# Patient Record
Sex: FEMALE | Race: WHITE | NOT HISPANIC OR LATINO | Employment: UNEMPLOYED | URBAN - METROPOLITAN AREA
[De-identification: names, ages, dates, MRNs, and addresses within clinical notes are randomized per-mention and may not be internally consistent; named-entity substitution may affect disease eponyms.]

---

## 2017-01-27 ENCOUNTER — APPOINTMENT (OUTPATIENT)
Dept: GENERAL RADIOLOGY | Facility: HOSPITAL | Age: 27
End: 2017-01-27

## 2017-01-27 ENCOUNTER — HOSPITAL ENCOUNTER (EMERGENCY)
Facility: HOSPITAL | Age: 27
Discharge: HOME | End: 2017-01-27
Attending: EMERGENCY MEDICINE | Admitting: EMERGENCY MEDICINE

## 2017-01-27 VITALS
HEART RATE: 94 BPM | RESPIRATION RATE: 16 BRPM | DIASTOLIC BLOOD PRESSURE: 58 MMHG | HEIGHT: 67 IN | WEIGHT: 150 LBS | BODY MASS INDEX: 23.54 KG/M2 | TEMPERATURE: 97.6 F | SYSTOLIC BLOOD PRESSURE: 99 MMHG | OXYGEN SATURATION: 96 %

## 2017-01-27 DIAGNOSIS — R19.7 DIARRHEA, UNSPECIFIED TYPE: ICD-10-CM

## 2017-01-27 DIAGNOSIS — R11.2 NON-INTRACTABLE VOMITING WITH NAUSEA, UNSPECIFIED VOMITING TYPE: ICD-10-CM

## 2017-01-27 DIAGNOSIS — J18.9 PNEUMONIA OF LEFT LOWER LOBE DUE TO INFECTIOUS ORGANISM: Primary | ICD-10-CM

## 2017-01-27 LAB
ALBUMIN SERPL-MCNC: 3.7 G/DL (ref 3.5–5.2)
ALBUMIN/GLOB SERPL: 1.1 G/DL
ALP SERPL-CCNC: 72 U/L (ref 39–117)
ALT SERPL W P-5'-P-CCNC: 12 U/L (ref 1–33)
ANION GAP SERPL CALCULATED.3IONS-SCNC: 13.5 MMOL/L
AST SERPL-CCNC: 11 U/L (ref 1–32)
B-HCG UR QL: NEGATIVE
BACTERIA UR QL AUTO: ABNORMAL /HPF
BASOPHILS # BLD AUTO: 0.01 10*3/MM3 (ref 0–0.2)
BASOPHILS NFR BLD AUTO: 0.1 % (ref 0–1.5)
BILIRUB SERPL-MCNC: 0.3 MG/DL (ref 0.1–1.2)
BILIRUB UR QL STRIP: NEGATIVE
BUN BLD-MCNC: 7 MG/DL (ref 6–20)
BUN/CREAT SERPL: 13 (ref 7–25)
CALCIUM SPEC-SCNC: 9.3 MG/DL (ref 8.6–10.5)
CHLORIDE SERPL-SCNC: 98 MMOL/L (ref 98–107)
CLARITY UR: ABNORMAL
CO2 SERPL-SCNC: 25.5 MMOL/L (ref 22–29)
COLOR UR: ABNORMAL
CREAT BLD-MCNC: 0.54 MG/DL (ref 0.57–1)
DEPRECATED RDW RBC AUTO: 41.4 FL (ref 37–54)
EOSINOPHIL # BLD AUTO: 0.04 10*3/MM3 (ref 0–0.7)
EOSINOPHIL NFR BLD AUTO: 0.5 % (ref 0.3–6.2)
ERYTHROCYTE [DISTWIDTH] IN BLOOD BY AUTOMATED COUNT: 12.7 % (ref 11.7–13)
FLUAV AG NPH QL: NEGATIVE
FLUBV AG NPH QL IA: NEGATIVE
GFR SERPL CREATININE-BSD FRML MDRD: 136 ML/MIN/1.73
GLOBULIN UR ELPH-MCNC: 3.4 GM/DL
GLUCOSE BLD-MCNC: 104 MG/DL (ref 65–99)
GLUCOSE UR STRIP-MCNC: NEGATIVE MG/DL
HCT VFR BLD AUTO: 38.6 % (ref 35.6–45.5)
HGB BLD-MCNC: 13 G/DL (ref 11.9–15.5)
HGB UR QL STRIP.AUTO: ABNORMAL
HOLD SPECIMEN: NORMAL
HOLD SPECIMEN: NORMAL
HYALINE CASTS UR QL AUTO: ABNORMAL /LPF
IMM GRANULOCYTES # BLD: 0.02 10*3/MM3 (ref 0–0.03)
IMM GRANULOCYTES NFR BLD: 0.2 % (ref 0–0.5)
KETONES UR QL STRIP: ABNORMAL
LEUKOCYTE ESTERASE UR QL STRIP.AUTO: ABNORMAL
LIPASE SERPL-CCNC: 14 U/L (ref 13–60)
LYMPHOCYTES # BLD AUTO: 2.33 10*3/MM3 (ref 0.9–4.8)
LYMPHOCYTES NFR BLD AUTO: 29.1 % (ref 19.6–45.3)
MCH RBC QN AUTO: 30 PG (ref 26.9–32)
MCHC RBC AUTO-ENTMCNC: 33.7 G/DL (ref 32.4–36.3)
MCV RBC AUTO: 88.9 FL (ref 80.5–98.2)
MONOCYTES # BLD AUTO: 0.76 10*3/MM3 (ref 0.2–1.2)
MONOCYTES NFR BLD AUTO: 9.5 % (ref 5–12)
NEUTROPHILS # BLD AUTO: 4.85 10*3/MM3 (ref 1.9–8.1)
NEUTROPHILS NFR BLD AUTO: 60.6 % (ref 42.7–76)
NITRITE UR QL STRIP: NEGATIVE
PH UR STRIP.AUTO: 6 [PH] (ref 5–8)
PLATELET # BLD AUTO: 241 10*3/MM3 (ref 140–500)
PMV BLD AUTO: 10.2 FL (ref 6–12)
POTASSIUM BLD-SCNC: 4.2 MMOL/L (ref 3.5–5.2)
PROT SERPL-MCNC: 7.1 G/DL (ref 6–8.5)
PROT UR QL STRIP: ABNORMAL
RBC # BLD AUTO: 4.34 10*6/MM3 (ref 3.9–5.2)
RBC # UR: ABNORMAL /HPF
REF LAB TEST METHOD: ABNORMAL
SODIUM BLD-SCNC: 137 MMOL/L (ref 136–145)
SP GR UR STRIP: 1.02 (ref 1–1.03)
SQUAMOUS #/AREA URNS HPF: ABNORMAL /HPF
UROBILINOGEN UR QL STRIP: ABNORMAL
WBC NRBC COR # BLD: 8.01 10*3/MM3 (ref 4.5–10.7)
WBC UR QL AUTO: ABNORMAL /HPF
WHOLE BLOOD HOLD SPECIMEN: NORMAL
WHOLE BLOOD HOLD SPECIMEN: NORMAL

## 2017-01-27 PROCEDURE — 87086 URINE CULTURE/COLONY COUNT: CPT | Performed by: EMERGENCY MEDICINE

## 2017-01-27 PROCEDURE — 99283 EMERGENCY DEPT VISIT LOW MDM: CPT

## 2017-01-27 PROCEDURE — 85025 COMPLETE CBC W/AUTO DIFF WBC: CPT | Performed by: EMERGENCY MEDICINE

## 2017-01-27 PROCEDURE — 25010000003 CEFTRIAXONE PER 250 MG: Performed by: EMERGENCY MEDICINE

## 2017-01-27 PROCEDURE — 96365 THER/PROPH/DIAG IV INF INIT: CPT

## 2017-01-27 PROCEDURE — 83690 ASSAY OF LIPASE: CPT | Performed by: EMERGENCY MEDICINE

## 2017-01-27 PROCEDURE — 80053 COMPREHEN METABOLIC PANEL: CPT | Performed by: EMERGENCY MEDICINE

## 2017-01-27 PROCEDURE — 96361 HYDRATE IV INFUSION ADD-ON: CPT

## 2017-01-27 PROCEDURE — 25010000002 ONDANSETRON PER 1 MG: Performed by: EMERGENCY MEDICINE

## 2017-01-27 PROCEDURE — 81025 URINE PREGNANCY TEST: CPT | Performed by: EMERGENCY MEDICINE

## 2017-01-27 PROCEDURE — 87804 INFLUENZA ASSAY W/OPTIC: CPT | Performed by: EMERGENCY MEDICINE

## 2017-01-27 PROCEDURE — 96375 TX/PRO/DX INJ NEW DRUG ADDON: CPT

## 2017-01-27 PROCEDURE — 71020 HC CHEST PA AND LATERAL: CPT

## 2017-01-27 PROCEDURE — 81001 URINALYSIS AUTO W/SCOPE: CPT | Performed by: EMERGENCY MEDICINE

## 2017-01-27 RX ORDER — SODIUM CHLORIDE 0.9 % (FLUSH) 0.9 %
10 SYRINGE (ML) INJECTION AS NEEDED
Status: DISCONTINUED | OUTPATIENT
Start: 2017-01-27 | End: 2017-01-27 | Stop reason: HOSPADM

## 2017-01-27 RX ORDER — CEFTRIAXONE SODIUM 1 G/50ML
1 INJECTION, SOLUTION INTRAVENOUS ONCE
Status: COMPLETED | OUTPATIENT
Start: 2017-01-27 | End: 2017-01-27

## 2017-01-27 RX ORDER — ONDANSETRON 2 MG/ML
4 INJECTION INTRAMUSCULAR; INTRAVENOUS ONCE
Status: COMPLETED | OUTPATIENT
Start: 2017-01-27 | End: 2017-01-27

## 2017-01-27 RX ORDER — DOXYCYCLINE 100 MG/1
100 CAPSULE ORAL 2 TIMES DAILY
Qty: 14 CAPSULE | Refills: 0 | OUTPATIENT
Start: 2017-01-27 | End: 2020-11-01

## 2017-01-27 RX ORDER — ONDANSETRON 4 MG/1
4 TABLET, ORALLY DISINTEGRATING ORAL EVERY 8 HOURS PRN
Qty: 15 TABLET | Refills: 0 | OUTPATIENT
Start: 2017-01-27 | End: 2020-11-01

## 2017-01-27 RX ADMIN — ONDANSETRON 4 MG: 2 INJECTION INTRAMUSCULAR; INTRAVENOUS at 05:45

## 2017-01-27 RX ADMIN — CEFTRIAXONE SODIUM 1 G: 1 INJECTION, SOLUTION INTRAVENOUS at 07:43

## 2017-01-27 RX ADMIN — SODIUM CHLORIDE 1000 ML: 9 INJECTION, SOLUTION INTRAVENOUS at 05:45

## 2017-01-28 LAB — BACTERIA SPEC AEROBE CULT: NORMAL

## 2020-11-01 ENCOUNTER — APPOINTMENT (OUTPATIENT)
Dept: CT IMAGING | Facility: HOSPITAL | Age: 30
End: 2020-11-01

## 2020-11-01 ENCOUNTER — HOSPITAL ENCOUNTER (EMERGENCY)
Facility: HOSPITAL | Age: 30
Discharge: HOME | End: 2020-11-01
Admitting: EMERGENCY MEDICINE

## 2020-11-01 VITALS
SYSTOLIC BLOOD PRESSURE: 121 MMHG | WEIGHT: 241.62 LBS | DIASTOLIC BLOOD PRESSURE: 72 MMHG | RESPIRATION RATE: 18 BRPM | HEIGHT: 68 IN | TEMPERATURE: 98.2 F | OXYGEN SATURATION: 99 % | HEART RATE: 70 BPM | BODY MASS INDEX: 36.62 KG/M2

## 2020-11-01 DIAGNOSIS — R10.9 FLANK PAIN: Primary | ICD-10-CM

## 2020-11-01 DIAGNOSIS — R11.0 NAUSEA: ICD-10-CM

## 2020-11-01 LAB
ANION GAP SERPL CALCULATED.3IONS-SCNC: 6 MMOL/L (ref 5–15)
B-HCG UR QL: NEGATIVE
BASOPHILS # BLD AUTO: 0 10*3/MM3 (ref 0–0.2)
BASOPHILS NFR BLD AUTO: 0.3 % (ref 0–1.5)
BILIRUB UR QL STRIP: NEGATIVE
BUN SERPL-MCNC: 10 MG/DL (ref 6–20)
BUN/CREAT SERPL: 18.2 (ref 7–25)
CALCIUM SPEC-SCNC: 9.2 MG/DL (ref 8.6–10.5)
CHLORIDE SERPL-SCNC: 102 MMOL/L (ref 98–107)
CLARITY UR: ABNORMAL
CO2 SERPL-SCNC: 29 MMOL/L (ref 22–29)
COLOR UR: YELLOW
CREAT SERPL-MCNC: 0.55 MG/DL (ref 0.57–1)
DEPRECATED RDW RBC AUTO: 37.6 FL (ref 37–54)
EOSINOPHIL # BLD AUTO: 0.1 10*3/MM3 (ref 0–0.4)
EOSINOPHIL NFR BLD AUTO: 1 % (ref 0.3–6.2)
ERYTHROCYTE [DISTWIDTH] IN BLOOD BY AUTOMATED COUNT: 12.5 % (ref 12.3–15.4)
GFR SERPL CREATININE-BSD FRML MDRD: 130 ML/MIN/1.73
GLUCOSE SERPL-MCNC: 90 MG/DL (ref 65–99)
GLUCOSE UR STRIP-MCNC: NEGATIVE MG/DL
HCT VFR BLD AUTO: 39.4 % (ref 34–46.6)
HGB BLD-MCNC: 13.6 G/DL (ref 12–15.9)
HGB UR QL STRIP.AUTO: NEGATIVE
KETONES UR QL STRIP: NEGATIVE
LEUKOCYTE ESTERASE UR QL STRIP.AUTO: NEGATIVE
LYMPHOCYTES # BLD AUTO: 2.1 10*3/MM3 (ref 0.7–3.1)
LYMPHOCYTES NFR BLD AUTO: 37.7 % (ref 19.6–45.3)
MCH RBC QN AUTO: 29.6 PG (ref 26.6–33)
MCHC RBC AUTO-ENTMCNC: 34.4 G/DL (ref 31.5–35.7)
MCV RBC AUTO: 86.1 FL (ref 79–97)
MONOCYTES # BLD AUTO: 0.5 10*3/MM3 (ref 0.1–0.9)
MONOCYTES NFR BLD AUTO: 9.7 % (ref 5–12)
NEUTROPHILS NFR BLD AUTO: 2.8 10*3/MM3 (ref 1.7–7)
NEUTROPHILS NFR BLD AUTO: 51.3 % (ref 42.7–76)
NITRITE UR QL STRIP: NEGATIVE
NRBC BLD AUTO-RTO: 0 /100 WBC (ref 0–0.2)
PH UR STRIP.AUTO: 6.5 [PH] (ref 5–8)
PLATELET # BLD AUTO: 206 10*3/MM3 (ref 140–450)
PMV BLD AUTO: 8.4 FL (ref 6–12)
POTASSIUM SERPL-SCNC: 4.2 MMOL/L (ref 3.5–5.2)
PROT UR QL STRIP: NEGATIVE
RBC # BLD AUTO: 4.58 10*6/MM3 (ref 3.77–5.28)
SODIUM SERPL-SCNC: 137 MMOL/L (ref 136–145)
SP GR UR STRIP: 1.03 (ref 1–1.03)
UROBILINOGEN UR QL STRIP: ABNORMAL
WBC # BLD AUTO: 5.5 10*3/MM3 (ref 3.4–10.8)

## 2020-11-01 PROCEDURE — 96375 TX/PRO/DX INJ NEW DRUG ADDON: CPT

## 2020-11-01 PROCEDURE — 96374 THER/PROPH/DIAG INJ IV PUSH: CPT

## 2020-11-01 PROCEDURE — 25010000002 ONDANSETRON PER 1 MG: Performed by: NURSE PRACTITIONER

## 2020-11-01 PROCEDURE — 25010000002 KETOROLAC TROMETHAMINE PER 15 MG: Performed by: NURSE PRACTITIONER

## 2020-11-01 PROCEDURE — 81003 URINALYSIS AUTO W/O SCOPE: CPT | Performed by: NURSE PRACTITIONER

## 2020-11-01 PROCEDURE — 85025 COMPLETE CBC W/AUTO DIFF WBC: CPT | Performed by: NURSE PRACTITIONER

## 2020-11-01 PROCEDURE — 81025 URINE PREGNANCY TEST: CPT | Performed by: NURSE PRACTITIONER

## 2020-11-01 PROCEDURE — 99283 EMERGENCY DEPT VISIT LOW MDM: CPT

## 2020-11-01 PROCEDURE — 74176 CT ABD & PELVIS W/O CONTRAST: CPT

## 2020-11-01 PROCEDURE — 80048 BASIC METABOLIC PNL TOTAL CA: CPT | Performed by: NURSE PRACTITIONER

## 2020-11-01 RX ORDER — ONDANSETRON 2 MG/ML
4 INJECTION INTRAMUSCULAR; INTRAVENOUS ONCE
Status: COMPLETED | OUTPATIENT
Start: 2020-11-01 | End: 2020-11-01

## 2020-11-01 RX ORDER — KETOROLAC TROMETHAMINE 15 MG/ML
15 INJECTION, SOLUTION INTRAMUSCULAR; INTRAVENOUS ONCE
Status: COMPLETED | OUTPATIENT
Start: 2020-11-01 | End: 2020-11-01

## 2020-11-01 RX ORDER — IBUPROFEN 800 MG/1
800 TABLET ORAL EVERY 8 HOURS PRN
Qty: 9 TABLET | Refills: 0 | Status: SHIPPED | OUTPATIENT
Start: 2020-11-01 | End: 2022-03-19

## 2020-11-01 RX ORDER — ONDANSETRON 4 MG/1
4 TABLET, ORALLY DISINTEGRATING ORAL EVERY 8 HOURS PRN
Qty: 20 TABLET | Refills: 0 | Status: SHIPPED | OUTPATIENT
Start: 2020-11-01 | End: 2022-03-19

## 2020-11-01 RX ADMIN — ONDANSETRON 4 MG: 2 INJECTION INTRAMUSCULAR; INTRAVENOUS at 16:34

## 2020-11-01 RX ADMIN — KETOROLAC TROMETHAMINE 15 MG: 15 INJECTION, SOLUTION INTRAMUSCULAR; INTRAVENOUS at 16:33

## 2020-11-01 NOTE — ED NOTES
Pt c/o right flank pain worsening x1 wk; states has known kidney stone.     Savita Gunn, RN  11/01/20 4567

## 2020-11-01 NOTE — ED PROVIDER NOTES
Subjective   Chief complaint: Right sided flank pain      Context: Patient is a 30-year-old female who comes in complaining of right-sided flank pain for the last week.  She states this feels similar to last time she had a kidney stone.  Has had a prior history of ureteral stent several years ago.  She denies any urinary complaints fever vomiting diarrhea melena hematochezia.  Has had some nausea.  She denies any unusual vaginal bleeding or discharge.  She is not previously been seen for these complaints this week.    Duration: 1 week    Timing: Waxes and wanes    Severity: Moderate    Associated symptoms: Worse with range of motion          PCP: None      LNMP: 2 days ago            Review of Systems    Past Medical History:   Diagnosis Date   • Asthma    • Kidney stone        Allergies   Allergen Reactions   • Penicillins        Past Surgical History:   Procedure Laterality Date   •  SECTION     • CHOLECYSTECTOMY     • DILATION AND CURETTAGE, DIAGNOSTIC / THERAPEUTIC     • URETERAL STENT INSERTION         No family history on file.    Social History     Socioeconomic History   • Marital status: Single     Spouse name: Not on file   • Number of children: Not on file   • Years of education: Not on file   • Highest education level: Not on file   Tobacco Use   • Smoking status: Current Every Day Smoker     Packs/day: 0.50   Substance and Sexual Activity   • Alcohol use: No           Objective   Physical Exam     Vital signs and triage nurse note reviewed.   Constitutional: Awake, alert; well-developed and well-nourished.  Nontoxic in appearance.  Uncomfortable  HEENT: Normocephalic, atraumatic; pupils are PERRL with intact EOM; oropharynx is pink and moist without exudate or erythema.   Neck: Supple, full range of motion without pain;    Cardiovascular: Regular rate and rhythm, normal S1-S2.   Pulmonary: Respiratory effort regular nonlabored, breath sounds clear to auscultation all fields.   Abdomen: Soft,  right CVAT nondistended with normoactive bowel sounds; no rebound or guarding. Negative Brothers McBurney.  No peritoneal rigidity or guarding.  There is no suprapubic tenderness  Musculoskeletal: Independent range of motion of all extremities with no palpable tenderness or edema.   Neuro: Alert oriented x3, speech is clear and appropriate, GCS 15   Skin:  Fleshtone warm, dry, intact; no erythematous or petechial rash or lesion       Procedures           ED Course  ED Course as of Nov 01 1712   Sun Nov 01, 2020   1336 Nursing staff reports difficulty in obtaining IV access/labs    [JW]      ED Course User Index  [JW] Shakila Spears APRN           Labs Reviewed   BASIC METABOLIC PANEL - Abnormal; Notable for the following components:       Result Value    Creatinine 0.55 (*)     All other components within normal limits    Narrative:     GFR Normal >60  Chronic Kidney Disease <60  Kidney Failure <15     URINALYSIS W/ CULTURE IF INDICATED - Abnormal; Notable for the following components:    Appearance, UA Cloudy (*)     All other components within normal limits    Narrative:     Urine microscopic not indicated.   CBC WITH AUTO DIFFERENTIAL - Normal   PREGNANCY, URINE - Normal   CBC AND DIFFERENTIAL    Narrative:     The following orders were created for panel order CBC & Differential.  Procedure                               Abnormality         Status                     ---------                               -----------         ------                     CBC Auto Differential[[id number]]        Normal              Final result                 Please view results for these tests on the individual orders.     Medications   ondansetron (ZOFRAN) injection 4 mg (4 mg Intravenous Given 11/1/20 1634)   ketorolac (TORADOL) injection 15 mg (15 mg Intravenous Given 11/1/20 1633)     Ct Abdomen Pelvis Stone Protocol    Result Date: 11/1/2020  Evidence of cholecystectomy, otherwise normal noncontrast CT of the abdomen and  pelvis. No renal or ureteral stones or obstructing uropathy.  Electronically Signed By-Addy Larry DO. On:11/1/2020 5:02 PM This report was finalized on [id number] by  Addy Larry DO..                                    MDM  Number of Diagnoses or Management Options  Flank pain:   Nausea:   Diagnosis management comments:       Comorbidities:  has a past medical history of Asthma and Kidney stone.  Differentials: Kidney stone musculoskeletal pyelonephritis UTI; torsion felt unlikely based on HPI and physical exam not all inclusive of differentials considered  Radiology interpretation:  X-rays reviewed by me and interpreted by radiologist,   Ct Abdomen Pelvis Stone Protocol    Result Date: 11/1/2020  Evidence of cholecystectomy, otherwise normal noncontrast CT of the abdomen and pelvis. No renal or ureteral stones or obstructing uropathy.  Electronically Signed By-Addy Larry DO. On:11/1/2020 5:02 PM This report was finalized on [id number] by  Addy Larry DO..    Lab interpretation:  Labs viewed by me significant for, negative    Appropriate PPE worn during exam.  Given dose of Toradol and Zofran while in the ER.    i discussed findings with patient who voices understanding of discharge instructions, signs and symptoms requiring return to ED; discharged improved and in stable condition with follow up for re-evaluation.  Patient be discharged home with ibuprofen and Zofran      Final diagnoses:   Flank pain   Nausea            Shakila Spears, APRN  11/01/20 1713

## 2020-11-01 NOTE — DISCHARGE INSTRUCTIONS
Clear liquids for the next 24 hours.  Gradually increase your diet as tolerated.  No milk products for 48 hours.  If more than 8-10 episodes of diarrhea per day use over-the-counter Imodium.  Follow up with your family doctor next week.  Return for any new or worsening symptoms

## 2020-11-01 NOTE — ED NOTES
Patient marked ready for CT per Shakila ANDREA's request     Emilie Herrera, RegSched Rep  11/01/20 1524

## 2022-03-19 ENCOUNTER — HOSPITAL ENCOUNTER (EMERGENCY)
Facility: HOSPITAL | Age: 32
Discharge: HOME | End: 2022-03-19
Attending: EMERGENCY MEDICINE | Admitting: EMERGENCY MEDICINE

## 2022-03-19 VITALS
RESPIRATION RATE: 16 BRPM | HEIGHT: 67 IN | DIASTOLIC BLOOD PRESSURE: 74 MMHG | BODY MASS INDEX: 39.17 KG/M2 | HEART RATE: 69 BPM | WEIGHT: 249.56 LBS | TEMPERATURE: 97.8 F | OXYGEN SATURATION: 98 % | SYSTOLIC BLOOD PRESSURE: 135 MMHG

## 2022-03-19 DIAGNOSIS — K05.10 GINGIVITIS, CHRONIC: ICD-10-CM

## 2022-03-19 DIAGNOSIS — K02.9 DENTAL CARIES: Primary | ICD-10-CM

## 2022-03-19 DIAGNOSIS — R68.84 PAIN IN LOWER JAW: ICD-10-CM

## 2022-03-19 PROCEDURE — 99283 EMERGENCY DEPT VISIT LOW MDM: CPT

## 2022-03-19 PROCEDURE — 63710000001 ONDANSETRON ODT 4 MG TABLET DISPERSIBLE: Performed by: NURSE PRACTITIONER

## 2022-03-19 RX ORDER — ONDANSETRON 4 MG/1
4 TABLET, ORALLY DISINTEGRATING ORAL ONCE
Status: COMPLETED | OUTPATIENT
Start: 2022-03-19 | End: 2022-03-19

## 2022-03-19 RX ORDER — CLINDAMYCIN HYDROCHLORIDE 300 MG/1
300 CAPSULE ORAL ONCE
Status: COMPLETED | OUTPATIENT
Start: 2022-03-19 | End: 2022-03-19

## 2022-03-19 RX ORDER — DICLOFENAC SODIUM 75 MG/1
75 TABLET, DELAYED RELEASE ORAL 2 TIMES DAILY PRN
Qty: 20 TABLET | Refills: 0 | OUTPATIENT
Start: 2022-03-19 | End: 2022-09-01

## 2022-03-19 RX ORDER — CLINDAMYCIN HYDROCHLORIDE 300 MG/1
300 CAPSULE ORAL 3 TIMES DAILY
Qty: 30 CAPSULE | Refills: 0 | Status: SHIPPED | OUTPATIENT
Start: 2022-03-19 | End: 2022-03-29

## 2022-03-19 RX ORDER — HYDROCODONE BITARTRATE AND ACETAMINOPHEN 5; 325 MG/1; MG/1
1 TABLET ORAL ONCE
Status: COMPLETED | OUTPATIENT
Start: 2022-03-19 | End: 2022-03-19

## 2022-03-19 RX ORDER — ONDANSETRON 4 MG/1
4 TABLET, ORALLY DISINTEGRATING ORAL 4 TIMES DAILY PRN
Qty: 10 TABLET | Refills: 0 | OUTPATIENT
Start: 2022-03-19 | End: 2022-09-01

## 2022-03-19 RX ADMIN — HYDROCODONE BITARTRATE AND ACETAMINOPHEN 1 TABLET: 5; 325 TABLET ORAL at 19:28

## 2022-03-19 RX ADMIN — CLINDAMYCIN HYDROCHLORIDE 300 MG: 300 CAPSULE ORAL at 19:37

## 2022-03-19 RX ADMIN — LIDOCAINE HYDROCHLORIDE: 20 SOLUTION ORAL; TOPICAL at 19:27

## 2022-03-19 RX ADMIN — ONDANSETRON 4 MG: 4 TABLET, ORALLY DISINTEGRATING ORAL at 19:56

## 2022-03-19 NOTE — DISCHARGE INSTRUCTIONS
Take antibiotics till gone.    Use Voltaren for pain do not mix with Motrin ibuprofen Advil or Aleve.    See a dentist as soon as possible    Return if worse

## 2022-03-19 NOTE — ED PROVIDER NOTES
Subjective   Patient is a morbidly obese 31-year-old female with poor dental hygiene who comes in with lower jaw pain swelling for the last 2 days.  She states that she has not seen a dentist in many years she knows that she has bad cavities and gingivitis that is out of control she thinks that she needs her last 4 bottom teeth removed.  She rates her pain an 8/10 she states it is severe it radiates into her chin she denies any difficulty breathing or swallowing.          Review of Systems   Constitutional: Negative for chills, fatigue and fever.   HENT: Positive for dental problem and facial swelling. Negative for congestion, drooling, rhinorrhea, tinnitus and trouble swallowing.    Eyes: Negative for photophobia, discharge and redness.   Respiratory: Negative for cough and shortness of breath.    Cardiovascular: Negative for chest pain and palpitations.   Gastrointestinal: Negative for abdominal pain, diarrhea, nausea and vomiting.   Genitourinary: Negative for dysuria, frequency and urgency.   Musculoskeletal: Negative for back pain, joint swelling and myalgias.   Skin: Negative for rash.   Neurological: Negative for dizziness and headaches.   Psychiatric/Behavioral: Negative for confusion.   All other systems reviewed and are negative.      Past Medical History:   Diagnosis Date   • Asthma    • Kidney stone        Allergies   Allergen Reactions   • Penicillins        Past Surgical History:   Procedure Laterality Date   •  SECTION     • CHOLECYSTECTOMY     • DILATION AND CURETTAGE, DIAGNOSTIC / THERAPEUTIC     • URETERAL STENT INSERTION         No family history on file.    Social History     Socioeconomic History   • Marital status: Single   Tobacco Use   • Smoking status: Current Every Day Smoker     Packs/day: 0.50   Substance and Sexual Activity   • Alcohol use: No           Objective   Physical Exam  Vitals reviewed.   Constitutional:       Appearance: Normal appearance. She is well-developed.  "  HENT:      Head: Normocephalic and atraumatic.      Right Ear: External ear normal.      Left Ear: External ear normal.      Mouth/Throat:      Lips: Pink.      Mouth: Mucous membranes are moist. No angioedema.      Dentition: Abnormal dentition. Dental tenderness, gingival swelling, dental caries, dental abscesses and gum lesions present.      Palate: No mass and lesions.      Pharynx: Oropharynx is clear. Uvula midline.      Tonsils: No tonsillar exudate or tonsillar abscesses.   Eyes:      Conjunctiva/sclera: Conjunctivae normal.      Pupils: Pupils are equal, round, and reactive to light.   Cardiovascular:      Rate and Rhythm: Normal rate and regular rhythm.      Heart sounds: Normal heart sounds.   Musculoskeletal:         General: No deformity. Normal range of motion.      Cervical back: Normal range of motion and neck supple.   Skin:     General: Skin is warm and dry.      Capillary Refill: Capillary refill takes less than 2 seconds.   Neurological:      Mental Status: She is alert and oriented to person, place, and time.      GCS: GCS eye subscore is 4. GCS verbal subscore is 5. GCS motor subscore is 6.      Cranial Nerves: No cranial nerve deficit.      Sensory: No sensory deficit.      Deep Tendon Reflexes: Reflexes normal.   Psychiatric:         Mood and Affect: Mood normal.         Behavior: Behavior normal.         Procedures           ED Course      /74   Pulse 69   Temp 97.8 °F (36.6 °C) (Oral)   Resp 16   Ht 170.2 cm (67\")   Wt 113 kg (249 lb 9 oz)   LMP 03/02/2022   SpO2 98%   BMI 39.09 kg/m²   Labs Reviewed - No data to display  Medications   clindamycin (CLEOCIN) capsule 300 mg (300 mg Oral Given 3/19/22 1937)   dental ball oral suspension with diphenhydrAMINE ( Swish & Spit Given 3/19/22 1927)   HYDROcodone-acetaminophen (NORCO) 5-325 MG per tablet 1 tablet (1 tablet Oral Given 3/19/22 1928)   ondansetron ODT (ZOFRAN-ODT) disintegrating tablet 4 mg (4 mg Oral Given 3/19/22 1956) "     No radiology results for the last day                                             MDM  Number of Diagnoses or Management Options  Dental caries  Gingivitis, chronic  Pain in lower jaw  Diagnosis management comments: Above exam was performed and patient was found to have severe gingivitis and dental caries dental abscess in the bottom 4 front teeth.  The patient will be started on clindamycin she will be given a pain pill here she be sent home with some Voltaren some Zofran and some clindamycin she was given the dental information list as well as a good Rx coupon to help with her prescriptions she was advised to see a dentist as soon as possible and to return if worse she verbalized understood discharge instruction.    Risk of Complications, Morbidity, and/or Mortality  Presenting problems: minimal  Diagnostic procedures: minimal  Management options: minimal    Patient Progress  Patient progress: improved      Final diagnoses:   Dental caries   Pain in lower jaw   Gingivitis, chronic       ED Disposition  ED Disposition     ED Disposition   Discharge    Condition   Stable    Comment   --               See a dentist as soon as possible             Medication List      New Prescriptions    clindamycin 300 MG capsule  Commonly known as: CLEOCIN  Take 1 capsule by mouth 3 (Three) Times a Day for 10 days.     diclofenac 75 MG EC tablet  Commonly known as: VOLTAREN  Take 1 tablet by mouth 2 (Two) Times a Day As Needed (pain).     ondansetron ODT 4 MG disintegrating tablet  Commonly known as: ZOFRAN-ODT  Place 1 tablet under the tongue 4 (Four) Times a Day As Needed for Nausea or Vomiting.           Where to Get Your Medications      These medications were sent to [facility], [place] - [address] AT [facility] - [phone]  - [phone] FX  [address] [place] [address]    Phone: [phone]   · clindamycin 300 MG capsule  · diclofenac 75 MG EC tablet  · ondansetron ODT 4  MG disintegrating tablet          Janee Spears, APRN  03/19/22 1959

## 2022-03-19 NOTE — ED NOTES
Patient states she has severe heartburn and is exteremly nauseous and spitting up. Informed provider and new orders were put in.  Patient states she didn't throw up the pills.

## 2022-08-12 ENCOUNTER — HOSPITAL ENCOUNTER (EMERGENCY)
Facility: HOSPITAL | Age: 32
End: 2022-08-12

## 2022-08-13 ENCOUNTER — HOSPITAL ENCOUNTER (EMERGENCY)
Facility: HOSPITAL | Age: 32
Discharge: LEFT BEFORE DISCHARGE | End: 2022-08-13
Attending: EMERGENCY MEDICINE

## 2022-08-13 VITALS
BODY MASS INDEX: 47.63 KG/M2 | HEIGHT: 62 IN | SYSTOLIC BLOOD PRESSURE: 117 MMHG | HEART RATE: 75 BPM | TEMPERATURE: 97.3 F | OXYGEN SATURATION: 98 % | DIASTOLIC BLOOD PRESSURE: 58 MMHG | WEIGHT: 258.82 LBS | RESPIRATION RATE: 16 BRPM

## 2022-08-13 LAB
BACTERIA UR QL AUTO: ABNORMAL /HPF
BILIRUB UR QL STRIP: NEGATIVE
CLARITY UR: CLEAR
COLOR UR: ABNORMAL
GLUCOSE UR STRIP-MCNC: NEGATIVE MG/DL
HGB UR QL STRIP.AUTO: ABNORMAL
HYALINE CASTS UR QL AUTO: ABNORMAL /LPF
KETONES UR QL STRIP: ABNORMAL
LEUKOCYTE ESTERASE UR QL STRIP.AUTO: ABNORMAL
NITRITE UR QL STRIP: NEGATIVE
PH UR STRIP.AUTO: 5.5 [PH] (ref 5–8)
PROT UR QL STRIP: NEGATIVE
RBC # UR STRIP: ABNORMAL /HPF
REF LAB TEST METHOD: ABNORMAL
SP GR UR STRIP: 1.04 (ref 1–1.03)
SQUAMOUS #/AREA URNS HPF: ABNORMAL /HPF
UROBILINOGEN UR QL STRIP: ABNORMAL
WBC # UR STRIP: ABNORMAL /HPF

## 2022-08-13 PROCEDURE — 81001 URINALYSIS AUTO W/SCOPE: CPT

## 2022-08-13 PROCEDURE — 99211 OFF/OP EST MAY X REQ PHY/QHP: CPT | Performed by: EMERGENCY MEDICINE

## 2022-08-14 ENCOUNTER — APPOINTMENT (OUTPATIENT)
Dept: ULTRASOUND IMAGING | Facility: HOSPITAL | Age: 32
End: 2022-08-14

## 2022-08-14 ENCOUNTER — HOSPITAL ENCOUNTER (EMERGENCY)
Facility: HOSPITAL | Age: 32
Discharge: HOME | End: 2022-08-14
Attending: EMERGENCY MEDICINE | Admitting: EMERGENCY MEDICINE

## 2022-08-14 VITALS
WEIGHT: 259.26 LBS | SYSTOLIC BLOOD PRESSURE: 121 MMHG | TEMPERATURE: 98 F | DIASTOLIC BLOOD PRESSURE: 69 MMHG | HEIGHT: 67 IN | RESPIRATION RATE: 16 BRPM | HEART RATE: 75 BPM | OXYGEN SATURATION: 97 % | BODY MASS INDEX: 40.69 KG/M2

## 2022-08-14 DIAGNOSIS — O20.9 VAGINAL BLEEDING IN PREGNANCY, FIRST TRIMESTER: Primary | ICD-10-CM

## 2022-08-14 DIAGNOSIS — K04.7 DENTAL INFECTION: ICD-10-CM

## 2022-08-14 LAB
ABO GROUP BLD: NORMAL
ALBUMIN SERPL-MCNC: 3.5 G/DL (ref 3.5–5.2)
ALBUMIN/GLOB SERPL: 1.1 G/DL
ALP SERPL-CCNC: 86 U/L (ref 39–117)
ALT SERPL W P-5'-P-CCNC: 23 U/L (ref 1–33)
ANION GAP SERPL CALCULATED.3IONS-SCNC: 9 MMOL/L (ref 5–15)
AST SERPL-CCNC: 22 U/L (ref 1–32)
BACTERIA UR QL AUTO: ABNORMAL /HPF
BASOPHILS # BLD AUTO: 0.1 10*3/MM3 (ref 0–0.2)
BASOPHILS NFR BLD AUTO: 0.7 % (ref 0–1.5)
BILIRUB SERPL-MCNC: 0.2 MG/DL (ref 0–1.2)
BILIRUB UR QL STRIP: NEGATIVE
BLD GP AB SCN SERPL QL: NEGATIVE
BUN SERPL-MCNC: 8 MG/DL (ref 6–20)
BUN/CREAT SERPL: 14.3 (ref 7–25)
CALCIUM SPEC-SCNC: 9.2 MG/DL (ref 8.6–10.5)
CHLORIDE SERPL-SCNC: 102 MMOL/L (ref 98–107)
CLARITY UR: CLEAR
CO2 SERPL-SCNC: 23 MMOL/L (ref 22–29)
COLOR UR: YELLOW
CREAT SERPL-MCNC: 0.56 MG/DL (ref 0.57–1)
DEPRECATED RDW RBC AUTO: 42.9 FL (ref 37–54)
EGFRCR SERPLBLD CKD-EPI 2021: 125.3 ML/MIN/1.73
EOSINOPHIL # BLD AUTO: 0.1 10*3/MM3 (ref 0–0.4)
EOSINOPHIL NFR BLD AUTO: 0.8 % (ref 0.3–6.2)
ERYTHROCYTE [DISTWIDTH] IN BLOOD BY AUTOMATED COUNT: 14.1 % (ref 12.3–15.4)
GLOBULIN UR ELPH-MCNC: 3.3 GM/DL
GLUCOSE SERPL-MCNC: 89 MG/DL (ref 65–99)
GLUCOSE UR STRIP-MCNC: NEGATIVE MG/DL
HCG INTACT+B SERPL-ACNC: NORMAL MIU/ML
HCT VFR BLD AUTO: 36.4 % (ref 34–46.6)
HGB BLD-MCNC: 12.1 G/DL (ref 12–15.9)
HGB UR QL STRIP.AUTO: NEGATIVE
HYALINE CASTS UR QL AUTO: ABNORMAL /LPF
KETONES UR QL STRIP: NEGATIVE
LEUKOCYTE ESTERASE UR QL STRIP.AUTO: ABNORMAL
LYMPHOCYTES # BLD AUTO: 2.3 10*3/MM3 (ref 0.7–3.1)
LYMPHOCYTES NFR BLD AUTO: 32.3 % (ref 19.6–45.3)
MCH RBC QN AUTO: 28.9 PG (ref 26.6–33)
MCHC RBC AUTO-ENTMCNC: 33.3 G/DL (ref 31.5–35.7)
MCV RBC AUTO: 86.8 FL (ref 79–97)
MONOCYTES # BLD AUTO: 0.7 10*3/MM3 (ref 0.1–0.9)
MONOCYTES NFR BLD AUTO: 9.2 % (ref 5–12)
NEUTROPHILS NFR BLD AUTO: 4.1 10*3/MM3 (ref 1.7–7)
NEUTROPHILS NFR BLD AUTO: 57 % (ref 42.7–76)
NITRITE UR QL STRIP: NEGATIVE
NRBC BLD AUTO-RTO: 0.1 /100 WBC (ref 0–0.2)
PH UR STRIP.AUTO: 6.5 [PH] (ref 5–8)
PLATELET # BLD AUTO: 217 10*3/MM3 (ref 140–450)
PMV BLD AUTO: 8.1 FL (ref 6–12)
POTASSIUM SERPL-SCNC: 4.8 MMOL/L (ref 3.5–5.2)
PROT SERPL-MCNC: 6.8 G/DL (ref 6–8.5)
PROT UR QL STRIP: NEGATIVE
RBC # BLD AUTO: 4.2 10*6/MM3 (ref 3.77–5.28)
RBC # UR STRIP: ABNORMAL /HPF
REF LAB TEST METHOD: ABNORMAL
RH BLD: POSITIVE
SODIUM SERPL-SCNC: 134 MMOL/L (ref 136–145)
SP GR UR STRIP: 1.02 (ref 1–1.03)
SQUAMOUS #/AREA URNS HPF: ABNORMAL /HPF
T&S EXPIRATION DATE: NORMAL
UROBILINOGEN UR QL STRIP: ABNORMAL
WBC # UR STRIP: ABNORMAL /HPF
WBC NRBC COR # BLD: 7.2 10*3/MM3 (ref 3.4–10.8)

## 2022-08-14 PROCEDURE — 93976 VASCULAR STUDY: CPT

## 2022-08-14 PROCEDURE — 85025 COMPLETE CBC W/AUTO DIFF WBC: CPT | Performed by: EMERGENCY MEDICINE

## 2022-08-14 PROCEDURE — 86901 BLOOD TYPING SEROLOGIC RH(D): CPT | Performed by: EMERGENCY MEDICINE

## 2022-08-14 PROCEDURE — 86901 BLOOD TYPING SEROLOGIC RH(D): CPT

## 2022-08-14 PROCEDURE — 86900 BLOOD TYPING SEROLOGIC ABO: CPT

## 2022-08-14 PROCEDURE — 84702 CHORIONIC GONADOTROPIN TEST: CPT | Performed by: EMERGENCY MEDICINE

## 2022-08-14 PROCEDURE — 76801 OB US < 14 WKS SINGLE FETUS: CPT

## 2022-08-14 PROCEDURE — 76817 TRANSVAGINAL US OBSTETRIC: CPT

## 2022-08-14 PROCEDURE — 36415 COLL VENOUS BLD VENIPUNCTURE: CPT | Performed by: EMERGENCY MEDICINE

## 2022-08-14 PROCEDURE — 99283 EMERGENCY DEPT VISIT LOW MDM: CPT

## 2022-08-14 PROCEDURE — 86850 RBC ANTIBODY SCREEN: CPT | Performed by: EMERGENCY MEDICINE

## 2022-08-14 PROCEDURE — 86900 BLOOD TYPING SEROLOGIC ABO: CPT | Performed by: EMERGENCY MEDICINE

## 2022-08-14 PROCEDURE — 81001 URINALYSIS AUTO W/SCOPE: CPT | Performed by: EMERGENCY MEDICINE

## 2022-08-14 PROCEDURE — 80053 COMPREHEN METABOLIC PANEL: CPT | Performed by: EMERGENCY MEDICINE

## 2022-08-14 RX ORDER — SODIUM CHLORIDE 0.9 % (FLUSH) 0.9 %
10 SYRINGE (ML) INJECTION AS NEEDED
Status: DISCONTINUED | OUTPATIENT
Start: 2022-08-14 | End: 2022-08-14 | Stop reason: HOSPADM

## 2022-08-14 RX ORDER — CLINDAMYCIN HYDROCHLORIDE 150 MG/1
450 CAPSULE ORAL 3 TIMES DAILY
Qty: 63 CAPSULE | Refills: 0 | Status: SHIPPED | OUTPATIENT
Start: 2022-08-14 | End: 2022-08-14 | Stop reason: SDUPTHER

## 2022-08-14 RX ORDER — LIDOCAINE HYDROCHLORIDE 20 MG/ML
5 INJECTION, SOLUTION INFILTRATION; PERINEURAL ONCE
Status: COMPLETED | OUTPATIENT
Start: 2022-08-14 | End: 2022-08-14

## 2022-08-14 RX ORDER — CLINDAMYCIN HYDROCHLORIDE 150 MG/1
450 CAPSULE ORAL 3 TIMES DAILY
Qty: 63 CAPSULE | Refills: 0 | Status: SHIPPED | OUTPATIENT
Start: 2022-08-14 | End: 2022-08-21

## 2022-08-14 RX ADMIN — LIDOCAINE HYDROCHLORIDE 5 ML: 20 INJECTION, SOLUTION INFILTRATION; PERINEURAL at 20:30

## 2022-08-15 NOTE — ED PROVIDER NOTES
Subjective   History of Present Illness  31-year-old G4, P1 who believes her last menstrual period was around  presents for 2 complaints.  Has been having dental pain.  Trying to get in with dentist but has been unable to.  Is mandibular incisors on left.  No swelling, chest pain.  Also states that she has had scant amounts intermittently of dark blood from her vagina.  Much less then normal menstrual period.  No tissue.  No clots.  No discharge.  Has had very slight abdominal cramping with it as well.  Has not seen an OB/GYN yet for this pregnancy.      Review of Systems   Constitutional: Negative for chills and fever.   HENT: Positive for dental problem.    Gastrointestinal: Negative for constipation, diarrhea and vomiting.   Genitourinary: Positive for vaginal bleeding. Negative for dysuria, vaginal discharge and vaginal pain.   All other systems reviewed and are negative.      Past Medical History:   Diagnosis Date   • Asthma    • Kidney stone        Allergies   Allergen Reactions   • Penicillins        Past Surgical History:   Procedure Laterality Date   •  SECTION     • CHOLECYSTECTOMY     • DILATION AND CURETTAGE, DIAGNOSTIC / THERAPEUTIC     • URETERAL STENT INSERTION         No family history on file.    Social History     Socioeconomic History   • Marital status: Single   Tobacco Use   • Smoking status: Current Every Day Smoker     Packs/day: 0.50   Substance and Sexual Activity   • Alcohol use: No           Objective   Physical Exam  Constitutional:  No acute distress.  Head:  Atraumatic.  Normocephalic.   Eyes:  No scleral icterus. Normal conjunctiva  ENT:  Moist mucosa.  No nasal discharge present.  Poor dentition.  Multiple missing teeth.  No trismus.  Cardiovascular:  Well perfused.  Equal pulses.  Regular rate.  Normal capillary refill.    Pulmonary/Chest:  No respiratory distress.  Airway patent.  No tachypnea.  No accessory muscle usage.    Abdominal:  Non-distended. Non-tender.  "  Extremities:  No peripheral edema.  No Deformity  Skin:  Warm, dry  Neurological:  Alert, awake, and appropriate.  Normal speech.      Procedures  PROCEDURE: DENTAL NERVE BLOCK  Performed by the emergency provider  Consent:  Informed consent, after discussion of the risks, benefits, and alternatives to the procedure, was obtained  Indication: Pain control  Location: Dental nerve  Procedure:  The appropriate site was identified.  Inferior alveolar blocks and's mental block performed on left.  Both areas were aspirated before 2% lidocaine without epinephrine was injected.  1.5 mL placed in each location.  Post-Procedure:  The patient reported adequate analgesia, tolerated the procedure well, and there were no complications.           ED Course      /69   Pulse 75   Temp 98 °F (36.7 °C) (Temporal)   Resp 16   Ht 170.2 cm (67\")   Wt 118 kg (259 lb 4.2 oz)   LMP 06/02/2022 (Approximate)   SpO2 97%   BMI 40.61 kg/m²   Labs Reviewed   COMPREHENSIVE METABOLIC PANEL - Abnormal; Notable for the following components:       Result Value    Creatinine 0.56 (*)     Sodium 134 (*)     All other components within normal limits    Narrative:     GFR Normal >60  Chronic Kidney Disease <60  Kidney Failure <15     URINALYSIS W/ MICROSCOPIC IF INDICATED (NO CULTURE) - Abnormal; Notable for the following components:    Leuk Esterase, UA Trace (*)     All other components within normal limits   URINALYSIS, MICROSCOPIC ONLY - Abnormal; Notable for the following components:    RBC, UA 0-2 (*)     WBC, UA 0-2 (*)     All other components within normal limits   CBC WITH AUTO DIFFERENTIAL - Normal   HCG, QUANTITATIVE, PREGNANCY    Narrative:     HCG Ranges by Gestational Age    Females - non-pregnant premenopausal   </= 1mIU/mL HCG  Females - postmenopausal               </= 7mIU/mL HCG    3 Weeks       5.4   -      72 mIU/mL  4 Weeks      10.2   -     708 mIU/mL  5 Weeks       217   -   8,245 mIU/mL  6 Weeks       152   -  " 32,177 mIU/mL  7 Weeks     4,059   - 153,767 mIU/mL  8 Weeks    31,366   - 149,094 mIU/mL  9 Weeks    59,109   - 135,901 mIU/mL  10 Weeks   44,186   - 170,409 mIU/mL  12 Weeks   27,107   - 201,615 mIU/mL  14 Weeks   24,302   -  93,646 mIU/mL  15 Weeks   12,540   -  69,747 mIU/mL  16 Weeks    8,904   -  55,332 mIU/mL  17 Weeks    8,240   -  51,793 mIU/mL  18 Weeks    9,649   -  55,271 mIU/mL     TYPE AND SCREEN   BB ARMBAND CHECK   CBC AND DIFFERENTIAL    Narrative:     The following orders were created for panel order CBC & Differential.  Procedure                               Abnormality         Status                     ---------                               -----------         ------                     CBC Auto Differential[[id number]]        Normal              Final result                 Please view results for these tests on the individual orders.     Medications   sodium chloride 0.9 % flush 10 mL (has no administration in time range)   lidocaine (XYLOCAINE) 2% injection 5 mL (5 mL Infiltration Given 8/14/22 2030)     US Ob < 14 Weeks Single or First Gestation    Result Date: 8/14/2022  1. Single, live intrauterine pregnancy with estimated gestational age by ultrasound of 6 weeks 6 days. This is a significant discrepancy from the estimated gestational age by last menstrual period which is 10 weeks 4 days. Slot 63 Electronically signed by:  Haseeb Colbert M.D.  8/14/2022 6:35 PM    US Ob Transvaginal    Result Date: 8/14/2022  1. Single, live intrauterine pregnancy with estimated gestational age by ultrasound of 6 weeks 6 days. This is a significant discrepancy from the estimated gestational age by last menstrual period which is 10 weeks 4 days. Slot 63 Electronically signed by:  Haseeb Colbert M.D.  8/14/2022 6:35 PM                                         MDM  Patient Rh+.  Labs reassuring.  Intrauterine gestation at 6 weeks 6 days noted on ultrasound.  Differ significantly from dates.  Dental pain  completely relieved with dental blocks.  Patient nontoxic-appearing.  Reassuring vital signs.  Will DC home with OB/GYN and dental follow-up.  Patient agreeable with plan.  Patient states she has follow-up with OB/GYN coming up to [facility].  Final diagnoses:   Vaginal bleeding in pregnancy, first trimester   Dental infection       ED Disposition  ED Disposition     ED Disposition   Discharge    Condition   Stable    Comment   --             PATIENT CONNECTION - [facility] [id number] [phone]  In 3 days           Medication List      New Prescriptions    clindamycin 150 MG capsule  Commonly known as: CLEOCIN  Take 3 capsules by mouth 3 (Three) Times a Day for 7 days.           Where to Get Your Medications      These medications were sent to [facility] - [facility] [place] - [address] AT [address] - [phone]  - [phone] FX  [address] [place] [address]    Phone: [phone]   · clindamycin 150 MG capsule          Ruben Amanda MD  08/14/22 3663

## 2022-08-16 ENCOUNTER — TELEPHONE (OUTPATIENT)
Dept: EMERGENCY DEPT | Facility: HOSPITAL | Age: 32
End: 2022-08-16

## 2022-09-01 ENCOUNTER — APPOINTMENT (OUTPATIENT)
Dept: ULTRASOUND IMAGING | Facility: HOSPITAL | Age: 32
End: 2022-09-01

## 2022-09-01 ENCOUNTER — HOSPITAL ENCOUNTER (EMERGENCY)
Facility: HOSPITAL | Age: 32
Discharge: HOME | End: 2022-09-01
Admitting: EMERGENCY MEDICINE

## 2022-09-01 VITALS
RESPIRATION RATE: 16 BRPM | TEMPERATURE: 97.9 F | DIASTOLIC BLOOD PRESSURE: 72 MMHG | OXYGEN SATURATION: 98 % | BODY MASS INDEX: 40.48 KG/M2 | HEIGHT: 67 IN | SYSTOLIC BLOOD PRESSURE: 109 MMHG | HEART RATE: 78 BPM | WEIGHT: 257.94 LBS

## 2022-09-01 DIAGNOSIS — O46.90 VAGINAL BLEEDING DURING PREGNANCY: Primary | ICD-10-CM

## 2022-09-01 LAB
ALBUMIN SERPL-MCNC: 3.6 G/DL (ref 3.5–5.2)
ALBUMIN/GLOB SERPL: 1.1 G/DL
ALP SERPL-CCNC: 91 U/L (ref 39–117)
ALT SERPL W P-5'-P-CCNC: 19 U/L (ref 1–33)
ANION GAP SERPL CALCULATED.3IONS-SCNC: 10 MMOL/L (ref 5–15)
AST SERPL-CCNC: 21 U/L (ref 1–32)
BASOPHILS # BLD AUTO: 0 10*3/MM3 (ref 0–0.2)
BASOPHILS NFR BLD AUTO: 0.8 % (ref 0–1.5)
BILIRUB SERPL-MCNC: 0.2 MG/DL (ref 0–1.2)
BILIRUB UR QL STRIP: NEGATIVE
BUN SERPL-MCNC: 6 MG/DL (ref 6–20)
BUN/CREAT SERPL: 10.5 (ref 7–25)
CALCIUM SPEC-SCNC: 9.4 MG/DL (ref 8.6–10.5)
CHLORIDE SERPL-SCNC: 102 MMOL/L (ref 98–107)
CLARITY UR: CLEAR
CO2 SERPL-SCNC: 25 MMOL/L (ref 22–29)
COLOR UR: YELLOW
CREAT SERPL-MCNC: 0.57 MG/DL (ref 0.57–1)
DEPRECATED RDW RBC AUTO: 45.1 FL (ref 37–54)
EGFRCR SERPLBLD CKD-EPI 2021: 124 ML/MIN/1.73
EOSINOPHIL # BLD AUTO: 0 10*3/MM3 (ref 0–0.4)
EOSINOPHIL NFR BLD AUTO: 0.6 % (ref 0.3–6.2)
ERYTHROCYTE [DISTWIDTH] IN BLOOD BY AUTOMATED COUNT: 14.9 % (ref 12.3–15.4)
GLOBULIN UR ELPH-MCNC: 3.4 GM/DL
GLUCOSE SERPL-MCNC: 86 MG/DL (ref 65–99)
GLUCOSE UR STRIP-MCNC: NEGATIVE MG/DL
HCG INTACT+B SERPL-ACNC: NORMAL MIU/ML
HCT VFR BLD AUTO: 37.9 % (ref 34–46.6)
HGB BLD-MCNC: 12.5 G/DL (ref 12–15.9)
HGB UR QL STRIP.AUTO: NEGATIVE
KETONES UR QL STRIP: NEGATIVE
LEUKOCYTE ESTERASE UR QL STRIP.AUTO: NEGATIVE
LYMPHOCYTES # BLD AUTO: 2.4 10*3/MM3 (ref 0.7–3.1)
LYMPHOCYTES NFR BLD AUTO: 42 % (ref 19.6–45.3)
MCH RBC QN AUTO: 28.6 PG (ref 26.6–33)
MCHC RBC AUTO-ENTMCNC: 33.1 G/DL (ref 31.5–35.7)
MCV RBC AUTO: 86.5 FL (ref 79–97)
MONOCYTES # BLD AUTO: 0.5 10*3/MM3 (ref 0.1–0.9)
MONOCYTES NFR BLD AUTO: 9.2 % (ref 5–12)
NEUTROPHILS NFR BLD AUTO: 2.7 10*3/MM3 (ref 1.7–7)
NEUTROPHILS NFR BLD AUTO: 47.4 % (ref 42.7–76)
NITRITE UR QL STRIP: NEGATIVE
NRBC BLD AUTO-RTO: 0.1 /100 WBC (ref 0–0.2)
PH UR STRIP.AUTO: 7 [PH] (ref 5–8)
PLATELET # BLD AUTO: 217 10*3/MM3 (ref 140–450)
PMV BLD AUTO: 9.3 FL (ref 6–12)
POTASSIUM SERPL-SCNC: 4.2 MMOL/L (ref 3.5–5.2)
PROT SERPL-MCNC: 7 G/DL (ref 6–8.5)
PROT UR QL STRIP: NEGATIVE
RBC # BLD AUTO: 4.38 10*6/MM3 (ref 3.77–5.28)
SODIUM SERPL-SCNC: 137 MMOL/L (ref 136–145)
SP GR UR STRIP: 1.01 (ref 1–1.03)
UROBILINOGEN UR QL STRIP: NORMAL
WBC NRBC COR # BLD: 5.6 10*3/MM3 (ref 3.4–10.8)

## 2022-09-01 PROCEDURE — 81003 URINALYSIS AUTO W/O SCOPE: CPT | Performed by: NURSE PRACTITIONER

## 2022-09-01 PROCEDURE — 80053 COMPREHEN METABOLIC PANEL: CPT | Performed by: NURSE PRACTITIONER

## 2022-09-01 PROCEDURE — P9612 CATHETERIZE FOR URINE SPEC: HCPCS

## 2022-09-01 PROCEDURE — 93976 VASCULAR STUDY: CPT

## 2022-09-01 PROCEDURE — 85025 COMPLETE CBC W/AUTO DIFF WBC: CPT | Performed by: NURSE PRACTITIONER

## 2022-09-01 PROCEDURE — 99283 EMERGENCY DEPT VISIT LOW MDM: CPT

## 2022-09-01 PROCEDURE — 76801 OB US < 14 WKS SINGLE FETUS: CPT

## 2022-09-01 PROCEDURE — 84702 CHORIONIC GONADOTROPIN TEST: CPT | Performed by: NURSE PRACTITIONER

## 2022-09-01 RX ORDER — SODIUM CHLORIDE 0.9 % (FLUSH) 0.9 %
10 SYRINGE (ML) INJECTION AS NEEDED
Status: DISCONTINUED | OUTPATIENT
Start: 2022-09-01 | End: 2022-09-01 | Stop reason: HOSPADM

## 2022-09-01 NOTE — DISCHARGE INSTRUCTIONS
Pelvic rest.  Drink plenty of fluids.  Follow-up with your OB GYN.  Return for new or worsening symptoms.

## 2022-09-01 NOTE — ED PROVIDER NOTES
Subjective   Patient is a 32-year-old obese white female who presents today with complaints of vaginal spotting and lower abdominal cramping.  She states she woke this morning with symptoms.  She states she is approximately 10 weeks pregnant  A2.  She states she had a similar episode a couple of weeks ago and was seen here in the ED.  She states at that time she had an ultrasound that showed intrauterine pregnancy with estimated gestational age of 6 weeks and 6 days.  Patient states she was doing well until this morning when she got up to use the restroom.  She states when she wiped she noticed some pinkish colored bloody fluid on her tissue paper.  She reports of abdominal cramping.  She states since arrival to the ED this has subsided.  She denies any further cramping or bleeding.  She denies blood thinner use.  She denies any known injury or trauma.  She denies any urinary complaint.  She denies fever chills abdominal pain flank pain vaginal discharge or other complaint.          Review of Systems   Constitutional: Negative.    HENT: Negative.    Eyes: Negative.    Respiratory: Negative.    Cardiovascular: Negative.    Gastrointestinal: Negative.    Genitourinary: Positive for pelvic pain and vaginal bleeding. Negative for decreased urine volume, dysuria, flank pain, frequency, urgency and vaginal discharge.   Musculoskeletal: Negative.    Skin: Negative.    Neurological: Negative.        Past Medical History:   Diagnosis Date   • Asthma    • Kidney stone        Allergies   Allergen Reactions   • Penicillins        Past Surgical History:   Procedure Laterality Date   •  SECTION     • CHOLECYSTECTOMY     • DILATION AND CURETTAGE, DIAGNOSTIC / THERAPEUTIC     • URETERAL STENT INSERTION         No family history on file.    Social History     Socioeconomic History   • Marital status: Single   Tobacco Use   • Smoking status: Current Every Day Smoker     Packs/day: 0.50   Substance and Sexual Activity   •  Alcohol use: No           Objective   Physical Exam  Vital signs and triage nurse note reviewed.  Constitutional: Awake, alert; well-developed and well-nourished. No acute distress is noted.  Obese.  HEENT: Normocephalic, atraumatic; pupils are PERRL with intact EOM; oropharynx is pink and moist without exudate or erythema.  No drooling or pooling of oral secretions.  Neck: Supple, full range of motion without pain; no cervical lymphadenopathy. Normal phonation.  Cardiovascular: Regular rate and rhythm, normal S1-S2.  No murmur noted.  Pulmonary: Respiratory effort regular nonlabored, breath sounds clear to auscultation all fields.  Abdomen: Soft, nontender, nondistended with normoactive bowel sounds; no rebound or guarding.  Musculoskeletal: Independent range of motion of all extremities with no palpable tenderness or edema.  Neuro: Alert oriented x3, speech is clear and appropriate, GCS 15.    Skin: Flesh tone, warm, dry, intact; no erythematous or petechial rash or lesion.      Procedures           ED Course      Labs Reviewed   URINALYSIS W/ CULTURE IF INDICATED - Normal    Narrative:     In absence of clinical symptoms, the presence of pyuria, bacteria, and/or nitrites on the urinalysis result does not correlate with infection.  Urine microscopic not indicated.   CBC WITH AUTO DIFFERENTIAL - Normal   COMPREHENSIVE METABOLIC PANEL    Narrative:     GFR Normal >60  Chronic Kidney Disease <60  Kidney Failure <15     HCG, QUANTITATIVE, PREGNANCY    Narrative:     HCG Ranges by Gestational Age    Females - non-pregnant premenopausal   </= 1mIU/mL HCG  Females - postmenopausal               </= 7mIU/mL HCG    3 Weeks       5.4   -      72 mIU/mL  4 Weeks      10.2   -     708 mIU/mL  5 Weeks       217   -   8,245 mIU/mL  6 Weeks       152   -  32,177 mIU/mL  7 Weeks     4,059   - 153,767 mIU/mL  8 Weeks    31,366   - 149,094 mIU/mL  9 Weeks    59,109   - 135,901 mIU/mL  10 Weeks   44,186   - 170,409 mIU/mL  12 Weeks    27,107   - 201,615 mIU/mL  14 Weeks   24,302   -  93,646 mIU/mL  15 Weeks   12,540   -  69,747 mIU/mL  16 Weeks    8,904   -  55,332 mIU/mL  17 Weeks    8,240   -  51,793 mIU/mL  18 Weeks    9,649   -  55,271 mIU/mL     CBC AND DIFFERENTIAL    Narrative:     The following orders were created for panel order CBC & Differential.  Procedure                               Abnormality         Status                     ---------                               -----------         ------                     CBC Auto Differential[[id number]]        Normal              Final result                 Please view results for these tests on the individual orders.     US Ob < 14 Weeks Single or First Gestation    Result Date: 9/1/2022   1. Single viable intrauterine pregnancy with heart rate 149 bpm. Estimated sonographic gestational age is 9 weeks 1 day. Estimated sonographic date of delivery is 4/5/2023. 2. No acute pelvic findings. She is seen. 3. 1.6 cm left ovarian cyst.  Electronically Signed By-Esthela Begum MD On:9/1/2022 8:15 AM This report was finalized on [id number] by  Esthela Begum MD.    Medications   sodium chloride 0.9 % flush 10 mL (has no administration in time range)     Labs Reviewed   URINALYSIS W/ CULTURE IF INDICATED - Normal    Narrative:     In absence of clinical symptoms, the presence of pyuria, bacteria, and/or nitrites on the urinalysis result does not correlate with infection.  Urine microscopic not indicated.   CBC WITH AUTO DIFFERENTIAL - Normal   COMPREHENSIVE METABOLIC PANEL    Narrative:     GFR Normal >60  Chronic Kidney Disease <60  Kidney Failure <15     HCG, QUANTITATIVE, PREGNANCY    Narrative:     HCG Ranges by Gestational Age    Females - non-pregnant premenopausal   </= 1mIU/mL HCG  Females - postmenopausal               </= 7mIU/mL HCG    3 Weeks       5.4   -      72 mIU/mL  4 Weeks      10.2   -     708 mIU/mL  5 Weeks       217   -   8,245 mIU/mL  6 Weeks       152   -  32,177  mIU/mL  7 Weeks     4,059   - 153,767 mIU/mL  8 Weeks    31,366   - 149,094 mIU/mL  9 Weeks    59,109   - 135,901 mIU/mL  10 Weeks   44,186   - 170,409 mIU/mL  12 Weeks   27,107   - 201,615 mIU/mL  14 Weeks   24,302   -  93,646 mIU/mL  15 Weeks   12,540   -  69,747 mIU/mL  16 Weeks    8,904   -  55,332 mIU/mL  17 Weeks    8,240   -  51,793 mIU/mL  18 Weeks    9,649   -  55,271 mIU/mL     CBC AND DIFFERENTIAL    Narrative:     The following orders were created for panel order CBC & Differential.  Procedure                               Abnormality         Status                     ---------                               -----------         ------                     CBC Auto Differential[[id number]]        Normal              Final result                 Please view results for these tests on the individual orders.     US Ob < 14 Weeks Single or First Gestation    Result Date: 9/1/2022   1. Single viable intrauterine pregnancy with heart rate 149 bpm. Estimated sonographic gestational age is 9 weeks 1 day. Estimated sonographic date of delivery is 4/5/2023. 2. No acute pelvic findings. She is seen. 3. 1.6 cm left ovarian cyst.  Electronically Signed By-Esthela Begum MD On:9/1/2022 8:15 AM This report was finalized on [id number] by  Esthela Begum MD.    Medications   sodium chloride 0.9 % flush 10 mL (has no administration in time range)                                            MDM  Number of Diagnoses or Management Options  Vaginal bleeding during pregnancy  Diagnosis management comments: Comorbidities: None  Differentials: Threatened AB, spontaneous AB, subchorionic hemorrhage, UTI;this list is not all inclusive and does not constitute the entirety of considered causes  Discussion with provider:  Radiology interpretation: X-rays reviewed by me and interpreted by radiologist: As above  Lab interpretation: Labs viewed by me significant for: As above    Patient had IV established.  She had labs and ultrasound  obtained.    Work-up: CBC and metabolic panel are unremarkable.  Serum hCG is 69,580 which is increased from 2 weeks ago of 22,000.  Urinalysis shows no blood or sign of infection.  Chart review reveals patient had blood typing on her visit 2 weeks ago and was found to be O+.  Ultrasound shows intrauterine pregnancy, fetal heart rate 149 bpm, estimated gestational age 9 weeks 1 day, no acute findings.  Left ovarian cyst.    On reexamination patient resting comfortably and in no distress.  She has no new complaints.  She is had no bleeding or pain since arrival to the ED.  She is hemodynamically stable, afebrile and well-appearing.  Her abdomen is soft and nontender.  Blood type O+ on last work-up here 2 weeks ago.  No need for RhoGAM.    Diagnosis and treatment plan discussed with patient.  Patient agreeable to plan.   I discussed findings with patient who voices understanding of discharge instructions, signs and symptoms requiring return to ED; discharged improved and in stable condition with follow up for re-evaluation.             Amount and/or Complexity of Data Reviewed  Clinical lab tests: ordered and reviewed  Tests in the radiology section of CPT®: ordered and reviewed    Patient Progress  Patient progress: stable      Final diagnoses:   Vaginal bleeding during pregnancy       ED Disposition  ED Disposition     ED Disposition   Discharge    Condition   Stable    Comment   --             Your OB/GYN    Schedule an appointment as soon as possible for a visit       [facility] [address] [address] [phone]             Medication List      Stop    diclofenac 75 MG EC tablet  Commonly known as: VOLTAREN     ondansetron ODT 4 MG disintegrating tablet  Commonly known as: ZOFRAN-ODT             Hannah Timmons APRN  09/01/22 9964

## 2022-09-01 NOTE — ED NOTES
Pt is 10 weeks pregnant, has been seen at [facility], is scheduled to see [facility] [address] on 9/26. Has been having cramping on right side for last 3-4 days but woke up at 6 am and noticed pink on her toilet paper, when arrived at hospital had severe cramping, and noticed increased amount of pink blood on toilet paper. Cramping has subsided and patient states has not had to change a pad or had any clots.

## 2022-09-05 ENCOUNTER — HOSPITAL ENCOUNTER (EMERGENCY)
Facility: HOSPITAL | Age: 32
Discharge: HOME | End: 2022-09-05
Attending: EMERGENCY MEDICINE | Admitting: EMERGENCY MEDICINE

## 2022-09-05 ENCOUNTER — APPOINTMENT (OUTPATIENT)
Dept: ULTRASOUND IMAGING | Facility: HOSPITAL | Age: 32
End: 2022-09-05

## 2022-09-05 VITALS
TEMPERATURE: 98.1 F | WEIGHT: 257.94 LBS | HEIGHT: 61 IN | RESPIRATION RATE: 17 BRPM | DIASTOLIC BLOOD PRESSURE: 73 MMHG | HEART RATE: 87 BPM | OXYGEN SATURATION: 98 % | SYSTOLIC BLOOD PRESSURE: 111 MMHG | BODY MASS INDEX: 48.7 KG/M2

## 2022-09-05 DIAGNOSIS — O20.0 THREATENED ABORTION: Primary | ICD-10-CM

## 2022-09-05 LAB
ALBUMIN SERPL-MCNC: 3.6 G/DL (ref 3.5–5.2)
ALBUMIN/GLOB SERPL: 1.2 G/DL
ALP SERPL-CCNC: 84 U/L (ref 39–117)
ALT SERPL W P-5'-P-CCNC: 18 U/L (ref 1–33)
ANION GAP SERPL CALCULATED.3IONS-SCNC: 9 MMOL/L (ref 5–15)
AST SERPL-CCNC: 16 U/L (ref 1–32)
BASOPHILS # BLD AUTO: 0 10*3/MM3 (ref 0–0.2)
BASOPHILS NFR BLD AUTO: 0.3 % (ref 0–1.5)
BILIRUB SERPL-MCNC: 0.3 MG/DL (ref 0–1.2)
BUN SERPL-MCNC: 6 MG/DL (ref 6–20)
BUN/CREAT SERPL: 10.2 (ref 7–25)
CALCIUM SPEC-SCNC: 9 MG/DL (ref 8.6–10.5)
CHLORIDE SERPL-SCNC: 102 MMOL/L (ref 98–107)
CO2 SERPL-SCNC: 26 MMOL/L (ref 22–29)
CREAT SERPL-MCNC: 0.59 MG/DL (ref 0.57–1)
DEPRECATED RDW RBC AUTO: 44.2 FL (ref 37–54)
EGFRCR SERPLBLD CKD-EPI 2021: 123 ML/MIN/1.73
EOSINOPHIL # BLD AUTO: 0 10*3/MM3 (ref 0–0.4)
EOSINOPHIL NFR BLD AUTO: 0.6 % (ref 0.3–6.2)
ERYTHROCYTE [DISTWIDTH] IN BLOOD BY AUTOMATED COUNT: 14.6 % (ref 12.3–15.4)
GLOBULIN UR ELPH-MCNC: 2.9 GM/DL
GLUCOSE SERPL-MCNC: 90 MG/DL (ref 65–99)
HCG INTACT+B SERPL-ACNC: NORMAL MIU/ML
HCT VFR BLD AUTO: 37.2 % (ref 34–46.6)
HGB BLD-MCNC: 12.5 G/DL (ref 12–15.9)
LYMPHOCYTES # BLD AUTO: 2.1 10*3/MM3 (ref 0.7–3.1)
LYMPHOCYTES NFR BLD AUTO: 36.8 % (ref 19.6–45.3)
MCH RBC QN AUTO: 28.9 PG (ref 26.6–33)
MCHC RBC AUTO-ENTMCNC: 33.6 G/DL (ref 31.5–35.7)
MCV RBC AUTO: 86 FL (ref 79–97)
MONOCYTES # BLD AUTO: 0.5 10*3/MM3 (ref 0.1–0.9)
MONOCYTES NFR BLD AUTO: 8.3 % (ref 5–12)
NEUTROPHILS NFR BLD AUTO: 3.1 10*3/MM3 (ref 1.7–7)
NEUTROPHILS NFR BLD AUTO: 54 % (ref 42.7–76)
NRBC BLD AUTO-RTO: 0.1 /100 WBC (ref 0–0.2)
PLATELET # BLD AUTO: 191 10*3/MM3 (ref 140–450)
PMV BLD AUTO: 8.6 FL (ref 6–12)
POTASSIUM SERPL-SCNC: 4.3 MMOL/L (ref 3.5–5.2)
PROT SERPL-MCNC: 6.5 G/DL (ref 6–8.5)
RBC # BLD AUTO: 4.32 10*6/MM3 (ref 3.77–5.28)
SODIUM SERPL-SCNC: 137 MMOL/L (ref 136–145)
WBC NRBC COR # BLD: 5.7 10*3/MM3 (ref 3.4–10.8)

## 2022-09-05 PROCEDURE — 76815 OB US LIMITED FETUS(S): CPT

## 2022-09-05 PROCEDURE — 85025 COMPLETE CBC W/AUTO DIFF WBC: CPT | Performed by: EMERGENCY MEDICINE

## 2022-09-05 PROCEDURE — 84702 CHORIONIC GONADOTROPIN TEST: CPT | Performed by: EMERGENCY MEDICINE

## 2022-09-05 PROCEDURE — 80053 COMPREHEN METABOLIC PANEL: CPT | Performed by: EMERGENCY MEDICINE

## 2022-09-05 PROCEDURE — 93976 VASCULAR STUDY: CPT

## 2022-09-05 PROCEDURE — 99283 EMERGENCY DEPT VISIT LOW MDM: CPT

## 2022-09-05 RX ORDER — SODIUM CHLORIDE 0.9 % (FLUSH) 0.9 %
10 SYRINGE (ML) INJECTION AS NEEDED
Status: DISCONTINUED | OUTPATIENT
Start: 2022-09-05 | End: 2022-09-05 | Stop reason: HOSPADM

## 2022-09-05 NOTE — ED PROVIDER NOTES
"Subjective   History of Present Illness  History Provided By: Patient    Chief Complaint: Vaginal bleeding with clots  Onset: Started approximately an hour ago  Timing: Quickly improved and now having very slight spotting  Location: Pelvic  Quality: Blood and clots  Severity: Moderate  Modifying Factors: Self resolving    Other: Patient without pain or cramping.  No fevers or chills.  Has been feeling well otherwise.    Review of Systems   Genitourinary: Positive for vaginal bleeding. Negative for vaginal discharge and vaginal pain.   All other systems reviewed and are negative.      Past Medical History:   Diagnosis Date   • Asthma    • Kidney stone        Allergies   Allergen Reactions   • Penicillins        Past Surgical History:   Procedure Laterality Date   •  SECTION     • CHOLECYSTECTOMY     • DILATION AND CURETTAGE, DIAGNOSTIC / THERAPEUTIC     • URETERAL STENT INSERTION         No family history on file.    Social History     Socioeconomic History   • Marital status: Single   Tobacco Use   • Smoking status: Current Every Day Smoker     Packs/day: 0.50   Substance and Sexual Activity   • Alcohol use: No           Objective   Physical Exam  Constitutional:  No acute distress.  Head:  Atraumatic.  Normocephalic.   Eyes:  No scleral icterus. Normal conjunctiva  ENT:  Moist mucosa.  No nasal discharge present.  Cardiovascular:  Well perfused.  Equal pulses.  Regular rate.  Normal capillary refill.    Pulmonary/Chest:  No respiratory distress.  Airway patent.  No tachypnea.  No accessory muscle usage.    Abdominal:  Non-distended. Non-tender.   Extremities:  No peripheral edema.  No Deformity  Skin:  Warm, dry  Neurological:  Alert, awake, and appropriate.  Normal speech.      Procedures           ED Course      /73 (BP Location: Left arm, Patient Position: Sitting)   Pulse 87   Temp 98.1 °F (36.7 °C) (Temporal)   Resp 17   Ht 154.9 cm (61\")   Wt 117 kg (257 lb 15 oz)   LMP 2022 " (Approximate)   SpO2 98%   BMI 48.74 kg/m²   Labs Reviewed   CBC WITH AUTO DIFFERENTIAL - Normal   COMPREHENSIVE METABOLIC PANEL    Narrative:     GFR Normal >60  Chronic Kidney Disease <60  Kidney Failure <15     HCG, QUANTITATIVE, PREGNANCY    Narrative:     HCG Ranges by Gestational Age    Females - non-pregnant premenopausal   </= 1mIU/mL HCG  Females - postmenopausal               </= 7mIU/mL HCG    3 Weeks       5.4   -      72 mIU/mL  4 Weeks      10.2   -     708 mIU/mL  5 Weeks       217   -   8,245 mIU/mL  6 Weeks       152   -  32,177 mIU/mL  7 Weeks     4,059   - 153,767 mIU/mL  8 Weeks    31,366   - 149,094 mIU/mL  9 Weeks    59,109   - 135,901 mIU/mL  10 Weeks   44,186   - 170,409 mIU/mL  12 Weeks   27,107   - 201,615 mIU/mL  14 Weeks   24,302   -  93,646 mIU/mL  15 Weeks   12,540   -  69,747 mIU/mL  16 Weeks    8,904   -  55,332 mIU/mL  17 Weeks    8,240   -  51,793 mIU/mL  18 Weeks    9,649   -  55,271 mIU/mL     CBC AND DIFFERENTIAL    Narrative:     The following orders were created for panel order CBC & Differential.  Procedure                               Abnormality         Status                     ---------                               -----------         ------                     CBC Auto Differential[[id number]]        Normal              Final result                 Please view results for these tests on the individual orders.     Medications   sodium chloride 0.9 % flush 10 mL (has no administration in time range)     US Ob Limited 1 + Fetuses    Result Date: 9/5/2022  Living early intrauterine pregnancy  Ovaries appear grossly unremarkable in appearance  Electronically Signed By-Ananth Hodges On:9/5/2022 12:19 PM This report was finalized on [id number] by  Ananth Hodges, .                                         [facility]  Patient states bleeding has resolved.  Slight subchorionic hemorrhage on ultrasound.  Abdomen benign.  Has follow-up with OB/GYN.  Will DC home.  Final  diagnoses:   Threatened        ED Disposition  ED Disposition     ED Disposition   Discharge    Condition   Stable    Comment   --             [facility]  [facility] [address]  [phone]  In 3 days           Medication List      No changes were made to your prescriptions during this visit.          Ruben Amanda MD  22 1546

## 2022-11-15 ENCOUNTER — HOSPITAL ENCOUNTER (EMERGENCY)
Facility: HOSPITAL | Age: 32
Discharge: HOME | End: 2022-11-15
Admitting: EMERGENCY MEDICINE

## 2022-11-15 VITALS
TEMPERATURE: 98 F | OXYGEN SATURATION: 98 % | WEIGHT: 260 LBS | HEIGHT: 67 IN | DIASTOLIC BLOOD PRESSURE: 62 MMHG | SYSTOLIC BLOOD PRESSURE: 116 MMHG | HEART RATE: 81 BPM | RESPIRATION RATE: 18 BRPM | BODY MASS INDEX: 40.81 KG/M2

## 2022-11-15 DIAGNOSIS — A69.1: ICD-10-CM

## 2022-11-15 DIAGNOSIS — K04.7 DENTAL ABSCESS: ICD-10-CM

## 2022-11-15 DIAGNOSIS — R68.84 PAIN IN LOWER JAW: Primary | ICD-10-CM

## 2022-11-15 PROCEDURE — 99283 EMERGENCY DEPT VISIT LOW MDM: CPT

## 2022-11-15 RX ORDER — CLINDAMYCIN HYDROCHLORIDE 300 MG/1
300 CAPSULE ORAL ONCE
Status: COMPLETED | OUTPATIENT
Start: 2022-11-15 | End: 2022-11-15

## 2022-11-15 RX ORDER — ACETAMINOPHEN AND CODEINE PHOSPHATE 300; 30 MG/1; MG/1
1 TABLET ORAL ONCE
Status: COMPLETED | OUTPATIENT
Start: 2022-11-15 | End: 2022-11-15

## 2022-11-15 RX ORDER — CLINDAMYCIN HYDROCHLORIDE 300 MG/1
300 CAPSULE ORAL 3 TIMES DAILY
Qty: 30 CAPSULE | Refills: 0 | Status: SHIPPED | OUTPATIENT
Start: 2022-11-15 | End: 2022-11-25

## 2022-11-15 RX ADMIN — CLINDAMYCIN HYDROCHLORIDE 300 MG: 300 CAPSULE ORAL at 14:35

## 2022-11-15 RX ADMIN — ACETAMINOPHEN AND CODEINE PHOSPHATE 1 TABLET: 300; 30 TABLET ORAL at 14:22

## 2022-11-15 NOTE — ED PROVIDER NOTES
Subjective   History of Present Illness  Patient is a 32-year-old obese female who is 20 weeks pregnant who comes in today complaining of right lower dental pain she has extensive necrotizing gingivitis as well as an abscess in her right lower jaw that she states she knows that she has because she saw dentist and they would not treat her because she is 20 weeks pregnant.  She states that the pain became uncontrollable today which brought her to the emergency room.  She is alert oriented nontoxic she denies any fever chills nausea or vomiting or difficulty breathing or swallowing.        Review of Systems   Constitutional: Negative for chills, fatigue and fever.   HENT: Positive for dental problem and sinus pressure. Negative for congestion, tinnitus and trouble swallowing.    Eyes: Negative for photophobia, discharge and redness.   Respiratory: Negative for cough and shortness of breath.    Cardiovascular: Negative for chest pain and palpitations.   Gastrointestinal: Negative for abdominal pain, diarrhea, nausea and vomiting.   Genitourinary: Negative for dysuria, frequency and urgency.   Musculoskeletal: Negative for back pain, joint swelling and myalgias.        Right lower jaw pain and swelling   Skin: Negative for rash.   Neurological: Negative for dizziness and headaches.   Psychiatric/Behavioral: Negative for confusion.   All other systems reviewed and are negative.      Past Medical History:   Diagnosis Date   • Asthma    • Kidney stone        Allergies   Allergen Reactions   • Penicillins        Past Surgical History:   Procedure Laterality Date   •  SECTION     • CHOLECYSTECTOMY     • DILATION AND CURETTAGE, DIAGNOSTIC / THERAPEUTIC     • URETERAL STENT INSERTION         No family history on file.    Social History     Socioeconomic History   • Marital status: Single   Tobacco Use   • Smoking status: Every Day     Packs/day: 0.50     Types: Cigarettes   Substance and Sexual Activity   • Alcohol use:  No           Objective   Physical Exam  Vitals reviewed.   Constitutional:       General: She is not in acute distress.     Appearance: She is well-developed. She is obese. She is not ill-appearing, toxic-appearing or diaphoretic.   HENT:      Head: Normocephalic and atraumatic.      Right Ear: Tympanic membrane and external ear normal.      Left Ear: Tympanic membrane and external ear normal.      Nose: Nose normal.      Mouth/Throat:      Lips: Pink.      Mouth: Mucous membranes are moist.      Dentition: Abnormal dentition. Dental tenderness, gingival swelling, dental caries, dental abscesses and gum lesions present.      Pharynx: Oropharynx is clear. Uvula midline.      Tonsils: 0 on the right. 0 on the left.      Comments: There is extensive necrotizing gingivitis to the lower teeth and gums the gums are edematous and erythemic and dental carry is present-there is no Ludewig's angina noted there is no difficulty breathing or swallowing  Eyes:      Conjunctiva/sclera: Conjunctivae normal.      Pupils: Pupils are equal, round, and reactive to light.   Cardiovascular:      Rate and Rhythm: Normal rate and regular rhythm.      Heart sounds: Normal heart sounds.   Pulmonary:      Effort: Pulmonary effort is normal. No respiratory distress.      Breath sounds: Normal breath sounds. No wheezing.   Abdominal:      General: Bowel sounds are normal. There is no distension.      Palpations: Abdomen is soft. There is no mass.      Tenderness: There is no abdominal tenderness. There is no guarding or rebound.   Musculoskeletal:         General: No deformity. Normal range of motion.      Cervical back: Normal range of motion and neck supple.   Skin:     General: Skin is warm and dry.      Capillary Refill: Capillary refill takes less than 2 seconds.   Neurological:      General: No focal deficit present.      Mental Status: She is alert and oriented to person, place, and time.      GCS: GCS eye subscore is 4. GCS verbal  "subscore is 5. GCS motor subscore is 6.      Cranial Nerves: No cranial nerve deficit.      Sensory: No sensory deficit.      Deep Tendon Reflexes: Reflexes normal.   Psychiatric:         Mood and Affect: Mood normal.         Behavior: Behavior normal.         Procedures           ED Course      /62   Pulse 81   Temp 98 °F (36.7 °C) (Temporal)   Resp 18   Ht 170.2 cm (67\")   Wt 118 kg (260 lb)   LMP 06/02/2022 (Approximate)   SpO2 98%   BMI 40.72 kg/m²   Labs Reviewed - No data to display  Medications   dental ball oral suspension with diphenhydrAMINE (has no administration in time range)   acetaminophen-codeine (TYLENOL #3) 300-30 MG per tablet 1 tablet (has no administration in time range)   clindamycin (CLEOCIN) capsule 300 mg (has no administration in time range)     No radiology results for the last day                                       MDM  Number of Diagnoses or Management Options  Dental abscess  Necrotizing gingivitis  Pain in lower jaw  Diagnosis management comments: Patient was treated with Tylenol dental balls and given first dose of clindamycin here in the emergency room she was discharged home with clindamycin and told to use Tylenol and the dental balls at home she was advised to see dentist as soon as possible she was given the information for [facility] and advised that she could follow-up there if needed she was advised to return if worse    Risk of Complications, Morbidity, and/or Mortality  Presenting problems: high  Diagnostic procedures: high  Management options: high    Patient Progress  Patient progress: stable      Final diagnoses:   Pain in lower jaw   Necrotizing gingivitis   Dental abscess       ED Disposition  ED Disposition     ED Disposition   Discharge    Condition   Stable    Comment   --               [facility]   [phone]    Call today for follow-up appoint         Medication List      New Prescriptions    clindamycin 300 MG " capsule  Commonly known as: CLEOCIN  Take 1 capsule by mouth 3 (Three) Times a Day for 10 days.           Where to Get Your Medications      These medications were sent to [id number] - [place], [place] - [address] AT [address] - [phone]  - [phone] FX  [address] [place] [address]    Phone: [phone]   · clindamycin 300 MG capsule          Janee Spears, APRN  11/15/22 9425

## 2022-11-15 NOTE — DISCHARGE INSTRUCTIONS
See a dentist as soon as possible  Take antibiotics till gone    Use Tylenol as needed for pain as well as the dental balls.    Return if worse   stated

## 2022-11-18 ENCOUNTER — HOSPITAL ENCOUNTER (EMERGENCY)
Facility: HOSPITAL | Age: 32
Discharge: LEFT BEFORE DISCHARGE | End: 2022-11-18
Attending: EMERGENCY MEDICINE

## 2022-11-18 PROCEDURE — 99211 OFF/OP EST MAY X REQ PHY/QHP: CPT | Performed by: EMERGENCY MEDICINE

## 2022-12-14 ENCOUNTER — HOSPITAL ENCOUNTER (OUTPATIENT)
Facility: HOSPITAL | Age: 32
End: 2022-12-14
Attending: OBSTETRICS & GYNECOLOGY | Admitting: OBSTETRICS & GYNECOLOGY

## 2022-12-14 ENCOUNTER — HOSPITAL ENCOUNTER (OUTPATIENT)
Facility: HOSPITAL | Age: 32
Discharge: HOME | End: 2022-12-14
Attending: OBSTETRICS & GYNECOLOGY | Admitting: OBSTETRICS & GYNECOLOGY

## 2022-12-14 VITALS — OXYGEN SATURATION: 94 % | SYSTOLIC BLOOD PRESSURE: 112 MMHG | HEART RATE: 89 BPM | DIASTOLIC BLOOD PRESSURE: 53 MMHG

## 2022-12-14 PROCEDURE — G0463 HOSPITAL OUTPT CLINIC VISIT: HCPCS

## 2022-12-14 NOTE — NURSING NOTE
Pt presented to OB triage for complaints of SOA, dizziness, and productive cough. Pt denied LOF, vaginal bleeding, and reports positive fetal movement. Pt FHT dopplered at 143. Pt lung auscultated and pt has expiratory wheezing and ronchi in bilateral paniagua of lungs. Fred MCDERMOTT updated on pt and pt to be sent to ED for further treatment of respiratory symptoms. Pt cervical exam not performed r/t pt stating she had possible placenta previa.

## 2023-08-27 ENCOUNTER — HOSPITAL ENCOUNTER (OUTPATIENT)
Facility: HOSPITAL | Age: 33
Discharge: HOME | End: 2023-08-27
Attending: EMERGENCY MEDICINE | Admitting: EMERGENCY MEDICINE
Payer: MEDICAID

## 2023-08-27 VITALS
BODY MASS INDEX: 37.67 KG/M2 | HEART RATE: 82 BPM | SYSTOLIC BLOOD PRESSURE: 152 MMHG | DIASTOLIC BLOOD PRESSURE: 75 MMHG | OXYGEN SATURATION: 96 % | TEMPERATURE: 98.4 F | RESPIRATION RATE: 18 BRPM | HEIGHT: 67 IN | WEIGHT: 240 LBS

## 2023-08-27 DIAGNOSIS — K02.9 PAIN DUE TO DENTAL CARIES: Primary | ICD-10-CM

## 2023-08-27 PROCEDURE — G0463 HOSPITAL OUTPT CLINIC VISIT: HCPCS | Performed by: NURSE PRACTITIONER

## 2023-08-27 RX ORDER — IBUPROFEN 800 MG/1
800 TABLET ORAL EVERY 8 HOURS PRN
Qty: 30 TABLET | Refills: 0 | Status: SHIPPED | OUTPATIENT
Start: 2023-08-27

## 2023-08-27 RX ORDER — CLINDAMYCIN HYDROCHLORIDE 300 MG/1
300 CAPSULE ORAL 3 TIMES DAILY
Qty: 30 CAPSULE | Refills: 0 | Status: SHIPPED | OUTPATIENT
Start: 2023-08-27

## 2023-08-28 NOTE — DISCHARGE INSTRUCTIONS
Can add in Tylenol 500 mg every 8 hours in addition to Motrin    Take antibiotics as prescribed    Return Precautions    Although you are being discharged from the ED today, I encourage you to return for worsening symptoms.  Things can, and do, change such that treatment at home with medication may not be adequate.      Specifically, return for any of the following:    Chest pain, shortness of breath, pain or nausea and vomiting not controlled by medications provided.    Please make a follow up with your Primary Care Provider for a blood pressure recheck.

## 2023-08-28 NOTE — FSED PROVIDER NOTE
EMERGENCY DEPARTMENT ENCOUNTER    Room Number:    Date seen:  2023  Time seen: 20:44 EDT  PCP: Provider, No Known  Historian: patient    Discussed/obtained information from independent historians: n/a    HPI:  Chief complaint:dental pain, right jaw pain  A complete HPI/ROS/PMH/PSH/SH/FH are unobtainable due to: n/a  Context:Jacqueline Turcios is a 33 y.o. female with history of methadone use, chronic hepatitis C who presents to the ED with c/o several days of right lower dental and jaw pain.  She has 3 remaining teeth and is getting these pulled  next week at [facility] in preparation for dentures.  She just wants to make sure she doesn't  have any infection that would cause her extractions to be delayed.  She DENIES any fever, problems opening/closing her mouth or drooling.  She has had this problem before. She has used some ibuprofen for pain but feels like she is taking a lot of tablets.     External (non-ED) record review:  n/a    Chronic or social conditions impacting care:n/a    ALLERGIES  Penicillins    PAST MEDICAL HISTORY  Active Ambulatory Problems     Diagnosis Date Noted    No Active Ambulatory Problems     Resolved Ambulatory Problems     Diagnosis Date Noted    No Resolved Ambulatory Problems     Past Medical History:   Diagnosis Date    Asthma     Kidney stone        PAST SURGICAL HISTORY  Past Surgical History:   Procedure Laterality Date     SECTION      CHOLECYSTECTOMY      DILATION AND CURETTAGE, DIAGNOSTIC / THERAPEUTIC      URETERAL STENT INSERTION         FAMILY HISTORY  History reviewed. No pertinent family history.    SOCIAL HISTORY  Social History     Socioeconomic History    Marital status: Single   Tobacco Use    Smoking status: Every Day     Packs/day: 1.00     Types: Cigarettes    Smokeless tobacco: Never   Vaping Use    Vaping Use: Never used   Substance and Sexual Activity    Alcohol use: No    Drug use: Never    Sexual activity: Defer       REVIEW OF  SYSTEMS  Review of Systems    All systems reviewed and negative except for those discussed in HPI.     PHYSICAL EXAM    I have reviewed the triage vital signs and nursing notes.  Vitals:    08/27/23 2041   BP: 152/75   Pulse: 82   Resp: 18   Temp: 98.4 øF (36.9 øC)   SpO2: 96%     Physical Exam  HENT:      Mouth/Throat:      Lips: Pink.      Dentition: Abnormal dentition. No dental tenderness.      Pharynx: Oropharynx is clear. Uvula midline. No pharyngeal swelling, oropharyngeal exudate, posterior oropharyngeal erythema or uvula swelling.      Comments: Pt has 3 remaining teeth.  They are lower right.  There is no abscess or dental fluctuance.       GENERAL: not distressed  HENT: nares patent  EYES: no scleral icterus  NECK: no ROM limitations  CV: regular rhythm, regular rate  RESPIRATORY: normal effort  ABDOMEN: soft  : deferred  MUSCULOSKELETAL: no deformity  NEURO: alert, moves all extremities, follows commands  SKIN: warm, dry      PROGRESS, DATA ANALYSIS, CONSULTS AND MEDICAL DECISION MAKING    Please note that this section constitutes my independent interpretation of clinical data including lab results, radiology, EKG's.  This constitutes my independent professional opinion regarding differential diagnosis and management of this patient.  It may include any factors such as history from outside sources, review of external records, social determinants of health, management of medications, response to those treatments, and discussions with other providers.       Orders placed during this visit:  No orders of the defined types were placed in this encounter.           Medical Decision Making    Pt with dental pain.  She has 3 teeth remaining and is due to get these extracted next week to prepare for dentures.  There is NO dental abscess, fluctuance, neck swelling.  Patient not immunosuppressed, afebrile and well appearing with patent airway, have low suspicfion for deep space infection or any concern for airway  compromise. Based on history, physical, and work up. No evidence of tooth fracture, avulsion, or bleeding socket. No evidence of RPA, PTA, Rohith's angina, periapical abscess.  Instructed patient to continue to treat pain with ibuprofen/acetaminophen until they see a dentist. Plan to give her course of Clindamycin and motrin 800 mg.  She has dental follow-up already scheduled.     DIAGNOSIS  Final diagnoses:   Pain due to dental caries          Medication List        New Prescriptions      clindamycin 300 MG capsule  Commonly known as: CLEOCIN  Take 1 capsule by mouth 3 (Three) Times a Day.     ibuprofen 800 MG tablet  Commonly known as: ADVIL,MOTRIN  Take 1 tablet by mouth Every 8 (Eight) Hours As Needed for Mild Pain.               Where to Get Your Medications        These medications were sent to [facility]/pharmacy #[id number] - [place], [place] - [address] [place] [address] - [phone]  - [phone] [address] [place] [address]      Hours: 24-hours Phone: [phone]   clindamycin 300 MG capsule  ibuprofen 800 MG tablet         FOLLOW-UP  Keep your appointment next week with [facility]              Latest Documented Vital Signs:  As of 20:57 EDT  BP- 152/75 HR- 82 Temp- 98.4 øF (36.9 øC) (Oral) O2 sat- 96%    Appropriate PPE utilized throughout this patient encounter to include mask, if indicated, per current protocol. Hand hygiene was performed before donning PPE and after removal when leaving the room.    Please note that portions of this were completed with a voice recognition program.     Note Disclaimer: At [facility], we believe that sharing information builds trust and better relationships. You are receiving this note because you are receiving care at [facility] or recently visited. It is possible you will see health information before a provider has talked with you about it. This kind of information can be easy to misunderstand. To help you fully understand what it means for your  health, we urge you to discuss this note with your provider.

## 2024-04-03 ENCOUNTER — HOSPITAL ENCOUNTER (OUTPATIENT)
Facility: HOSPITAL | Age: 34
Discharge: HOME | End: 2024-04-03
Attending: EMERGENCY MEDICINE | Admitting: EMERGENCY MEDICINE
Payer: MEDICAID

## 2024-04-03 VITALS
WEIGHT: 293 LBS | RESPIRATION RATE: 16 BRPM | TEMPERATURE: 97.8 F | HEIGHT: 68 IN | OXYGEN SATURATION: 95 % | DIASTOLIC BLOOD PRESSURE: 80 MMHG | SYSTOLIC BLOOD PRESSURE: 152 MMHG | BODY MASS INDEX: 44.41 KG/M2 | HEART RATE: 83 BPM

## 2024-04-03 DIAGNOSIS — L02.91 ABSCESS: Primary | ICD-10-CM

## 2024-04-03 PROCEDURE — G0463 HOSPITAL OUTPT CLINIC VISIT: HCPCS | Performed by: EMERGENCY MEDICINE

## 2024-04-03 PROCEDURE — 99213 OFFICE O/P EST LOW 20 MIN: CPT | Performed by: EMERGENCY MEDICINE

## 2024-04-03 RX ORDER — SULFAMETHOXAZOLE AND TRIMETHOPRIM 800; 160 MG/1; MG/1
2 TABLET ORAL 2 TIMES DAILY
Qty: 28 TABLET | Refills: 0 | Status: SHIPPED | OUTPATIENT
Start: 2024-04-03

## 2024-04-03 RX ORDER — DICLOFENAC SODIUM 75 MG/1
75 TABLET, DELAYED RELEASE ORAL 2 TIMES DAILY
Qty: 30 TABLET | Refills: 0 | Status: SHIPPED | OUTPATIENT
Start: 2024-04-03

## 2024-04-03 NOTE — FSED PROVIDER NOTE
Subjective   History of Present Illness  Pt presents with 4 days of mild/moderate neck pain and noted some bumps she felt back there, no fall or trauma, no cough/uri/f, no systemic symptoms, denies hx of mrsa, no n/v/d/abd pain and billy po well, no prior hx of this    History provided by:  Patient   used: No        Review of Systems   Cardiovascular:  Negative for chest pain.   Gastrointestinal:  Negative for abdominal distention.   Musculoskeletal:  Positive for neck pain.   Skin:  Negative for rash.   All other systems reviewed and are negative.      Past Medical History:   Diagnosis Date    Asthma     Kidney stone        Allergies   Allergen Reactions    Penicillins Hives       Past Surgical History:   Procedure Laterality Date     SECTION      CHOLECYSTECTOMY      DILATION AND CURETTAGE, DIAGNOSTIC / THERAPEUTIC      URETERAL STENT INSERTION         No family history on file.    Social History     Socioeconomic History    Marital status: Single   Tobacco Use    Smoking status: Every Day     Current packs/day: 1.00     Types: Cigarettes    Smokeless tobacco: Never   Vaping Use    Vaping status: Never Used   Substance and Sexual Activity    Alcohol use: No    Drug use: Never    Sexual activity: Defer           Objective   Physical Exam  Vitals and nursing note reviewed.   HENT:      Head: Normocephalic.      Nose: Nose normal.      Mouth/Throat:      Mouth: Mucous membranes are moist.   Eyes:      Conjunctiva/sclera: Conjunctivae normal.   Neck:      Comments: Posterior cervical superficial abscess without drainage or cellulitis  Cardiovascular:      Rate and Rhythm: Normal rate and regular rhythm.   Pulmonary:      Effort: Pulmonary effort is normal.   Musculoskeletal:         General: Normal range of motion.      Cervical back: Normal range of motion.   Skin:     General: Skin is warm.      Capillary Refill: Capillary refill takes less than 2 seconds.   Neurological:      General: No  focal deficit present.      Mental Status: She is alert.   Psychiatric:         Mood and Affect: Mood normal.         Procedures           ED Course                                           Medical Decision Making      Final diagnoses:   Abscess       ED Disposition  ED Disposition       ED Disposition   Discharge    Condition   Stable    Comment   --               Provider, No Known  [place] [address]  [phone]    In 3 days           Medication List        New Prescriptions      diclofenac 75 MG EC tablet  Commonly known as: VOLTAREN  Take 1 tablet by mouth 2 (Two) Times a Day.     sulfamethoxazole-trimethoprim 800-160 MG per tablet  Commonly known as: BACTRIM DS,SEPTRA DS  Take 2 tablets by mouth 2 (Two) Times a Day.               Where to Get Your Medications        These medications were sent to [id number] - [place], [place] - [address] - [phone]  - [phone] FX  [address] [place] [address]      Phone: [phone]   diclofenac 75 MG EC tablet  sulfamethoxazole-trimethoprim 800-160 MG per tablet

## 2024-04-08 ENCOUNTER — HOSPITAL ENCOUNTER (OUTPATIENT)
Facility: HOSPITAL | Age: 34
Discharge: HOME | End: 2024-04-08
Attending: EMERGENCY MEDICINE
Payer: MEDICAID

## 2024-04-08 VITALS
WEIGHT: 280 LBS | OXYGEN SATURATION: 96 % | DIASTOLIC BLOOD PRESSURE: 66 MMHG | TEMPERATURE: 98 F | HEIGHT: 67 IN | HEART RATE: 90 BPM | SYSTOLIC BLOOD PRESSURE: 114 MMHG | RESPIRATION RATE: 16 BRPM | BODY MASS INDEX: 43.95 KG/M2

## 2024-04-08 DIAGNOSIS — L02.11 CELLULITIS AND ABSCESS OF NECK: Primary | ICD-10-CM

## 2024-04-08 DIAGNOSIS — L03.221 CELLULITIS AND ABSCESS OF NECK: Primary | ICD-10-CM

## 2024-04-08 PROCEDURE — G0463 HOSPITAL OUTPT CLINIC VISIT: HCPCS

## 2024-04-08 PROCEDURE — 99212 OFFICE O/P EST SF 10 MIN: CPT

## 2024-04-08 PROCEDURE — 25010000002 KETOROLAC TROMETHAMINE PER 15 MG

## 2024-04-08 RX ORDER — KETOROLAC TROMETHAMINE 30 MG/ML
30 INJECTION, SOLUTION INTRAMUSCULAR; INTRAVENOUS ONCE
Status: COMPLETED | OUTPATIENT
Start: 2024-04-08 | End: 2024-04-08

## 2024-04-08 RX ADMIN — KETOROLAC TROMETHAMINE 30 MG: 30 INJECTION, SOLUTION INTRAMUSCULAR at 18:02

## 2024-04-08 NOTE — Clinical Note
[facility] FSED [place] [id number] [place] [address] [place] [address]  Phone: [phone]    Jacqueline Turcios was seen and treated in our emergency department on 4/8/2024.  She may return to work on 04/09/2024.         Thank you for choosing [facility].    Keny Borjas MD

## 2024-04-08 NOTE — FSED PROVIDER NOTE
Subjective   History of Present Illness  33-year-old female reports that she was seen this past Friday night here at the urgent care and started on clindamycin for an abscess to her upper back lower neck region.  She reports that the area is  to touch and was concerned that she is not getting better.  She reports using a heating pad several times daily.  She denies that the area is draining.  She is not taking any pain medication including Tylenol or ibuprofen.        Review of Systems   All other systems reviewed and are negative.      Past Medical History:   Diagnosis Date    Asthma     Kidney stone        Allergies   Allergen Reactions    Penicillins Hives       Past Surgical History:   Procedure Laterality Date     SECTION      CHOLECYSTECTOMY      DILATION AND CURETTAGE, DIAGNOSTIC / THERAPEUTIC      URETERAL STENT INSERTION         History reviewed. No pertinent family history.    Social History     Socioeconomic History    Marital status: Single   Tobacco Use    Smoking status: Every Day     Current packs/day: 1.00     Types: Cigarettes    Smokeless tobacco: Never   Vaping Use    Vaping status: Never Used   Substance and Sexual Activity    Alcohol use: No    Drug use: Never    Sexual activity: Defer           Objective   Physical Exam  Constitutional:       Appearance: Normal appearance.   HENT:      Head: Normocephalic and atraumatic.      Nose: Nose normal.      Mouth/Throat:      Mouth: Mucous membranes are moist.      Pharynx: Oropharynx is clear.   Eyes:      Extraocular Movements: Extraocular movements intact.      Pupils: Pupils are equal, round, and reactive to light.   Neck:      Comments: To the base of the patient's neck midline there is a pinpoint area of redness the surrounding tissue underneath the skin feels mildly indurated there is no redness or warmth appreciated presentation of feels consistent with possibly abscess or possibly cyst.  Cardiovascular:      Rate and  Rhythm: Normal rate.      Pulses: Normal pulses.   Pulmonary:      Effort: Pulmonary effort is normal.      Breath sounds: Normal breath sounds.   Abdominal:      General: Abdomen is flat. Bowel sounds are normal.      Palpations: Abdomen is soft.   Musculoskeletal:         General: Normal range of motion.      Cervical back: Normal range of motion.   Neurological:      General: No focal deficit present.      Mental Status: She is alert and oriented to person, place, and time.         Procedures           ED Course                                           Medical Decision Making  Patient's main concern is that she has not improving as fast as she would like.  We discussed the need to continue clindamycin and finish it until completion I have also discussed with the need for warm compresses and taking Tylenol or ibuprofen every 4-6 hours.  Today I do not see central fluctuance to the area of inflammation so I do not see the need for incision and drainage at this time.  I discussed this option with the patient and she is very hesitant to undergo this here today.    Review of clinical record shows the patient is not on clindamycin brought Bactrim that she is only had 3 days.    Risk  Prescription drug management.        Final diagnoses:   Cellulitis and abscess of neck       ED Disposition  ED Disposition       ED Disposition   Discharge    Condition   Stable    Comment   --               Provider, No Known  [facility] [address] [phone]               Medication List      No changes were made to your prescriptions during this visit.

## 2024-04-08 NOTE — DISCHARGE INSTRUCTIONS
Continue Bactrim for skin infection of your neck    If the area of inflammation becomes raised and turns into an abscess that can be drained return to ER    Recommend you continue with warm compresses to your neck    Recommend that you begin taking combination of either Tylenol or ibuprofen for help with pain management    Return to ER for worsening symptoms

## 2024-04-11 ENCOUNTER — OFFICE VISIT (OUTPATIENT)
Dept: SURGERY | Facility: CLINIC | Age: 34
End: 2024-04-11
Payer: MEDICAID

## 2024-04-11 VITALS
TEMPERATURE: 98 F | SYSTOLIC BLOOD PRESSURE: 117 MMHG | WEIGHT: 293 LBS | OXYGEN SATURATION: 95 % | DIASTOLIC BLOOD PRESSURE: 68 MMHG | HEART RATE: 80 BPM | HEIGHT: 67 IN | BODY MASS INDEX: 45.99 KG/M2

## 2024-04-11 DIAGNOSIS — L72.3 SEBACEOUS CYST: Primary | ICD-10-CM

## 2024-04-11 PROCEDURE — 1160F RVW MEDS BY RX/DR IN RCRD: CPT | Performed by: SURGERY

## 2024-04-11 PROCEDURE — 99203 OFFICE O/P NEW LOW 30 MIN: CPT | Performed by: SURGERY

## 2024-04-11 PROCEDURE — 1159F MED LIST DOCD IN RCRD: CPT | Performed by: SURGERY

## 2024-04-11 NOTE — PROGRESS NOTES
CHIEF COMPLAINT:    Posterior neck pain with swelling    HISTORY OF PRESENT ILLNESS:    Jacqueline Turcios is a 33 y.o. female who was recently seen at urgent care twice for swelling and pain related to inflamed or infected cyst on the posterior neck.  She was placed on antibiotics for this with partial improvement.  She is here today to discuss more definitive treatment.  She continues to have pain in the area.  She just completed a course of oral Bactrim.  She notes no fevers or chills.  No drainage at the site.    Past Medical History:   Diagnosis Date    Asthma     Kidney stone        Past Surgical History:   Procedure Laterality Date     SECTION      CHOLECYSTECTOMY      DILATION AND CURETTAGE, DIAGNOSTIC / THERAPEUTIC      URETERAL STENT INSERTION         Prior to Admission medications    Medication Sig Start Date End Date Taking? Authorizing Provider   ibuprofen (ADVIL,MOTRIN) 800 MG tablet Take 1 tablet by mouth Every 8 (Eight) Hours As Needed for Mild Pain. 23  Yes Oralia Cotton APRN   methadone (DOLOPHINE) 10 MG tablet Take 13 tablets by mouth,   Yes ProviderGiovani MD   sulfamethoxazole-trimethoprim (BACTRIM DS,SEPTRA DS) 800-160 MG per tablet Take 2 tablets by mouth 2 (Two) Times a Day. 4/3/24  Yes Keny Borjas MD   clindamycin (CLEOCIN) 300 MG capsule Take 1 capsule by mouth 3 (Three) Times a Day. 23  Oralia Cotton APRN   diclofenac (VOLTAREN) 75 MG EC tablet Take 1 tablet by mouth 2 (Two) Times a Day. 4/3/24 4/11/24  Keny Borjas MD   prenatal vitamins (PRENATAL 27-1) 27-1 MG tablet tablet Take  by mouth Daily.  24  ProviderGiovani MD       Allergies   Allergen Reactions    Penicillins Hives     Beta lactam allergy details  Antibiotic reaction: other (throat closed)  Age at reaction: infant  Dose to reaction time: (!) hours  Reason for antibiotic: unknown  Epinephrine required for reaction?: unknown  Tolerated antibiotics: amoxicillin, cephalexin      "      History reviewed. No pertinent family history.    Social History     Socioeconomic History    Marital status: Single   Tobacco Use    Smoking status: Every Day     Current packs/day: 1.00     Types: Cigarettes     Passive exposure: Current    Smokeless tobacco: Never   Vaping Use    Vaping status: Never Used   Substance and Sexual Activity    Alcohol use: No    Drug use: Never    Sexual activity: Defer       Review of Systems   Skin:         Inflamed cyst on neck       Objective     /68 (BP Location: Left arm, Patient Position: Sitting, Cuff Size: Large Adult)   Pulse 80   Temp 98 °F (36.7 °C) (Infrared)   Ht 170.2 cm (67\")   Wt (!) 141 kg (311 lb)   LMP 03/25/2024 (Approximate)   SpO2 95%   BMI 48.71 kg/m²     Physical Exam  Constitutional:       General: She is not in acute distress.     Appearance: Normal appearance. She is obese. She is not ill-appearing, toxic-appearing or diaphoretic.   HENT:      Head: Normocephalic and atraumatic.   Eyes:      General:         Right eye: No discharge.         Left eye: No discharge.      Extraocular Movements: Extraocular movements intact.      Conjunctiva/sclera: Conjunctivae normal.   Neck:     Pulmonary:      Effort: Pulmonary effort is normal. No respiratory distress.   Skin:     General: Skin is warm.      Coloration: Skin is not jaundiced.   Neurological:      General: No focal deficit present.      Mental Status: She is alert and oriented to person, place, and time.   Psychiatric:         Mood and Affect: Mood normal.         Behavior: Behavior normal.         Thought Content: Thought content normal.         Judgment: Judgment normal.         DIAGNOSTIC DATA:    Urgent care notes reviewed describing inflamed area on posterior neck    ASSESSMENT:    Inflamed cyst on posterior neck    PLAN:    On exam the patient appears to have an inflamed cyst on the posterior neck without active erythema or fluctuance currently.  She continues to have significant " pain in the area.  Given the size of the area as well as her significant pain I do not believe that this can adequately be addressed under local anesthetic in the office.  Therefore I have scheduled her for excision of the site in the operating room.  The risks and benefits of excision of cyst from posterior neck were discussed with her.  She has been scheduled.          This document has been electronically signed by Raul Lawson MD on April 11, 2024 10:57 EDT

## 2024-04-12 ENCOUNTER — LAB (OUTPATIENT)
Dept: LAB | Facility: HOSPITAL | Age: 34
End: 2024-04-12
Payer: MEDICAID

## 2024-04-12 ENCOUNTER — HOSPITAL ENCOUNTER (OUTPATIENT)
Dept: CARDIOLOGY | Facility: HOSPITAL | Age: 34
Discharge: HOME | End: 2024-04-12
Payer: MEDICAID

## 2024-04-12 LAB
ANION GAP SERPL CALCULATED.3IONS-SCNC: 6.9 MMOL/L (ref 5–15)
BASOPHILS # BLD AUTO: 0.01 10*3/MM3 (ref 0–0.2)
BASOPHILS NFR BLD AUTO: 0.2 % (ref 0–1.5)
BUN SERPL-MCNC: 9 MG/DL (ref 6–20)
BUN/CREAT SERPL: 14.5 (ref 7–25)
CALCIUM SPEC-SCNC: 9.2 MG/DL (ref 8.6–10.5)
CHLORIDE SERPL-SCNC: 100 MMOL/L (ref 98–107)
CO2 SERPL-SCNC: 29.1 MMOL/L (ref 22–29)
CREAT SERPL-MCNC: 0.62 MG/DL (ref 0.57–1)
DEPRECATED RDW RBC AUTO: 40.9 FL (ref 37–54)
EGFRCR SERPLBLD CKD-EPI 2021: 120.8 ML/MIN/1.73
EOSINOPHIL # BLD AUTO: 0.07 10*3/MM3 (ref 0–0.4)
EOSINOPHIL NFR BLD AUTO: 1.2 % (ref 0.3–6.2)
ERYTHROCYTE [DISTWIDTH] IN BLOOD BY AUTOMATED COUNT: 13.8 % (ref 12.3–15.4)
GLUCOSE SERPL-MCNC: 117 MG/DL (ref 65–99)
HCT VFR BLD AUTO: 37.2 % (ref 34–46.6)
HGB BLD-MCNC: 11.6 G/DL (ref 12–15.9)
IMM GRANULOCYTES # BLD AUTO: 0.01 10*3/MM3 (ref 0–0.05)
IMM GRANULOCYTES NFR BLD AUTO: 0.2 % (ref 0–0.5)
LYMPHOCYTES # BLD AUTO: 1.85 10*3/MM3 (ref 0.7–3.1)
LYMPHOCYTES NFR BLD AUTO: 32.5 % (ref 19.6–45.3)
MCH RBC QN AUTO: 25.8 PG (ref 26.6–33)
MCHC RBC AUTO-ENTMCNC: 31.2 G/DL (ref 31.5–35.7)
MCV RBC AUTO: 82.7 FL (ref 79–97)
MONOCYTES # BLD AUTO: 0.46 10*3/MM3 (ref 0.1–0.9)
MONOCYTES NFR BLD AUTO: 8.1 % (ref 5–12)
NEUTROPHILS NFR BLD AUTO: 3.29 10*3/MM3 (ref 1.7–7)
NEUTROPHILS NFR BLD AUTO: 57.8 % (ref 42.7–76)
NRBC BLD AUTO-RTO: 0 /100 WBC (ref 0–0.2)
PLATELET # BLD AUTO: 254 10*3/MM3 (ref 140–450)
PMV BLD AUTO: 10.9 FL (ref 6–12)
POTASSIUM SERPL-SCNC: 4.4 MMOL/L (ref 3.5–5.2)
RBC # BLD AUTO: 4.5 10*6/MM3 (ref 3.77–5.28)
SODIUM SERPL-SCNC: 136 MMOL/L (ref 136–145)
WBC NRBC COR # BLD AUTO: 5.69 10*3/MM3 (ref 3.4–10.8)

## 2024-04-12 PROCEDURE — 85025 COMPLETE CBC W/AUTO DIFF WBC: CPT | Performed by: SURGERY

## 2024-04-12 PROCEDURE — 93005 ELECTROCARDIOGRAM TRACING: CPT | Performed by: SURGERY

## 2024-04-12 PROCEDURE — 80048 BASIC METABOLIC PNL TOTAL CA: CPT | Performed by: SURGERY

## 2024-04-12 PROCEDURE — 36415 COLL VENOUS BLD VENIPUNCTURE: CPT | Performed by: SURGERY

## 2024-04-13 LAB
QT INTERVAL: 373 MS
QTC INTERVAL: 427 MS

## 2024-04-16 ENCOUNTER — ANESTHESIA EVENT (OUTPATIENT)
Dept: PERIOP | Facility: HOSPITAL | Age: 34
End: 2024-04-16
Payer: MEDICAID

## 2024-04-16 ENCOUNTER — HOSPITAL ENCOUNTER (OUTPATIENT)
Facility: HOSPITAL | Age: 34
Setting detail: HOSPITAL OUTPATIENT SURGERY
Discharge: HOME | End: 2024-04-16
Attending: SURGERY | Admitting: SURGERY
Payer: MEDICAID

## 2024-04-16 ENCOUNTER — ANESTHESIA (OUTPATIENT)
Dept: PERIOP | Facility: HOSPITAL | Age: 34
End: 2024-04-16
Payer: MEDICAID

## 2024-04-16 VITALS
SYSTOLIC BLOOD PRESSURE: 96 MMHG | RESPIRATION RATE: 15 BRPM | TEMPERATURE: 97.5 F | HEART RATE: 77 BPM | BODY MASS INDEX: 47.93 KG/M2 | OXYGEN SATURATION: 93 % | DIASTOLIC BLOOD PRESSURE: 60 MMHG | WEIGHT: 293 LBS

## 2024-04-16 DIAGNOSIS — L72.3 SEBACEOUS CYST: ICD-10-CM

## 2024-04-16 LAB — B-HCG UR QL: NEGATIVE

## 2024-04-16 PROCEDURE — 81025 URINE PREGNANCY TEST: CPT | Performed by: SURGERY

## 2024-04-16 PROCEDURE — 25010000002 SUGAMMADEX 200 MG/2ML SOLUTION: Performed by: NURSE ANESTHETIST, CERTIFIED REGISTERED

## 2024-04-16 PROCEDURE — 25010000002 BUPIVACAINE 0.25 % SOLUTION: Performed by: SURGERY

## 2024-04-16 PROCEDURE — 25010000002 FENTANYL CITRATE (PF) 50 MCG/ML SOLUTION: Performed by: NURSE ANESTHETIST, CERTIFIED REGISTERED

## 2024-04-16 PROCEDURE — 25010000002 SUCCINYLCHOLINE PER 20 MG: Performed by: NURSE ANESTHETIST, CERTIFIED REGISTERED

## 2024-04-16 PROCEDURE — 25010000002 ONDANSETRON PER 1 MG: Performed by: NURSE ANESTHETIST, CERTIFIED REGISTERED

## 2024-04-16 PROCEDURE — 87075 CULTR BACTERIA EXCEPT BLOOD: CPT | Performed by: SURGERY

## 2024-04-16 PROCEDURE — 87070 CULTURE OTHR SPECIMN AEROBIC: CPT | Performed by: SURGERY

## 2024-04-16 PROCEDURE — 25010000002 HYDROMORPHONE 1 MG/ML SOLUTION: Performed by: NURSE ANESTHETIST, CERTIFIED REGISTERED

## 2024-04-16 PROCEDURE — 25010000002 FENTANYL CITRATE (PF) 100 MCG/2ML SOLUTION: Performed by: NURSE ANESTHETIST, CERTIFIED REGISTERED

## 2024-04-16 PROCEDURE — 87205 SMEAR GRAM STAIN: CPT | Performed by: SURGERY

## 2024-04-16 PROCEDURE — 87176 TISSUE HOMOGENIZATION CULTR: CPT | Performed by: SURGERY

## 2024-04-16 PROCEDURE — 88304 TISSUE EXAM BY PATHOLOGIST: CPT | Performed by: SURGERY

## 2024-04-16 PROCEDURE — 25010000002 CEFAZOLIN 3 G RECONSTITUTED SOLUTION 1 EACH VIAL: Performed by: SURGERY

## 2024-04-16 PROCEDURE — 87015 SPECIMEN INFECT AGNT CONCNTJ: CPT | Performed by: SURGERY

## 2024-04-16 PROCEDURE — 25010000002 DEXAMETHASONE SODIUM PHOSPHATE 10 MG/ML SOLUTION: Performed by: NURSE ANESTHETIST, CERTIFIED REGISTERED

## 2024-04-16 PROCEDURE — 25810000003 LACTATED RINGERS PER 1000 ML: Performed by: SURGERY

## 2024-04-16 PROCEDURE — 25010000002 MIDAZOLAM PER 1 MG: Performed by: NURSE ANESTHETIST, CERTIFIED REGISTERED

## 2024-04-16 PROCEDURE — 25010000002 PROPOFOL 1000 MG/100ML EMULSION: Performed by: NURSE ANESTHETIST, CERTIFIED REGISTERED

## 2024-04-16 PROCEDURE — 11426 EXC H-F-NK-SP B9+MARG >4 CM: CPT | Performed by: SURGERY

## 2024-04-16 RX ORDER — FLUMAZENIL 0.1 MG/ML
0.2 INJECTION INTRAVENOUS AS NEEDED
Status: DISCONTINUED | OUTPATIENT
Start: 2024-04-16 | End: 2024-04-16 | Stop reason: HOSPADM

## 2024-04-16 RX ORDER — SODIUM CHLORIDE, SODIUM LACTATE, POTASSIUM CHLORIDE, CALCIUM CHLORIDE 600; 310; 30; 20 MG/100ML; MG/100ML; MG/100ML; MG/100ML
1000 INJECTION, SOLUTION INTRAVENOUS CONTINUOUS
Status: DISCONTINUED | OUTPATIENT
Start: 2024-04-16 | End: 2024-04-16 | Stop reason: HOSPADM

## 2024-04-16 RX ORDER — SUCCINYLCHOLINE CHLORIDE 20 MG/ML
INJECTION INTRAMUSCULAR; INTRAVENOUS AS NEEDED
Status: DISCONTINUED | OUTPATIENT
Start: 2024-04-16 | End: 2024-04-16 | Stop reason: SURG

## 2024-04-16 RX ORDER — NALOXONE HCL 0.4 MG/ML
0.2 VIAL (ML) INJECTION AS NEEDED
Status: DISCONTINUED | OUTPATIENT
Start: 2024-04-16 | End: 2024-04-16 | Stop reason: HOSPADM

## 2024-04-16 RX ORDER — PROPOFOL 10 MG/ML
INJECTION, EMULSION INTRAVENOUS AS NEEDED
Status: DISCONTINUED | OUTPATIENT
Start: 2024-04-16 | End: 2024-04-16 | Stop reason: SURG

## 2024-04-16 RX ORDER — BUPIVACAINE HYDROCHLORIDE 2.5 MG/ML
INJECTION, SOLUTION INFILTRATION; PERINEURAL AS NEEDED
Status: DISCONTINUED | OUTPATIENT
Start: 2024-04-16 | End: 2024-04-16 | Stop reason: HOSPADM

## 2024-04-16 RX ORDER — IPRATROPIUM BROMIDE AND ALBUTEROL SULFATE 2.5; .5 MG/3ML; MG/3ML
3 SOLUTION RESPIRATORY (INHALATION) ONCE AS NEEDED
Status: DISCONTINUED | OUTPATIENT
Start: 2024-04-16 | End: 2024-04-16 | Stop reason: HOSPADM

## 2024-04-16 RX ORDER — PROMETHAZINE HYDROCHLORIDE 25 MG/1
25 TABLET ORAL ONCE AS NEEDED
Status: DISCONTINUED | OUTPATIENT
Start: 2024-04-16 | End: 2024-04-16 | Stop reason: HOSPADM

## 2024-04-16 RX ORDER — EPHEDRINE SULFATE 5 MG/ML
INJECTION INTRAVENOUS AS NEEDED
Status: DISCONTINUED | OUTPATIENT
Start: 2024-04-16 | End: 2024-04-16 | Stop reason: SURG

## 2024-04-16 RX ORDER — ROCURONIUM BROMIDE 10 MG/ML
INJECTION, SOLUTION INTRAVENOUS AS NEEDED
Status: DISCONTINUED | OUTPATIENT
Start: 2024-04-16 | End: 2024-04-16 | Stop reason: SURG

## 2024-04-16 RX ORDER — PROMETHAZINE HYDROCHLORIDE 25 MG/1
25 SUPPOSITORY RECTAL ONCE AS NEEDED
Status: DISCONTINUED | OUTPATIENT
Start: 2024-04-16 | End: 2024-04-16 | Stop reason: HOSPADM

## 2024-04-16 RX ORDER — LABETALOL HYDROCHLORIDE 5 MG/ML
5 INJECTION, SOLUTION INTRAVENOUS
Status: DISCONTINUED | OUTPATIENT
Start: 2024-04-16 | End: 2024-04-16 | Stop reason: HOSPADM

## 2024-04-16 RX ORDER — DROPERIDOL 2.5 MG/ML
0.62 INJECTION, SOLUTION INTRAMUSCULAR; INTRAVENOUS
Status: DISCONTINUED | OUTPATIENT
Start: 2024-04-16 | End: 2024-04-16 | Stop reason: HOSPADM

## 2024-04-16 RX ORDER — HYDROCODONE BITARTRATE AND ACETAMINOPHEN 5; 325 MG/1; MG/1
1 TABLET ORAL EVERY 6 HOURS PRN
Qty: 10 TABLET | Refills: 0 | Status: SHIPPED | OUTPATIENT
Start: 2024-04-16

## 2024-04-16 RX ORDER — FENTANYL CITRATE 50 UG/ML
INJECTION, SOLUTION INTRAMUSCULAR; INTRAVENOUS AS NEEDED
Status: DISCONTINUED | OUTPATIENT
Start: 2024-04-16 | End: 2024-04-16 | Stop reason: SURG

## 2024-04-16 RX ORDER — BACITRACIN ZINC 500 [USP'U]/G
OINTMENT TOPICAL AS NEEDED
Status: DISCONTINUED | OUTPATIENT
Start: 2024-04-16 | End: 2024-04-16 | Stop reason: HOSPADM

## 2024-04-16 RX ORDER — HYDRALAZINE HYDROCHLORIDE 20 MG/ML
5 INJECTION INTRAMUSCULAR; INTRAVENOUS
Status: DISCONTINUED | OUTPATIENT
Start: 2024-04-16 | End: 2024-04-16 | Stop reason: HOSPADM

## 2024-04-16 RX ORDER — CLINDAMYCIN PHOSPHATE 900 MG/50ML
900 INJECTION, SOLUTION INTRAVENOUS ONCE
Status: DISCONTINUED | OUTPATIENT
Start: 2024-04-16 | End: 2024-04-16

## 2024-04-16 RX ORDER — HYDROCODONE BITARTRATE AND ACETAMINOPHEN 5; 325 MG/1; MG/1
1 TABLET ORAL ONCE AS NEEDED
Status: COMPLETED | OUTPATIENT
Start: 2024-04-16 | End: 2024-04-16

## 2024-04-16 RX ORDER — DEXAMETHASONE SODIUM PHOSPHATE 10 MG/ML
INJECTION, SOLUTION INTRAMUSCULAR; INTRAVENOUS AS NEEDED
Status: DISCONTINUED | OUTPATIENT
Start: 2024-04-16 | End: 2024-04-16 | Stop reason: SURG

## 2024-04-16 RX ORDER — MIDAZOLAM HYDROCHLORIDE 1 MG/ML
INJECTION INTRAMUSCULAR; INTRAVENOUS AS NEEDED
Status: DISCONTINUED | OUTPATIENT
Start: 2024-04-16 | End: 2024-04-16 | Stop reason: SURG

## 2024-04-16 RX ORDER — LIDOCAINE HYDROCHLORIDE 10 MG/ML
0.5 INJECTION, SOLUTION INFILTRATION; PERINEURAL ONCE AS NEEDED
Status: DISCONTINUED | OUTPATIENT
Start: 2024-04-16 | End: 2024-04-16 | Stop reason: HOSPADM

## 2024-04-16 RX ORDER — ONDANSETRON 2 MG/ML
INJECTION INTRAMUSCULAR; INTRAVENOUS AS NEEDED
Status: DISCONTINUED | OUTPATIENT
Start: 2024-04-16 | End: 2024-04-16 | Stop reason: SURG

## 2024-04-16 RX ORDER — ONDANSETRON 2 MG/ML
4 INJECTION INTRAMUSCULAR; INTRAVENOUS ONCE AS NEEDED
Status: DISCONTINUED | OUTPATIENT
Start: 2024-04-16 | End: 2024-04-16 | Stop reason: HOSPADM

## 2024-04-16 RX ORDER — NALOXONE HYDROCHLORIDE 4 MG/.1ML
SPRAY NASAL
Qty: 2 EACH | Refills: 0 | Status: SHIPPED | OUTPATIENT
Start: 2024-04-16

## 2024-04-16 RX ORDER — SODIUM CHLORIDE 0.9 % (FLUSH) 0.9 %
10 SYRINGE (ML) INJECTION AS NEEDED
Status: DISCONTINUED | OUTPATIENT
Start: 2024-04-16 | End: 2024-04-16 | Stop reason: HOSPADM

## 2024-04-16 RX ORDER — LIDOCAINE HYDROCHLORIDE 20 MG/ML
INJECTION, SOLUTION EPIDURAL; INFILTRATION; INTRACAUDAL; PERINEURAL AS NEEDED
Status: DISCONTINUED | OUTPATIENT
Start: 2024-04-16 | End: 2024-04-16 | Stop reason: SURG

## 2024-04-16 RX ORDER — FENTANYL CITRATE 50 UG/ML
50 INJECTION, SOLUTION INTRAMUSCULAR; INTRAVENOUS
Status: DISCONTINUED | OUTPATIENT
Start: 2024-04-16 | End: 2024-04-16 | Stop reason: HOSPADM

## 2024-04-16 RX ORDER — DIPHENHYDRAMINE HYDROCHLORIDE 50 MG/ML
12.5 INJECTION INTRAMUSCULAR; INTRAVENOUS
Status: DISCONTINUED | OUTPATIENT
Start: 2024-04-16 | End: 2024-04-16 | Stop reason: HOSPADM

## 2024-04-16 RX ADMIN — ONDANSETRON 4 MG: 2 INJECTION INTRAMUSCULAR; INTRAVENOUS at 14:13

## 2024-04-16 RX ADMIN — LIDOCAINE HYDROCHLORIDE 100 MG: 20 INJECTION, SOLUTION EPIDURAL; INFILTRATION; INTRACAUDAL; PERINEURAL at 14:06

## 2024-04-16 RX ADMIN — MIDAZOLAM HYDROCHLORIDE 2 MG: 2 INJECTION, SOLUTION INTRAMUSCULAR; INTRAVENOUS at 14:01

## 2024-04-16 RX ADMIN — SUCCINYLCHOLINE CHLORIDE 120 MG: 20 INJECTION, SOLUTION INTRAMUSCULAR; INTRAVENOUS at 14:06

## 2024-04-16 RX ADMIN — ROCURONIUM BROMIDE 50 MG: 10 INJECTION, SOLUTION INTRAVENOUS at 14:13

## 2024-04-16 RX ADMIN — SODIUM CHLORIDE 3 G: 900 INJECTION INTRAVENOUS at 14:11

## 2024-04-16 RX ADMIN — SODIUM CHLORIDE, POTASSIUM CHLORIDE, SODIUM LACTATE AND CALCIUM CHLORIDE 1000 ML: 600; 310; 30; 20 INJECTION, SOLUTION INTRAVENOUS at 12:01

## 2024-04-16 RX ADMIN — FENTANYL CITRATE 50 MCG: 50 INJECTION, SOLUTION INTRAMUSCULAR; INTRAVENOUS at 15:25

## 2024-04-16 RX ADMIN — HYDROCODONE BITARTRATE AND ACETAMINOPHEN 1 TABLET: 5; 325 TABLET ORAL at 15:55

## 2024-04-16 RX ADMIN — DEXAMETHASONE SODIUM PHOSPHATE 8 MG: 10 INJECTION, SOLUTION INTRAMUSCULAR; INTRAVENOUS at 14:13

## 2024-04-16 RX ADMIN — FENTANYL CITRATE 100 MCG: 50 INJECTION, SOLUTION INTRAMUSCULAR; INTRAVENOUS at 14:06

## 2024-04-16 RX ADMIN — SUGAMMADEX 200 MG: 100 INJECTION, SOLUTION INTRAVENOUS at 14:41

## 2024-04-16 RX ADMIN — EPHEDRINE SULFATE 10 MG: 5 INJECTION INTRAVENOUS at 14:31

## 2024-04-16 RX ADMIN — PROPOFOL INJECTABLE EMULSION 200 MG: 10 INJECTION, EMULSION INTRAVENOUS at 14:06

## 2024-04-16 RX ADMIN — HYDROMORPHONE HYDROCHLORIDE 0.5 MG: 1 INJECTION, SOLUTION INTRAMUSCULAR; INTRAVENOUS; SUBCUTANEOUS at 15:46

## 2024-04-16 NOTE — ANESTHESIA PREPROCEDURE EVALUATION
Anesthesia Evaluation     NPO Solid Status: > 8 hours  NPO Liquid Status: > 8 hours           Airway   Mallampati: II  TM distance: >3 FB  Neck ROM: full  No difficulty expected  Dental - normal exam     Pulmonary - normal exam   (+) asthma,  Cardiovascular - normal exam        Neuro/Psych  GI/Hepatic/Renal/Endo    (+) morbid obesity, renal disease- stones    Musculoskeletal     Abdominal  - normal exam    Bowel sounds: normal.   Substance History      OB/GYN          Other                    Anesthesia Plan    ASA 3     general     intravenous induction     Anesthetic plan, risks, benefits, and alternatives have been provided, discussed and informed consent has been obtained with: patient.  Pre-procedure education provided  Plan discussed with CRNA.    CODE STATUS:

## 2024-04-16 NOTE — ANESTHESIA PROCEDURE NOTES
Airway  Urgency: elective    Date/Time: 4/16/2024 2:07 PM    General Information and Staff    Patient location during procedure: OR  CRNA/CAA: Hope Bills CRNA    Indications and Patient Condition  Indications for airway management: airway protection    Preoxygenated: yes  Mask difficulty assessment: 0 - not attempted    Final Airway Details  Final airway type: endotracheal airway      Successful airway: ETT  Cuffed: yes   Successful intubation technique: video laryngoscopy  Facilitating devices/methods: intubating stylet  Endotracheal tube insertion site: oral  Blade: Paredes  Blade size: 3  ETT size (mm): 7.0  Cormack-Lehane Classification: grade I - full view of glottis  Placement verified by: chest auscultation and capnometry   Cuff volume (mL): 8  Measured from: lips  ETT/EBT  to lips (cm): 19  Number of attempts at approach: 1    Additional Comments  Atraumatic placement of ETT, dentition unchanged from preop.

## 2024-04-16 NOTE — ANESTHESIA POSTPROCEDURE EVALUATION
Patient: Jacqueline Turcios    Procedure Summary       Date: 04/16/24 Room / Location: [facility] OR  / [facility] MAIN OR    Anesthesia Start: 1401 Anesthesia Stop: 1447    Procedure: EXCISION of cyst from psterior neck, prone position Diagnosis:       Sebaceous cyst      (Sebaceous cyst [L72.3])    Surgeons: Raul Lawson MD Provider: Keron Decker MD    Anesthesia Type: general ASA Status: 3            Anesthesia Type: general    Vitals  Vitals Value Taken Time   /63 04/16/24 1553   Temp 96.9 °F (36.1 °C) 04/16/24 1450   Pulse 101 04/16/24 1555   Resp 14 04/16/24 1550   SpO2 93 % 04/16/24 1555   Vitals shown include unfiled device data.        Post Anesthesia Care and Evaluation    Patient location during evaluation: PACU  Patient participation: complete - patient participated  Level of consciousness: awake  Pain scale: See nurse's notes for pain score.  Pain management: adequate    Airway patency: patent  Anesthetic complications: No anesthetic complications  PONV Status: none  Cardiovascular status: acceptable  Respiratory status: acceptable and spontaneous ventilation  Hydration status: acceptable    Comments: Patient seen and examined postoperatively; vital signs stable; SpO2 greater than or equal to 90%; cardiopulmonary status stable; nausea/vomiting adequately controlled; pain adequately controlled; no apparent anesthesia complications; patient discharged from anesthesia care when discharge criteria were met

## 2024-04-16 NOTE — OP NOTE
Operative Note    Jacqueline Turcios  4/16/2024    Pre-op Diagnosis:   Sebaceous cyst [L72.3]    Post-op Diagnosis:     Post-Op Diagnosis Codes:     * Sebaceous cyst [L72.3], posterior neck, infected    Procedure/CPT® Codes:      Procedure(s):  EXCISION of inflamed and infected cyst from psterior neck, area of excision 5cm x 3cm    Surgeon(s):  Raul Lawson MD    Anesthesia: General    Staff:   Circulator: Nasrin Persaud RN  Scrub Person: Aditi Gaming  Assistant: Amarilys Alejandro RN CSA    Estimated Blood Loss: minimal    Specimens:                ID Type Source Tests Collected by Time   1 : posterior neck cyst fluid Tissue Neck ANAEROBIC CULTURE, TISSUE / BONE CULTURE Raul Lawson MD 4/16/2024 1431   A : posterior neck cyst Tissue Neck TISSUE PATHOLOGY EXAM Raul Lawson MD 4/16/2024 1437         Drains: * No LDAs found *    Findings: Inflamed and infected sebaceous cyst of posterior neck    Complications: None noted    Indication: Inflamed and infected sebaceous cyst of posterior neck    Operative Note:    The patient was seen, marked, and consented in the preoperative area.  Following this she was brought to the operating room and general anesthetic was administered and she was intubated without issue.  She was then placed in the prone position on the OR table and appropriately padded and secured.  A briefing was performed.  The area was exposed, prepped and draped in normal sterile fashion.  A timeout was then performed.    Following timeout the area of the recently inflamed and infected cyst on the midline posterior neck was reidentified.  A fusiform incision was marked surrounding the area.  Local anesthetic was injected.  Incision was made with a scalpel and carried through subcutaneous tissues with electrocautery.  In doing so we did encounter the inflamed cyst wall near the posterior aspect of the cyst.  There was purulent fluid as well as sebaceous cyst contents exuded  from the area.  A portion of the purulent fluid was collected for Gram stain and culture.  Electrocautery was then used to excise the entirety of the cyst wall as well as the overlying skin.  This was passed off the field specimen.  Points of bleeding were controlled with electrocautery.  The wound was then copiously irrigated.  Given the size of the wound I did not feel inclined to leave the wound open particularly on the back of her neck and therefore I loosely reapproximated the skin using 3-0 Vicryl in the subcutaneous layer followed by 3-0 nylon.  Bacitracin and sterile dressing were then applied.  The patient was then returned to the supine position and awakened.    Assistant: Amarilys Alejandro RN CSA was responsible for performing the following activities: Retraction, Suction, Irrigation, and Placing Dressing and their skilled assistance was necessary for the success of this case.            This document has been electronically signed by Raul Lawson MD on April 16, 2024 15:00 KATINAT      Raul Lawson MD     Date: 4/16/2024  Time: 14:57 EDT         0

## 2024-04-16 NOTE — INTERVAL H&P NOTE
H&P reviewed. The patient was examined and there are no changes to the H&P.          This document has been electronically signed by Raul Lawson MD on April 16, 2024 13:40 EDT

## 2024-04-17 ENCOUNTER — TELEPHONE (OUTPATIENT)
Dept: SURGERY | Facility: CLINIC | Age: 34
End: 2024-04-17
Payer: MEDICAID

## 2024-04-17 NOTE — TELEPHONE ENCOUNTER
PO FU Call- spoke with pt. marco antonio 2 wk po appt. Will fu then. Knows to call with any questions or concerns.      States doing well.

## 2024-04-18 LAB
LAB AP CASE REPORT: NORMAL
PATH REPORT.FINAL DX SPEC: NORMAL
PATH REPORT.GROSS SPEC: NORMAL

## 2024-04-19 LAB
BACTERIA SPEC AEROBE CULT: NORMAL
GRAM STN SPEC: NORMAL
GRAM STN SPEC: NORMAL

## 2024-04-24 ENCOUNTER — TELEPHONE (OUTPATIENT)
Dept: SURGERY | Facility: CLINIC | Age: 34
End: 2024-04-24
Payer: MEDICAID

## 2024-04-24 NOTE — TELEPHONE ENCOUNTER
[facility] to  check on patient at the request of Dr. Lawson, he is willing to work her into clinic if she wants to be seen.

## 2024-04-25 ENCOUNTER — TELEPHONE (OUTPATIENT)
Dept: SURGERY | Facility: CLINIC | Age: 34
End: 2024-04-25
Payer: MEDICAID

## 2024-04-26 ENCOUNTER — OFFICE VISIT (OUTPATIENT)
Dept: SURGERY | Facility: CLINIC | Age: 34
End: 2024-04-26
Payer: MEDICAID

## 2024-04-26 VITALS
DIASTOLIC BLOOD PRESSURE: 68 MMHG | TEMPERATURE: 98.2 F | WEIGHT: 293 LBS | OXYGEN SATURATION: 96 % | SYSTOLIC BLOOD PRESSURE: 109 MMHG | HEART RATE: 77 BPM | BODY MASS INDEX: 45.99 KG/M2 | HEIGHT: 67 IN

## 2024-04-26 DIAGNOSIS — Z09 ENCOUNTER FOR FOLLOW-UP: Primary | ICD-10-CM

## 2024-04-26 PROCEDURE — 1159F MED LIST DOCD IN RCRD: CPT | Performed by: SURGERY

## 2024-04-26 PROCEDURE — 99024 POSTOP FOLLOW-UP VISIT: CPT | Performed by: SURGERY

## 2024-04-26 PROCEDURE — 1160F RVW MEDS BY RX/DR IN RCRD: CPT | Performed by: SURGERY

## 2024-04-26 NOTE — PROGRESS NOTES
CHIEF COMPLAINT:    Chief Complaint   Patient presents with    Post-op Follow-up     Post op 2 week follow up excision cyst posterior neck 4-16-24       HISTORY OF PRESENT ILLNESS:    Jacqueline Turcios is a 33 y.o. female who underwent excision of an inflamed and infected cyst on the posterior neck on 4/16/2024.  She returns today for follow-up.  She has previously called the office complaining of drainage at the site.  She states that after that initial phone call she had no further drainage.  She has some itching in the area.  Some skin irritation from bandaging the area.  Her pathology was benign and was discussed with her today.    EXAM:  Vitals:    04/26/24 1040   BP: 109/68   Pulse: 77   Temp: 98.2 °F (36.8 °C)   SpO2: 96%         Healing posterior neck incision, sutures removed    ASSESSMENT:    Status post excision of inflamed and infected cyst on posterior neck    PLAN:    Overall appears to be well-healed.  Can see me as needed.          This document has been electronically signed by Raul Lawson MD on April 26, 2024 10:56 EDT

## 2024-05-28 ENCOUNTER — TELEPHONE (OUTPATIENT)
Dept: SURGERY | Facility: CLINIC | Age: 34
End: 2024-05-28
Payer: MEDICAID

## 2024-05-28 NOTE — TELEPHONE ENCOUNTER
Patient left message on clarus that her cyst excision site is draining. I called to book appt but LMTRC.

## 2024-05-30 ENCOUNTER — OFFICE VISIT (OUTPATIENT)
Dept: SURGERY | Facility: CLINIC | Age: 34
End: 2024-05-30
Payer: MEDICAID

## 2024-05-30 VITALS
OXYGEN SATURATION: 96 % | TEMPERATURE: 98.4 F | DIASTOLIC BLOOD PRESSURE: 63 MMHG | WEIGHT: 293 LBS | HEIGHT: 67 IN | HEART RATE: 79 BPM | SYSTOLIC BLOOD PRESSURE: 112 MMHG | BODY MASS INDEX: 45.99 KG/M2

## 2024-05-30 DIAGNOSIS — Z09 ENCOUNTER FOR FOLLOW-UP: Primary | ICD-10-CM

## 2024-05-30 PROCEDURE — 99024 POSTOP FOLLOW-UP VISIT: CPT | Performed by: SURGERY

## 2024-05-30 NOTE — PROGRESS NOTES
CHIEF COMPLAINT:    Chief Complaint   Patient presents with    Post-op Follow-up     Follow up neck cyst excision draining 4-16-24       HISTORY OF PRESENT ILLNESS:    Jacqueline Turcios is a 33 y.o. female who underwent excision of inflamed cyst on the posterior neck previously.  Her incision has subsequently partially opened.  She notes no substantial pain or drainage at the site.    EXAM:  Vitals:    05/30/24 1005   BP: 112/63   Pulse: 79   Temp: 98.4 °F (36.9 °C)   SpO2: 96%         Open granulating area on posterior neck    ASSESSMENT:    Status post excision of inflamed/infected cyst on posterior neck    PLAN:    She was instructed on local wound care with soap and water and peroxide at least once daily.  I will plan to see her back in 3 weeks to recheck the site.          This document has been electronically signed by Raul Lawson MD on May 30, 2024 10:15 EDT       Unknown if ever smoked

## 2024-05-30 NOTE — LETTER
May 30, 2024     Patient: Jacqueline Turcios   YOB: 1990   Date of Visit: 5/30/2024       To Whom It May Concern:    It is my medical opinion that Jacqueline Turcios may return to full duty immediately with no restrictions.           Sincerely,        Raul Lawson MD    CC: No Recipients

## 2024-06-08 ENCOUNTER — HOSPITAL ENCOUNTER (EMERGENCY)
Facility: HOSPITAL | Age: 34
Discharge: HOME | End: 2024-06-08
Attending: EMERGENCY MEDICINE | Admitting: EMERGENCY MEDICINE
Payer: MEDICAID

## 2024-06-08 VITALS
OXYGEN SATURATION: 98 % | HEART RATE: 88 BPM | RESPIRATION RATE: 20 BRPM | HEIGHT: 67 IN | WEIGHT: 293 LBS | DIASTOLIC BLOOD PRESSURE: 86 MMHG | SYSTOLIC BLOOD PRESSURE: 128 MMHG | TEMPERATURE: 98 F | BODY MASS INDEX: 45.99 KG/M2

## 2024-06-08 DIAGNOSIS — G44.209 ACUTE NON INTRACTABLE TENSION-TYPE HEADACHE: Primary | ICD-10-CM

## 2024-06-08 PROCEDURE — 96361 HYDRATE IV INFUSION ADD-ON: CPT

## 2024-06-08 PROCEDURE — 96374 THER/PROPH/DIAG INJ IV PUSH: CPT

## 2024-06-08 PROCEDURE — 25010000002 KETOROLAC TROMETHAMINE PER 15 MG: Performed by: EMERGENCY MEDICINE

## 2024-06-08 PROCEDURE — 96375 TX/PRO/DX INJ NEW DRUG ADDON: CPT

## 2024-06-08 PROCEDURE — 99283 EMERGENCY DEPT VISIT LOW MDM: CPT

## 2024-06-08 PROCEDURE — 25010000002 METOCLOPRAMIDE PER 10 MG: Performed by: EMERGENCY MEDICINE

## 2024-06-08 PROCEDURE — 99283 EMERGENCY DEPT VISIT LOW MDM: CPT | Performed by: EMERGENCY MEDICINE

## 2024-06-08 PROCEDURE — 25010000002 DIPHENHYDRAMINE PER 50 MG: Performed by: EMERGENCY MEDICINE

## 2024-06-08 PROCEDURE — 25810000003 SODIUM CHLORIDE 0.9 % SOLUTION: Performed by: EMERGENCY MEDICINE

## 2024-06-08 RX ORDER — DIPHENHYDRAMINE HYDROCHLORIDE 50 MG/ML
25 INJECTION INTRAMUSCULAR; INTRAVENOUS ONCE
Status: COMPLETED | OUTPATIENT
Start: 2024-06-08 | End: 2024-06-08

## 2024-06-08 RX ORDER — KETOROLAC TROMETHAMINE 30 MG/ML
30 INJECTION, SOLUTION INTRAMUSCULAR; INTRAVENOUS ONCE
Status: COMPLETED | OUTPATIENT
Start: 2024-06-08 | End: 2024-06-08

## 2024-06-08 RX ORDER — NAPROXEN 500 MG/1
500 TABLET ORAL 2 TIMES DAILY PRN
Qty: 20 TABLET | Refills: 0 | Status: SHIPPED | OUTPATIENT
Start: 2024-06-08

## 2024-06-08 RX ORDER — METOCLOPRAMIDE HYDROCHLORIDE 5 MG/ML
10 INJECTION INTRAMUSCULAR; INTRAVENOUS ONCE
Status: COMPLETED | OUTPATIENT
Start: 2024-06-08 | End: 2024-06-08

## 2024-06-08 RX ADMIN — SODIUM CHLORIDE 1000 ML: 9 INJECTION, SOLUTION INTRAVENOUS at 06:28

## 2024-06-08 RX ADMIN — METOCLOPRAMIDE 10 MG: 5 INJECTION, SOLUTION INTRAMUSCULAR; INTRAVENOUS at 06:28

## 2024-06-08 RX ADMIN — DIPHENHYDRAMINE HYDROCHLORIDE 25 MG: 50 INJECTION, SOLUTION INTRAMUSCULAR; INTRAVENOUS at 06:28

## 2024-06-08 RX ADMIN — KETOROLAC TROMETHAMINE 30 MG: 30 INJECTION, SOLUTION INTRAMUSCULAR at 06:28

## 2024-06-08 NOTE — FSED PROVIDER NOTE
Subjective   History of Present Illness    33-year-old woman presents emergency department for headache for the past 2 days.  It was present when she went to bed and it was there all night she could hardly get to sleep.  It came on very strong.  No thunderclap.  Not the worst headache of her life.  Feels like a band sensation around her head.  She has no neurologic symptoms.    Review of Systems    All systems negative except as otherwise mentioned in the HPI    Past Medical History:   Diagnosis Date    Asthma     Kidney stone        Allergies   Allergen Reactions    Penicillins Hives     Beta lactam allergy details  Antibiotic reaction: other (throat closed)  Age at reaction: infant  Dose to reaction time: (!) hours  Reason for antibiotic: unknown  Epinephrine required for reaction?: unknown  Tolerated antibiotics: amoxicillin, cephalexin           Past Surgical History:   Procedure Laterality Date     SECTION      CHOLECYSTECTOMY      DILATION AND CURETTAGE, DIAGNOSTIC / THERAPEUTIC      EXCISION LESION N/A 2024    Procedure: EXCISION of cyst from psterior neck, prone position;  Surgeon: Raul Lawson MD;  Location: [facility] MAIN OR;  Service: General;  Laterality: N/A;    URETERAL STENT INSERTION         History reviewed. No pertinent family history.    Social History     Socioeconomic History    Marital status: Single   Tobacco Use    Smoking status: Every Day     Current packs/day: 1.00     Average packs/day: 1 pack/day for 11.4 years (11.4 ttl pk-yrs)     Types: Cigarettes     Start date:      Passive exposure: Current    Smokeless tobacco: Never   Vaping Use    Vaping status: Never Used   Substance and Sexual Activity    Alcohol use: No    Drug use: Never    Sexual activity: Defer           Objective   Physical Exam    General: Alert and oriented, conversant  Eye: PERRL, EOMI, nomal conjunctiva  HENT: Normocephalic, normal hearing, moist oral mucosa    Lungs: Nonlabored respiration,  no wheezing  Heart: Normal Rate, no mumurs gallops or rubs  Abdomen: Soft, Non tender, no peritoneal signs    Musculoskeletal: Normal range of motion and strength, no tenderness or swelling  Skin: Warm and dry, no alarming rashes  Neurologic: Awake, responsive, moving all extremities, no focal deficits.  Normal neurologic examination no focal neurodeficits  Psychiatric:  Cooperative, appropriate mood and affect    Procedures           ED Course                                           Medical Decision Making  Problems Addressed:  Acute non intractable tension-type headache: complicated acute illness or injury    Risk  Prescription drug management.    33-year-old woman presents emergency department with a tension headache.  She has had it now for the past Monday.  She got a headache cocktail but significantly better.  Not the first not the worst no thunderclap.  Her vital signs are stable she is afebrile.  I had my usual discussion with her but headache precautions.  She wants to forego the CAT scan and she says she will return if he gets worse.  She feels significantly better with medications.  She has normal gait normal cerebellar function and is otherwise stable    Final diagnoses:   Acute non intractable tension-type headache       ED Disposition  ED Disposition       ED Disposition   Discharge    Condition   Stable    Comment   --               [place] [address] [place] [id number] [phone]  In 2 days  If symptoms worsen         Medication List        New Prescriptions      naproxen 500 MG tablet  Commonly known as: NAPROSYN  Take 1 tablet by mouth 2 (Two) Times a Day As Needed for Moderate Pain.               Where to Get Your Medications        These medications were sent to [facility] [address] [facility] [place] - [address] - [phone]  - [phone]   [address] [facility] [place] [address]      Phone: [phone]   naproxen  500 MG tablet

## 2024-06-24 ENCOUNTER — OFFICE VISIT (OUTPATIENT)
Dept: SURGERY | Facility: CLINIC | Age: 34
End: 2024-06-24
Payer: MEDICAID

## 2024-06-24 VITALS
HEIGHT: 67 IN | TEMPERATURE: 98.6 F | WEIGHT: 293 LBS | DIASTOLIC BLOOD PRESSURE: 69 MMHG | OXYGEN SATURATION: 95 % | HEART RATE: 77 BPM | SYSTOLIC BLOOD PRESSURE: 105 MMHG | BODY MASS INDEX: 45.99 KG/M2

## 2024-06-24 DIAGNOSIS — Z09 ENCOUNTER FOR FOLLOW-UP: Primary | ICD-10-CM

## 2024-06-24 PROCEDURE — 1160F RVW MEDS BY RX/DR IN RCRD: CPT | Performed by: SURGERY

## 2024-06-24 PROCEDURE — 1159F MED LIST DOCD IN RCRD: CPT | Performed by: SURGERY

## 2024-06-24 PROCEDURE — 99024 POSTOP FOLLOW-UP VISIT: CPT | Performed by: SURGERY

## 2024-06-24 NOTE — PROGRESS NOTES
CHIEF COMPLAINT:    Chief Complaint   Patient presents with    Wound Check     3 week follow up neck cyst excision 4-16-24       HISTORY OF PRESENT ILLNESS:    Jacqueline Turcios is a 33 y.o. female who underwent excision of an inflamed cyst from her posterior neck previously.  The wound has subsequently opened.  She has been performing local wound care to the site.  It has nearly closed.    EXAM:  Vitals:    06/24/24 1358   BP: 105/69   Pulse: 77   Temp: 98.6 °F (37 °C)   SpO2: 95%         Irritated skin surrounding her posterior neck wound from bandages.  Posterior neck wound has nearly completely closed at this point    ASSESSMENT:    Status post excision of inflamed cyst from posterior neck    PLAN:    Continue to keep the area clean and dry.  Overall it has nearly completely healed.  I will see her back in 1 month to reassess the site.          This document has been electronically signed by Raul Lawson MD on June 24, 2024 14:07 EDT

## 2024-11-03 ENCOUNTER — HOSPITAL ENCOUNTER (OUTPATIENT)
Facility: HOSPITAL | Age: 34
Discharge: HOME | End: 2024-11-03
Attending: EMERGENCY MEDICINE | Admitting: EMERGENCY MEDICINE
Payer: MEDICAID

## 2024-11-03 VITALS
RESPIRATION RATE: 20 BRPM | SYSTOLIC BLOOD PRESSURE: 121 MMHG | DIASTOLIC BLOOD PRESSURE: 72 MMHG | OXYGEN SATURATION: 91 % | HEART RATE: 91 BPM | TEMPERATURE: 98.4 F

## 2024-11-03 DIAGNOSIS — R51.9 ACUTE NONINTRACTABLE HEADACHE, UNSPECIFIED HEADACHE TYPE: Primary | ICD-10-CM

## 2024-11-03 LAB
FLUAV SUBTYP SPEC NAA+PROBE: NOT DETECTED
FLUBV RNA ISLT QL NAA+PROBE: NOT DETECTED
SARS-COV-2 RNA RESP QL NAA+PROBE: NOT DETECTED

## 2024-11-03 PROCEDURE — 63710000001 DIPHENHYDRAMINE PER 50 MG

## 2024-11-03 PROCEDURE — 87636 SARSCOV2 & INF A&B AMP PRB: CPT | Performed by: EMERGENCY MEDICINE

## 2024-11-03 PROCEDURE — G0463 HOSPITAL OUTPT CLINIC VISIT: HCPCS

## 2024-11-03 PROCEDURE — 25010000002 KETOROLAC TROMETHAMINE PER 15 MG

## 2024-11-03 RX ORDER — DIPHENHYDRAMINE HCL 25 MG
50 CAPSULE ORAL ONCE
Status: COMPLETED | OUTPATIENT
Start: 2024-11-03 | End: 2024-11-03

## 2024-11-03 RX ORDER — KETOROLAC TROMETHAMINE 30 MG/ML
30 INJECTION, SOLUTION INTRAMUSCULAR; INTRAVENOUS ONCE
Status: COMPLETED | OUTPATIENT
Start: 2024-11-03 | End: 2024-11-03

## 2024-11-03 RX ADMIN — DIPHENHYDRAMINE HYDROCHLORIDE 50 MG: 25 CAPSULE ORAL at 12:36

## 2024-11-03 RX ADMIN — KETOROLAC TROMETHAMINE 30 MG: 30 INJECTION, SOLUTION INTRAMUSCULAR at 12:37

## 2024-11-03 NOTE — DISCHARGE INSTRUCTIONS
Begin Flonase nasal steroid spray to help with nasal congestion    Increase your fluid intake with water and Pedialyte    Recommend warm compresses over your ears to facilitate removal of earwax    Follow-up with family doctor as needed return to ER for worsening

## 2024-11-03 NOTE — FSED PROVIDER NOTE
Subjective   History of Present Illness  34-year-old female reports sudden onset of headache approximately 3 to 4 hours ago.  She admits that she has a lot of anxiety and said that she had been resting at home and felt pressure in her sinuses and inner ears.  She reports taking Excedrin and Zofran and says that this has helped her symptoms.  She is denying any vomiting or diarrhea chest pain shortness of breath she is denying any visual deficits.  She denies any known ill contacts.        Review of Systems   All other systems reviewed and are negative.      Past Medical History:   Diagnosis Date    Asthma     Kidney stone        Allergies   Allergen Reactions    Penicillins Hives     Beta lactam allergy details  Antibiotic reaction: other (throat closed)  Age at reaction: infant  Dose to reaction time: (!) hours  Reason for antibiotic: unknown  Epinephrine required for reaction?: unknown  Tolerated antibiotics: amoxicillin, cephalexin           Past Surgical History:   Procedure Laterality Date     SECTION      CHOLECYSTECTOMY      DILATION AND CURETTAGE, DIAGNOSTIC / THERAPEUTIC      EXCISION LESION N/A 2024    Procedure: EXCISION of cyst from psterior neck, prone position;  Surgeon: Raul Lawson MD;  Location: [facility] OR;  Service: General;  Laterality: N/A;    URETERAL STENT INSERTION         History reviewed. No pertinent family history.    Social History     Socioeconomic History    Marital status: Single   Tobacco Use    Smoking status: Every Day     Current packs/day: 1.00     Average packs/day: 1 pack/day for 11.8 years (11.8 ttl pk-yrs)     Types: Cigarettes     Start date:      Passive exposure: Current    Smokeless tobacco: Never   Vaping Use    Vaping status: Never Used   Substance and Sexual Activity    Alcohol use: No    Drug use: Never    Sexual activity: Defer           Objective   Physical Exam  Vitals and nursing note reviewed.   Constitutional:       General: She  is not in acute distress.     Appearance: Normal appearance. She is normal weight. She is not toxic-appearing.      Comments: Anxious affect   HENT:      Head: Normocephalic and atraumatic.      Ears:      Comments: Large amount earwax right ear canal the left tympanic membrane appears normal     Nose: Nose normal.      Mouth/Throat:      Mouth: Mucous membranes are moist.      Pharynx: Oropharynx is clear.   Eyes:      Extraocular Movements: Extraocular movements intact.      Conjunctiva/sclera: Conjunctivae normal.      Pupils: Pupils are equal, round, and reactive to light.   Cardiovascular:      Rate and Rhythm: Normal rate.      Pulses: Normal pulses.   Pulmonary:      Effort: Pulmonary effort is normal.      Breath sounds: Normal breath sounds.   Abdominal:      General: Abdomen is flat.      Palpations: Abdomen is soft.   Musculoskeletal:         General: Normal range of motion.      Cervical back: Normal range of motion and neck supple. No rigidity.   Skin:     General: Skin is warm.   Neurological:      General: No focal deficit present.      Mental Status: She is alert and oriented to person, place, and time. Mental status is at baseline.      Sensory: No sensory deficit.      Motor: No weakness.         Procedures           ED Course  ED Course as of 11/03/24 1312   Sun Nov 03, 2024   1252 COVID and flu are negative [WF]      ED Course User Index  [WF] Kaz Blanc Jr., APRN                                           Medical Decision Making  Patient's headache is improving after Excedrin and Zofran several hours ago.  Patient admits to being very anxious.  She does report a history of migraine headaches.  Will treat headache with Toradol and Benadryl.  Have also ordered a COVID and flu swab to rule out viral process.    COVID and flu are negative I reassessed the patient she is resting comfortably she reports a decrease in headache pain after receiving Toradol and Benadryl.  She is agreeable  discharge home with Flonase.    Problems Addressed:  Acute nonintractable headache, unspecified headache type: complicated acute illness or injury    Risk  Prescription drug management.        Final diagnoses:   Acute nonintractable headache, unspecified headache type       ED Disposition  ED Disposition       ED Disposition   Discharge    Condition   Stable    Comment   --               [facility]  [facility] [address]  [phone]  Schedule an appointment as soon as possible for a visit   Or follow-up with your primary care provider         Medication List      No changes were made to your prescriptions during this visit.

## 2024-11-14 ENCOUNTER — HOSPITAL ENCOUNTER (EMERGENCY)
Facility: HOSPITAL | Age: 34
Discharge: HOME | End: 2024-11-14
Payer: MEDICAID

## 2024-11-14 VITALS
SYSTOLIC BLOOD PRESSURE: 130 MMHG | HEIGHT: 67 IN | OXYGEN SATURATION: 94 % | BODY MASS INDEX: 45.99 KG/M2 | RESPIRATION RATE: 18 BRPM | HEART RATE: 72 BPM | DIASTOLIC BLOOD PRESSURE: 77 MMHG | WEIGHT: 293 LBS | TEMPERATURE: 97.6 F

## 2024-11-14 DIAGNOSIS — F41.0 PANIC ATTACK: Primary | ICD-10-CM

## 2024-11-14 DIAGNOSIS — F41.9 ANXIETY: ICD-10-CM

## 2024-11-14 LAB
ANION GAP SERPL CALCULATED.3IONS-SCNC: 4.5 MMOL/L (ref 5–15)
B-HCG UR QL: NEGATIVE
BASOPHILS # BLD AUTO: 0.03 10*3/MM3 (ref 0–0.2)
BASOPHILS NFR BLD AUTO: 0.3 % (ref 0–1.5)
BUN SERPL-MCNC: 11 MG/DL (ref 6–20)
BUN/CREAT SERPL: 18.3 (ref 7–25)
CALCIUM SPEC-SCNC: 9.3 MG/DL (ref 8.6–10.5)
CHLORIDE SERPL-SCNC: 100 MMOL/L (ref 98–107)
CO2 SERPL-SCNC: 32.5 MMOL/L (ref 22–29)
CREAT SERPL-MCNC: 0.6 MG/DL (ref 0.57–1)
D DIMER PPP FEU-MCNC: <0.27 MCGFEU/ML (ref 0–0.5)
DEPRECATED RDW RBC AUTO: 46.3 FL (ref 37–54)
EGFRCR SERPLBLD CKD-EPI 2021: 121 ML/MIN/1.73
EOSINOPHIL # BLD AUTO: 0.05 10*3/MM3 (ref 0–0.4)
EOSINOPHIL NFR BLD AUTO: 0.6 % (ref 0.3–6.2)
ERYTHROCYTE [DISTWIDTH] IN BLOOD BY AUTOMATED COUNT: 14.4 % (ref 12.3–15.4)
GLUCOSE SERPL-MCNC: 107 MG/DL (ref 65–99)
HCT VFR BLD AUTO: 39 % (ref 34–46.6)
HGB BLD-MCNC: 12.1 G/DL (ref 12–15.9)
IMM GRANULOCYTES # BLD AUTO: 0.03 10*3/MM3 (ref 0–0.05)
IMM GRANULOCYTES NFR BLD AUTO: 0.3 % (ref 0–0.5)
LYMPHOCYTES # BLD AUTO: 1.95 10*3/MM3 (ref 0.7–3.1)
LYMPHOCYTES NFR BLD AUTO: 21.5 % (ref 19.6–45.3)
MAGNESIUM SERPL-MCNC: 2.1 MG/DL (ref 1.6–2.6)
MCH RBC QN AUTO: 27.6 PG (ref 26.6–33)
MCHC RBC AUTO-ENTMCNC: 31 G/DL (ref 31.5–35.7)
MCV RBC AUTO: 88.8 FL (ref 79–97)
MONOCYTES # BLD AUTO: 0.75 10*3/MM3 (ref 0.1–0.9)
MONOCYTES NFR BLD AUTO: 8.3 % (ref 5–12)
NEUTROPHILS NFR BLD AUTO: 6.26 10*3/MM3 (ref 1.7–7)
NEUTROPHILS NFR BLD AUTO: 69 % (ref 42.7–76)
NRBC BLD AUTO-RTO: 0 /100 WBC (ref 0–0.2)
PLATELET # BLD AUTO: 238 10*3/MM3 (ref 140–450)
PMV BLD AUTO: 10.5 FL (ref 6–12)
POTASSIUM SERPL-SCNC: 4.8 MMOL/L (ref 3.5–5.2)
RBC # BLD AUTO: 4.39 10*6/MM3 (ref 3.77–5.28)
SODIUM SERPL-SCNC: 137 MMOL/L (ref 136–145)
T4 FREE SERPL-MCNC: 1.12 NG/DL (ref 0.93–1.7)
TSH SERPL DL<=0.05 MIU/L-ACNC: 0.86 UIU/ML (ref 0.27–4.2)
WBC NRBC COR # BLD AUTO: 9.07 10*3/MM3 (ref 3.4–10.8)

## 2024-11-14 PROCEDURE — 83735 ASSAY OF MAGNESIUM: CPT | Performed by: PHYSICIAN ASSISTANT

## 2024-11-14 PROCEDURE — 96374 THER/PROPH/DIAG INJ IV PUSH: CPT

## 2024-11-14 PROCEDURE — 93005 ELECTROCARDIOGRAM TRACING: CPT | Performed by: PHYSICIAN ASSISTANT

## 2024-11-14 PROCEDURE — 81025 URINE PREGNANCY TEST: CPT | Performed by: PHYSICIAN ASSISTANT

## 2024-11-14 PROCEDURE — 84443 ASSAY THYROID STIM HORMONE: CPT | Performed by: PHYSICIAN ASSISTANT

## 2024-11-14 PROCEDURE — 85025 COMPLETE CBC W/AUTO DIFF WBC: CPT | Performed by: PHYSICIAN ASSISTANT

## 2024-11-14 PROCEDURE — 80048 BASIC METABOLIC PNL TOTAL CA: CPT | Performed by: PHYSICIAN ASSISTANT

## 2024-11-14 PROCEDURE — 85379 FIBRIN DEGRADATION QUANT: CPT | Performed by: EMERGENCY MEDICINE

## 2024-11-14 PROCEDURE — 99283 EMERGENCY DEPT VISIT LOW MDM: CPT

## 2024-11-14 PROCEDURE — 36415 COLL VENOUS BLD VENIPUNCTURE: CPT

## 2024-11-14 PROCEDURE — 96376 TX/PRO/DX INJ SAME DRUG ADON: CPT

## 2024-11-14 PROCEDURE — 84439 ASSAY OF FREE THYROXINE: CPT | Performed by: PHYSICIAN ASSISTANT

## 2024-11-14 PROCEDURE — 25010000002 LORAZEPAM PER 2 MG: Performed by: PHYSICIAN ASSISTANT

## 2024-11-14 RX ORDER — LORAZEPAM 2 MG/ML
0.5 INJECTION INTRAMUSCULAR ONCE
Status: COMPLETED | OUTPATIENT
Start: 2024-11-14 | End: 2024-11-14

## 2024-11-14 RX ORDER — SODIUM CHLORIDE 0.9 % (FLUSH) 0.9 %
10 SYRINGE (ML) INJECTION AS NEEDED
Status: DISCONTINUED | OUTPATIENT
Start: 2024-11-14 | End: 2024-11-14 | Stop reason: HOSPADM

## 2024-11-14 RX ORDER — HYDROXYZINE HYDROCHLORIDE 25 MG/1
50 TABLET, FILM COATED ORAL EVERY 6 HOURS PRN
Qty: 20 TABLET | Refills: 0 | Status: SHIPPED | OUTPATIENT
Start: 2024-11-14

## 2024-11-14 RX ADMIN — LORAZEPAM 0.5 MG: 2 INJECTION INTRAMUSCULAR; INTRAVENOUS at 10:30

## 2024-11-14 RX ADMIN — LORAZEPAM 0.5 MG: 2 INJECTION INTRAMUSCULAR; INTRAVENOUS at 12:19

## 2024-11-14 NOTE — ED PROVIDER NOTES
"Subjective   History of Present Illness  Patient is a 34-year-old female presenting to the ED with reports of feeling anxious.  She states her symptoms started about 8 AM yesterday she feels like she is having a panic attack.  She states she feels \"panicky on my inside\" she denies significant pain.  Reports some paresthesias at times.  No dyspnea.  No recent fever or illness.  Patient does report stress at home but nothing more than her normal and she also has a 19-month. She denies any suicidal or homicidal ideations.  She denies any new triggers.  She does smoke about a pack of cigarettes daily denies any alcohol or illicit drug use. She denies excessive caffeine intake.  Patient states she tried taking a hot shower a cold shower going on a walk with no improvement.  States she is never been diagnosed with anxiety or depression but states she does have an appointment with her PCP on the  in regards to anxiety and depression.        Review of Systems   Constitutional:  Negative for chills and fever.   Respiratory: Negative.     Cardiovascular: Negative.    Gastrointestinal:  Negative for abdominal distention, abdominal pain, nausea and vomiting.   Musculoskeletal:  Negative for back pain.   Skin: Negative.    Neurological:  Negative for headaches.   Psychiatric/Behavioral:  Negative for sleep disturbance and suicidal ideas. The patient is nervous/anxious.        Past Medical History:   Diagnosis Date    Asthma     Kidney stone        Allergies   Allergen Reactions    Penicillins Hives     Beta lactam allergy details  Antibiotic reaction: other (throat closed)  Age at reaction: infant  Dose to reaction time: (!) hours  Reason for antibiotic: unknown  Epinephrine required for reaction?: unknown  Tolerated antibiotics: amoxicillin, cephalexin           Past Surgical History:   Procedure Laterality Date     SECTION      CHOLECYSTECTOMY      DILATION AND CURETTAGE, DIAGNOSTIC / THERAPEUTIC      EXCISION " LESION N/A 4/16/2024    Procedure: EXCISION of cyst from psterior neck, prone position;  Surgeon: Raul Lawson MD;  Location: [facility] MAIN OR;  Service: General;  Laterality: N/A;    URETERAL STENT INSERTION         No family history on file.    Social History     Socioeconomic History    Marital status: Single   Tobacco Use    Smoking status: Every Day     Current packs/day: 1.00     Average packs/day: 1 pack/day for 11.9 years (11.9 ttl pk-yrs)     Types: Cigarettes     Start date: 2013     Passive exposure: Current    Smokeless tobacco: Never   Vaping Use    Vaping status: Never Used   Substance and Sexual Activity    Alcohol use: No    Drug use: Never    Sexual activity: Defer           Objective   Physical Exam  Vitals and nursing note reviewed.   Constitutional:       General: She is not in acute distress.     Appearance: She is well-developed. She is obese. She is not ill-appearing, toxic-appearing or diaphoretic.   HENT:      Head: Normocephalic and atraumatic.      Mouth/Throat:      Mouth: Mucous membranes are moist.      Pharynx: Oropharynx is clear.   Eyes:      General: No scleral icterus.     Extraocular Movements: Extraocular movements intact.      Pupils: Pupils are equal, round, and reactive to light.   Cardiovascular:      Rate and Rhythm: Normal rate and regular rhythm.      Pulses: Normal pulses.      Heart sounds: No murmur heard.     No friction rub. No gallop.   Pulmonary:      Effort: Pulmonary effort is normal. No respiratory distress.      Breath sounds: Normal breath sounds. No stridor. No wheezing, rhonchi or rales.   Chest:      Chest wall: No tenderness.   Abdominal:      General: Bowel sounds are normal. There is no distension. There are no signs of injury.      Palpations: Abdomen is soft.      Tenderness: There is no abdominal tenderness. There is no guarding or rebound.   Skin:     General: Skin is warm.      Capillary Refill: Capillary refill takes less than 2 seconds.  "     Coloration: Skin is not cyanotic, jaundiced or pale.      Findings: No rash.   Neurological:      General: No focal deficit present.      Mental Status: She is alert and oriented to person, place, and time.      GCS: GCS eye subscore is 4. GCS verbal subscore is 5. GCS motor subscore is 6.   Psychiatric:         Mood and Affect: Mood is anxious.         Behavior: Behavior normal.         Thought Content: Thought content does not include homicidal or suicidal plan.         Procedures           ED Course  ED Course as of 11/14/24 1400   Thu Nov 14, 2024   1248 After second round of Ativan patient was feeling really good when she went to the bathroom when she came back she said like she could feel it happening and she felt really short of breath will add on D-dimer and EKG she denies current chest pain [AA]      ED Course User Index  [AA] Scarlet Mott PA      /77 (BP Location: Left arm, Patient Position: Lying)   Pulse 72   Temp 97.6 °F (36.4 °C) (Oral)   Resp 18   Ht 170.2 cm (67\")   Wt (!) 144 kg (317 lb 7.4 oz)   LMP 10/21/2024 (Approximate)   SpO2 94%   BMI 49.72 kg/m²   Medications   sodium chloride 0.9 % flush 10 mL (has no administration in time range)   LORazepam (ATIVAN) injection 0.5 mg (0.5 mg Intravenous Given 11/14/24 1030)   LORazepam (ATIVAN) injection 0.5 mg (0.5 mg Intravenous Given 11/14/24 1219)     Labs Reviewed   BASIC METABOLIC PANEL - Abnormal; Notable for the following components:       Result Value    Glucose 107 (*)     CO2 32.5 (*)     Anion Gap 4.5 (*)     All other components within normal limits    Narrative:     GFR Normal >60  Chronic Kidney Disease <60  Kidney Failure <15     CBC WITH AUTO DIFFERENTIAL - Abnormal; Notable for the following components:    MCHC 31.0 (*)     All other components within normal limits   TSH - Normal   T4, FREE - Normal   MAGNESIUM - Normal   PREGNANCY, URINE - Normal   D-DIMER, QUANTITATIVE - Normal    Narrative:     According to " "the assay 's published package insert, a normal (<0.50 MCGFEU/mL) D-dimer result in conjunction with a non-high clinical probability assessment, excludes deep vein thrombosis (DVT) and pulmonary embolism (PE) with high sensitivity.    D-dimer values increase with age and this can make VTE exclusion of an older population difficult. To address this, the [employer], based on best available evidence and recent guidelines, recommends that clinicians use age-adjusted D-dimer thresholds in patients greater than 50 years of age with: a) a low probability of PE who do not meet all Pulmonary Embolism Rule Out Criteria, or b) in those with intermediate probability of PE.   The formula for an age-adjusted D-dimer cut-off is \"age/100\".  For example, a 60 year old patient would have an age-adjusted cut-off of 0.60 MCGFEU/mL and an 80 year old 0.80 MCGFEU/mL.   CBC AND DIFFERENTIAL    Narrative:     The following orders were created for panel order CBC & Differential.  Procedure                               Abnormality         Status                     ---------                               -----------         ------                     CBC Auto Differential[[id number]]        Abnormal            Final result                 Please view results for these tests on the individual orders.     No orders to display                                                        Medical Decision Making  Chart Review: [facility] note reviewed from 11/30/2024 was seen for reports of sudden onset headache and anxiety had improved headache after Excedrin and Zofran also given Toradol and Benadryl for headache COVID and flu were negative    Differentials: Anxiety, electrolyte abnormality, thyroid disorder, dehydration      ;this list is not all inclusive and does not constitute the entirety of considered causes  EKG: Interpreted by myself and shows sinus rhythm rate 78 low voltage in precordial leads previous EKG reviewed " from 4/12/2024  Labs: As above  Radiology:   No orders to display    Disposition/Treatment:  Appropriate PPE was worn during exam and throughout all encounters with the patient.  Patient presented to the ED with reports of feeling anxious.  She was given Ativan with significant improvement.  EKG showed no acute STEMI labs and imaging were obtained to look for things such as infection, electrolyte abnormality, thyroid disorder, dehydration  CBC showed no leukocytosis or anemia.  Metabolic panel fairly unremarkable glucose 107 bicarb 32.5 anion gap 4.5.  Levels including TSH and free T4 normal.  Magnesium negative.  Urine pregnancy negative.  D-dimer less than 0.27.  System patient is resting quietly findings were discussed the patient and family at bedside voiced understanding and discharge along with signs symptoms to return.  She will be given hydroxyzine for home she does have follow-up with her PCP in regards to anxiety next week.  She was offered to speak to psychiatry while here in regards to her anxiety but ultimately declined.  She is not suicidal or homicidal all questions were answered at bedside.    This document is intended for medical expert use only. Reading of this document by patients and/or patient's family without participating medical staff guidance may result in misinterpretation and unintended morbidity.  Any interpretation of such data is the responsibility of the patient and/or family member responsible for the patient in concert with their primary or specialist providers, not to be left for sources of online searches such as [employer], [employer] or similar queries. Relying on these approaches to knowledge may result in misinterpretation, misguided goals of care and even death should patients or family members try recommendations outside of the realm of professional medical care in a supervised inpatient environment.       Problems Addressed:  Anxiety: acute illness or injury  Panic attack: acute  illness or injury    Amount and/or Complexity of Data Reviewed  Labs: ordered. Decision-making details documented in ED Course.  ECG/medicine tests: ordered.    Risk  Prescription drug management.        Final diagnoses:   Panic attack   Anxiety       ED Disposition  ED Disposition       ED Disposition   Discharge    Condition   Stable    Comment   --               No follow-up provider specified.       Medication List        New Prescriptions      hydrOXYzine 25 MG tablet  Commonly known as: ATARAX  Take 2 tablets by mouth Every 6 (Six) Hours As Needed for Anxiety.               Where to Get Your Medications        These medications were sent to [id number] - [facility] [place] - [address] - [phone]  - [phone]   [address] [place] [address]      Phone: [phone]   hydrOXYzine 25 MG tablet            Scarlet Mott PA  11/14/24 1400

## 2024-11-14 NOTE — DISCHARGE INSTRUCTIONS
Take medication as directed.    Follow-up with your primary care provider in 3-5 days.  If you do not have a primary care provider call [phone] for help in finding one, or you may follow up with [facility] at [phone].    Return to ED for any new or worsening symptoms

## 2024-11-15 LAB
QT INTERVAL: 388 MS
QTC INTERVAL: 442 MS

## 2025-01-19 ENCOUNTER — APPOINTMENT (OUTPATIENT)
Dept: CT IMAGING | Facility: HOSPITAL | Age: 35
End: 2025-01-19
Payer: MEDICAID

## 2025-01-19 ENCOUNTER — HOSPITAL ENCOUNTER (INPATIENT)
Facility: HOSPITAL | Age: 35
LOS: 2 days | Discharge: HOME | End: 2025-01-21
Attending: EMERGENCY MEDICINE | Admitting: STUDENT IN AN ORGANIZED HEALTH CARE EDUCATION/TRAINING PROGRAM
Payer: MEDICAID

## 2025-01-19 ENCOUNTER — APPOINTMENT (OUTPATIENT)
Dept: GENERAL RADIOLOGY | Facility: HOSPITAL | Age: 35
End: 2025-01-19
Payer: MEDICAID

## 2025-01-19 DIAGNOSIS — J20.6 ACUTE BRONCHITIS DUE TO RHINOVIRUS: ICD-10-CM

## 2025-01-19 DIAGNOSIS — T50.905A ADVERSE EFFECT OF DRUG, INITIAL ENCOUNTER: Primary | ICD-10-CM

## 2025-01-19 DIAGNOSIS — J96.01 ACUTE HYPOXIC RESPIRATORY FAILURE: ICD-10-CM

## 2025-01-19 DIAGNOSIS — R51.9 GENERALIZED HEADACHE: ICD-10-CM

## 2025-01-19 DIAGNOSIS — R09.02 HYPOXIA: ICD-10-CM

## 2025-01-19 LAB
ALBUMIN SERPL-MCNC: 3.7 G/DL (ref 3.5–5.2)
ALBUMIN SERPL-MCNC: 3.9 G/DL (ref 3.5–5.2)
ALBUMIN/GLOB SERPL: 0.9 G/DL
ALBUMIN/GLOB SERPL: 1.1 G/DL
ALP SERPL-CCNC: 143 U/L (ref 39–117)
ALP SERPL-CCNC: 157 U/L (ref 39–117)
ALT SERPL W P-5'-P-CCNC: 38 U/L (ref 1–33)
ALT SERPL W P-5'-P-CCNC: 43 U/L (ref 1–33)
ANION GAP SERPL CALCULATED.3IONS-SCNC: 4.9 MMOL/L (ref 5–15)
ANION GAP SERPL CALCULATED.3IONS-SCNC: 5.5 MMOL/L (ref 5–15)
ARTERIAL PATENCY WRIST A: POSITIVE
AST SERPL-CCNC: 25 U/L (ref 1–32)
AST SERPL-CCNC: 38 U/L (ref 1–32)
ATMOSPHERIC PRESS: ABNORMAL MM[HG]
B PARAPERT DNA SPEC QL NAA+PROBE: NOT DETECTED
B PERT DNA SPEC QL NAA+PROBE: NOT DETECTED
BASE EXCESS BLDA CALC-SCNC: 9 MMOL/L (ref 0–3)
BASOPHILS # BLD AUTO: 0.01 10*3/MM3 (ref 0–0.2)
BASOPHILS # BLD AUTO: 0.01 10*3/MM3 (ref 0–0.2)
BASOPHILS NFR BLD AUTO: 0.1 % (ref 0–1.5)
BASOPHILS NFR BLD AUTO: 0.1 % (ref 0–1.5)
BDY SITE: ABNORMAL
BILIRUB SERPL-MCNC: 0.2 MG/DL (ref 0–1.2)
BILIRUB SERPL-MCNC: 0.2 MG/DL (ref 0–1.2)
BUN SERPL-MCNC: 10 MG/DL (ref 6–20)
BUN SERPL-MCNC: 11 MG/DL (ref 6–20)
BUN/CREAT SERPL: 14.1 (ref 7–25)
BUN/CREAT SERPL: 14.9 (ref 7–25)
C PNEUM DNA NPH QL NAA+NON-PROBE: NOT DETECTED
CALCIUM SPEC-SCNC: 9.5 MG/DL (ref 8.6–10.5)
CALCIUM SPEC-SCNC: 9.5 MG/DL (ref 8.6–10.5)
CHLORIDE SERPL-SCNC: 95 MMOL/L (ref 98–107)
CHLORIDE SERPL-SCNC: 97 MMOL/L (ref 98–107)
CO2 BLDA-SCNC: 39.3 MMOL/L (ref 22–29)
CO2 SERPL-SCNC: 33.5 MMOL/L (ref 22–29)
CO2 SERPL-SCNC: 38.1 MMOL/L (ref 22–29)
CREAT SERPL-MCNC: 0.71 MG/DL (ref 0.57–1)
CREAT SERPL-MCNC: 0.74 MG/DL (ref 0.57–1)
D DIMER PPP FEU-MCNC: 0.34 MCGFEU/ML (ref 0–0.5)
D-LACTATE SERPL-SCNC: 1.4 MMOL/L (ref 0.5–2)
DEPRECATED RDW RBC AUTO: 45.2 FL (ref 37–54)
DEPRECATED RDW RBC AUTO: 46.7 FL (ref 37–54)
EGFRCR SERPLBLD CKD-EPI 2021: 109 ML/MIN/1.73
EGFRCR SERPLBLD CKD-EPI 2021: 114.6 ML/MIN/1.73
EOSINOPHIL # BLD AUTO: 0.02 10*3/MM3 (ref 0–0.4)
EOSINOPHIL # BLD AUTO: 0.02 10*3/MM3 (ref 0–0.4)
EOSINOPHIL NFR BLD AUTO: 0.2 % (ref 0.3–6.2)
EOSINOPHIL NFR BLD AUTO: 0.2 % (ref 0.3–6.2)
ERYTHROCYTE [DISTWIDTH] IN BLOOD BY AUTOMATED COUNT: 13.8 % (ref 12.3–15.4)
ERYTHROCYTE [DISTWIDTH] IN BLOOD BY AUTOMATED COUNT: 14.2 % (ref 12.3–15.4)
FLUAV SUBTYP SPEC NAA+PROBE: NOT DETECTED
FLUAV SUBTYP SPEC NAA+PROBE: NOT DETECTED
FLUBV RNA ISLT QL NAA+PROBE: NOT DETECTED
FLUBV RNA ISLT QL NAA+PROBE: NOT DETECTED
GLOBULIN UR ELPH-MCNC: 3.6 GM/DL
GLOBULIN UR ELPH-MCNC: 3.9 GM/DL
GLUCOSE SERPL-MCNC: 107 MG/DL (ref 65–99)
GLUCOSE SERPL-MCNC: 107 MG/DL (ref 65–99)
HADV DNA SPEC NAA+PROBE: NOT DETECTED
HCO3 BLDA-SCNC: 37.2 MMOL/L (ref 21–28)
HCOV 229E RNA SPEC QL NAA+PROBE: NOT DETECTED
HCOV HKU1 RNA SPEC QL NAA+PROBE: NOT DETECTED
HCOV NL63 RNA SPEC QL NAA+PROBE: NOT DETECTED
HCOV OC43 RNA SPEC QL NAA+PROBE: NOT DETECTED
HCT VFR BLD AUTO: 39.6 % (ref 34–46.6)
HCT VFR BLD AUTO: 40.6 % (ref 34–46.6)
HEMODILUTION: NO
HGB BLD-MCNC: 11.9 G/DL (ref 12–15.9)
HGB BLD-MCNC: 12.3 G/DL (ref 12–15.9)
HMPV RNA NPH QL NAA+NON-PROBE: NOT DETECTED
HPIV1 RNA ISLT QL NAA+PROBE: NOT DETECTED
HPIV2 RNA SPEC QL NAA+PROBE: NOT DETECTED
HPIV3 RNA NPH QL NAA+PROBE: NOT DETECTED
HPIV4 P GENE NPH QL NAA+PROBE: NOT DETECTED
IMM GRANULOCYTES # BLD AUTO: 0.03 10*3/MM3 (ref 0–0.05)
IMM GRANULOCYTES # BLD AUTO: 0.06 10*3/MM3 (ref 0–0.05)
IMM GRANULOCYTES NFR BLD AUTO: 0.4 % (ref 0–0.5)
IMM GRANULOCYTES NFR BLD AUTO: 0.6 % (ref 0–0.5)
INHALED O2 CONCENTRATION: 36 %
LYMPHOCYTES # BLD AUTO: 1.06 10*3/MM3 (ref 0.7–3.1)
LYMPHOCYTES # BLD AUTO: 1.57 10*3/MM3 (ref 0.7–3.1)
LYMPHOCYTES NFR BLD AUTO: 13 % (ref 19.6–45.3)
LYMPHOCYTES NFR BLD AUTO: 16.4 % (ref 19.6–45.3)
M PNEUMO IGG SER IA-ACNC: NOT DETECTED
MAGNESIUM SERPL-MCNC: 2.2 MG/DL (ref 1.6–2.6)
MCH RBC QN AUTO: 26.8 PG (ref 26.6–33)
MCH RBC QN AUTO: 26.9 PG (ref 26.6–33)
MCHC RBC AUTO-ENTMCNC: 30.1 G/DL (ref 31.5–35.7)
MCHC RBC AUTO-ENTMCNC: 30.3 G/DL (ref 31.5–35.7)
MCV RBC AUTO: 88.8 FL (ref 79–97)
MCV RBC AUTO: 89.2 FL (ref 79–97)
METHGB BLD QL: <1 % (ref 0–1.5)
MODALITY: ABNORMAL
MONOCYTES # BLD AUTO: 0.53 10*3/MM3 (ref 0.1–0.9)
MONOCYTES # BLD AUTO: 0.77 10*3/MM3 (ref 0.1–0.9)
MONOCYTES NFR BLD AUTO: 6.5 % (ref 5–12)
MONOCYTES NFR BLD AUTO: 8 % (ref 5–12)
NEUTROPHILS NFR BLD AUTO: 6.51 10*3/MM3 (ref 1.7–7)
NEUTROPHILS NFR BLD AUTO: 7.16 10*3/MM3 (ref 1.7–7)
NEUTROPHILS NFR BLD AUTO: 74.7 % (ref 42.7–76)
NEUTROPHILS NFR BLD AUTO: 79.8 % (ref 42.7–76)
NRBC BLD AUTO-RTO: 0 /100 WBC (ref 0–0.2)
NT-PROBNP SERPL-MCNC: 153.9 PG/ML (ref 0–450)
PCO2 BLDA: 67.6 MM HG (ref 35–48)
PH BLDA: 7.35 PH UNITS (ref 7.35–7.45)
PHOSPHATE SERPL-MCNC: 3.3 MG/DL (ref 2.5–4.5)
PLATELET # BLD AUTO: 250 10*3/MM3 (ref 140–450)
PLATELET # BLD AUTO: 254 10*3/MM3 (ref 140–450)
PMV BLD AUTO: 9.2 FL (ref 6–12)
PMV BLD AUTO: 9.4 FL (ref 6–12)
PO2 BLD: 197 MM[HG] (ref 0–500)
PO2 BLDA: 70.9 MM HG (ref 83–108)
POTASSIUM SERPL-SCNC: 4.8 MMOL/L (ref 3.5–5.2)
POTASSIUM SERPL-SCNC: 5 MMOL/L (ref 3.5–5.2)
PROT SERPL-MCNC: 7.5 G/DL (ref 6–8.5)
PROT SERPL-MCNC: 7.6 G/DL (ref 6–8.5)
QT INTERVAL: 348 MS
QTC INTERVAL: 431 MS
RBC # BLD AUTO: 4.44 10*6/MM3 (ref 3.77–5.28)
RBC # BLD AUTO: 4.57 10*6/MM3 (ref 3.77–5.28)
RHINOVIRUS RNA SPEC NAA+PROBE: DETECTED
RSV RNA NPH QL NAA+NON-PROBE: NOT DETECTED
SAO2 % BLDCOA: 92.3 % (ref 94–98)
SARS-COV-2 RNA RESP QL NAA+PROBE: NOT DETECTED
SARS-COV-2 RNA RESP QL NAA+PROBE: NOT DETECTED
SODIUM SERPL-SCNC: 136 MMOL/L (ref 136–145)
SODIUM SERPL-SCNC: 138 MMOL/L (ref 136–145)
WBC NRBC COR # BLD AUTO: 8.16 10*3/MM3 (ref 3.4–10.8)
WBC NRBC COR # BLD AUTO: 9.59 10*3/MM3 (ref 3.4–10.8)

## 2025-01-19 PROCEDURE — 94761 N-INVAS EAR/PLS OXIMETRY MLT: CPT

## 2025-01-19 PROCEDURE — 71275 CT ANGIOGRAPHY CHEST: CPT

## 2025-01-19 PROCEDURE — 36600 WITHDRAWAL OF ARTERIAL BLOOD: CPT | Performed by: STUDENT IN AN ORGANIZED HEALTH CARE EDUCATION/TRAINING PROGRAM

## 2025-01-19 PROCEDURE — 99285 EMERGENCY DEPT VISIT HI MDM: CPT | Performed by: EMERGENCY MEDICINE

## 2025-01-19 PROCEDURE — 25510000001 IOPAMIDOL PER 1 ML: Performed by: EMERGENCY MEDICINE

## 2025-01-19 PROCEDURE — 85025 COMPLETE CBC W/AUTO DIFF WBC: CPT | Performed by: EMERGENCY MEDICINE

## 2025-01-19 PROCEDURE — 71045 X-RAY EXAM CHEST 1 VIEW: CPT

## 2025-01-19 PROCEDURE — 85379 FIBRIN DEGRADATION QUANT: CPT | Performed by: EMERGENCY MEDICINE

## 2025-01-19 PROCEDURE — 83735 ASSAY OF MAGNESIUM: CPT | Performed by: STUDENT IN AN ORGANIZED HEALTH CARE EDUCATION/TRAINING PROGRAM

## 2025-01-19 PROCEDURE — 99285 EMERGENCY DEPT VISIT HI MDM: CPT

## 2025-01-19 PROCEDURE — 0202U NFCT DS 22 TRGT SARS-COV-2: CPT | Performed by: STUDENT IN AN ORGANIZED HEALTH CARE EDUCATION/TRAINING PROGRAM

## 2025-01-19 PROCEDURE — 85025 COMPLETE CBC W/AUTO DIFF WBC: CPT | Performed by: STUDENT IN AN ORGANIZED HEALTH CARE EDUCATION/TRAINING PROGRAM

## 2025-01-19 PROCEDURE — 80053 COMPREHEN METABOLIC PANEL: CPT | Performed by: STUDENT IN AN ORGANIZED HEALTH CARE EDUCATION/TRAINING PROGRAM

## 2025-01-19 PROCEDURE — 93005 ELECTROCARDIOGRAM TRACING: CPT | Performed by: EMERGENCY MEDICINE

## 2025-01-19 PROCEDURE — 80053 COMPREHEN METABOLIC PANEL: CPT | Performed by: EMERGENCY MEDICINE

## 2025-01-19 PROCEDURE — 83880 ASSAY OF NATRIURETIC PEPTIDE: CPT | Performed by: STUDENT IN AN ORGANIZED HEALTH CARE EDUCATION/TRAINING PROGRAM

## 2025-01-19 PROCEDURE — 70450 CT HEAD/BRAIN W/O DYE: CPT

## 2025-01-19 PROCEDURE — 83050 HGB METHEMOGLOBIN QUAN: CPT | Performed by: STUDENT IN AN ORGANIZED HEALTH CARE EDUCATION/TRAINING PROGRAM

## 2025-01-19 PROCEDURE — 84100 ASSAY OF PHOSPHORUS: CPT | Performed by: STUDENT IN AN ORGANIZED HEALTH CARE EDUCATION/TRAINING PROGRAM

## 2025-01-19 PROCEDURE — 93010 ELECTROCARDIOGRAM REPORT: CPT | Performed by: EMERGENCY MEDICINE

## 2025-01-19 PROCEDURE — 82803 BLOOD GASES ANY COMBINATION: CPT | Performed by: STUDENT IN AN ORGANIZED HEALTH CARE EDUCATION/TRAINING PROGRAM

## 2025-01-19 PROCEDURE — 87636 SARSCOV2 & INF A&B AMP PRB: CPT | Performed by: EMERGENCY MEDICINE

## 2025-01-19 PROCEDURE — 83605 ASSAY OF LACTIC ACID: CPT | Performed by: EMERGENCY MEDICINE

## 2025-01-19 PROCEDURE — 25010000002 DIPHENHYDRAMINE PER 50 MG: Performed by: EMERGENCY MEDICINE

## 2025-01-19 RX ORDER — BISACODYL 5 MG/1
5 TABLET, DELAYED RELEASE ORAL DAILY PRN
Status: DISCONTINUED | OUTPATIENT
Start: 2025-01-19 | End: 2025-01-21 | Stop reason: HOSPADM

## 2025-01-19 RX ORDER — DIPHENHYDRAMINE HYDROCHLORIDE 50 MG/ML
50 INJECTION INTRAMUSCULAR; INTRAVENOUS ONCE
Status: COMPLETED | OUTPATIENT
Start: 2025-01-19 | End: 2025-01-19

## 2025-01-19 RX ORDER — SODIUM CHLORIDE 9 MG/ML
40 INJECTION, SOLUTION INTRAVENOUS AS NEEDED
Status: DISCONTINUED | OUTPATIENT
Start: 2025-01-19 | End: 2025-01-21 | Stop reason: HOSPADM

## 2025-01-19 RX ORDER — NITROGLYCERIN 0.4 MG/1
0.4 TABLET SUBLINGUAL
Status: DISCONTINUED | OUTPATIENT
Start: 2025-01-19 | End: 2025-01-21 | Stop reason: HOSPADM

## 2025-01-19 RX ORDER — IPRATROPIUM BROMIDE AND ALBUTEROL SULFATE 2.5; .5 MG/3ML; MG/3ML
3 SOLUTION RESPIRATORY (INHALATION) ONCE
Status: COMPLETED | OUTPATIENT
Start: 2025-01-19 | End: 2025-01-19

## 2025-01-19 RX ORDER — BREXPIPRAZOLE 2 MG/1
2 TABLET ORAL DAILY
COMMUNITY
End: 2025-01-21 | Stop reason: HOSPADM

## 2025-01-19 RX ORDER — HYDROCODONE BITARTRATE AND ACETAMINOPHEN 5; 325 MG/1; MG/1
1 TABLET ORAL ONCE AS NEEDED
Status: DISCONTINUED | OUTPATIENT
Start: 2025-01-19 | End: 2025-01-20

## 2025-01-19 RX ORDER — POLYETHYLENE GLYCOL 3350 17 G/17G
17 POWDER, FOR SOLUTION ORAL DAILY PRN
Status: DISCONTINUED | OUTPATIENT
Start: 2025-01-19 | End: 2025-01-21 | Stop reason: HOSPADM

## 2025-01-19 RX ORDER — LAMOTRIGINE 25 MG/1
25 TABLET ORAL 2 TIMES DAILY
COMMUNITY

## 2025-01-19 RX ORDER — ERGOCALCIFEROL 1.25 MG/1
50000 CAPSULE, LIQUID FILLED ORAL WEEKLY
COMMUNITY

## 2025-01-19 RX ORDER — METHOCARBAMOL 750 MG/1
750 TABLET, FILM COATED ORAL 3 TIMES DAILY
COMMUNITY

## 2025-01-19 RX ORDER — GABAPENTIN 100 MG/1
100 CAPSULE ORAL 3 TIMES DAILY
COMMUNITY

## 2025-01-19 RX ORDER — IOPAMIDOL 755 MG/ML
100 INJECTION, SOLUTION INTRAVASCULAR
Status: COMPLETED | OUTPATIENT
Start: 2025-01-19 | End: 2025-01-19

## 2025-01-19 RX ORDER — ASCORBIC ACID 500 MG
1000 TABLET ORAL DAILY
Status: DISCONTINUED | OUTPATIENT
Start: 2025-01-19 | End: 2025-01-21 | Stop reason: HOSPADM

## 2025-01-19 RX ORDER — SODIUM CHLORIDE 0.9 % (FLUSH) 0.9 %
10 SYRINGE (ML) INJECTION AS NEEDED
Status: DISCONTINUED | OUTPATIENT
Start: 2025-01-19 | End: 2025-01-21 | Stop reason: HOSPADM

## 2025-01-19 RX ORDER — AMOXICILLIN 250 MG
2 CAPSULE ORAL 2 TIMES DAILY PRN
Status: DISCONTINUED | OUTPATIENT
Start: 2025-01-19 | End: 2025-01-21 | Stop reason: HOSPADM

## 2025-01-19 RX ORDER — BISACODYL 10 MG
10 SUPPOSITORY, RECTAL RECTAL DAILY PRN
Status: DISCONTINUED | OUTPATIENT
Start: 2025-01-19 | End: 2025-01-21 | Stop reason: HOSPADM

## 2025-01-19 RX ORDER — SODIUM CHLORIDE 0.9 % (FLUSH) 0.9 %
10 SYRINGE (ML) INJECTION EVERY 12 HOURS SCHEDULED
Status: DISCONTINUED | OUTPATIENT
Start: 2025-01-19 | End: 2025-01-21 | Stop reason: HOSPADM

## 2025-01-19 RX ORDER — ACETAMINOPHEN 500 MG
1000 TABLET ORAL ONCE
Status: COMPLETED | OUTPATIENT
Start: 2025-01-19 | End: 2025-01-19

## 2025-01-19 RX ORDER — HEPARIN SODIUM 5000 [USP'U]/ML
5000 INJECTION, SOLUTION INTRAVENOUS; SUBCUTANEOUS EVERY 8 HOURS SCHEDULED
Status: DISCONTINUED | OUTPATIENT
Start: 2025-01-20 | End: 2025-01-21 | Stop reason: HOSPADM

## 2025-01-19 RX ADMIN — Medication 10 ML: at 23:28

## 2025-01-19 RX ADMIN — IOPAMIDOL 100 ML: 755 INJECTION, SOLUTION INTRAVENOUS at 17:24

## 2025-01-19 RX ADMIN — OXYCODONE HYDROCHLORIDE AND ACETAMINOPHEN 1000 MG: 500 TABLET ORAL at 23:16

## 2025-01-19 RX ADMIN — IPRATROPIUM BROMIDE AND ALBUTEROL SULFATE 3 ML: .5; 3 SOLUTION RESPIRATORY (INHALATION) at 17:51

## 2025-01-19 RX ADMIN — ACETAMINOPHEN 1000 MG: 500 TABLET, FILM COATED ORAL at 16:37

## 2025-01-19 RX ADMIN — HYDROCODONE BITARTRATE AND ACETAMINOPHEN 1 TABLET: 5; 325 TABLET ORAL at 18:08

## 2025-01-19 RX ADMIN — DIPHENHYDRAMINE HYDROCHLORIDE 50 MG: 50 INJECTION, SOLUTION INTRAMUSCULAR; INTRAVENOUS at 16:38

## 2025-01-19 NOTE — ED NOTES
Did an ambulation study on her way to CT scan. O2 dropped to 78% and increased to 85-87% when she sat down on the CT table. Patient then put on 6L of O2 during CT scan. Patient walked back to room with no O2 and O2 dropped to 83%. Notified Dr Taylor. Patient now back on O2 satting 95%.

## 2025-01-19 NOTE — [facility] PROVIDER NOTE
Subjective   History of Present Illness  Patient with complaint of diffuse headache, tremors after starting two new medications. Patient received Mounjaro injection yesterday for weight loss and just started new antidepressant, Rexulti 1 day ago. Symptoms all started this morning. No pattern to symptoms. No precipitating or rleieving factors. No vomiting, diarrhea. No rash. No other complaints.     History provided by:  Patient and friend   used: No        Review of Systems   All other systems reviewed and are negative.      Past Medical History:   Diagnosis Date    Asthma     Kidney stone        Allergies   Allergen Reactions    Penicillins Hives     Beta lactam allergy details  Antibiotic reaction: other (throat closed)  Age at reaction: infant  Dose to reaction time: (!) hours  Reason for antibiotic: unknown  Epinephrine required for reaction?: unknown  Tolerated antibiotics: amoxicillin, cephalexin           Past Surgical History:   Procedure Laterality Date     SECTION      CHOLECYSTECTOMY      DILATION AND CURETTAGE, DIAGNOSTIC / THERAPEUTIC      EXCISION LESION N/A 2024    Procedure: EXCISION of cyst from psterior neck, prone position;  Surgeon: Raul Lawson MD;  Location: [facility];  Service: General;  Laterality: N/A;    URETERAL STENT INSERTION         History reviewed. No pertinent family history.    Social History     Socioeconomic History    Marital status: Single   Tobacco Use    Smoking status: Every Day     Current packs/day: 1.00     Average packs/day: 1 pack/day for 12.0 years (12.0 ttl pk-yrs)     Types: Cigarettes     Start date:      Passive exposure: Current    Smokeless tobacco: Never   Vaping Use    Vaping status: Never Used   Substance and Sexual Activity    Alcohol use: No    Drug use: Never    Sexual activity: Defer           Objective   Physical Exam  Vitals and nursing note reviewed.   Constitutional:       Appearance: Normal  appearance.   HENT:      Mouth/Throat:      Mouth: Mucous membranes are moist.   Cardiovascular:      Rate and Rhythm: Normal rate and regular rhythm.   Pulmonary:      Effort: Pulmonary effort is normal.      Breath sounds: Normal breath sounds.   Musculoskeletal:         General: Normal range of motion.      Cervical back: Normal range of motion and neck supple.   Skin:     General: Skin is warm and dry.      Capillary Refill: Capillary refill takes less than 2 seconds.   Neurological:      General: No focal deficit present.      Mental Status: She is alert.   Psychiatric:      Comments: Anxious mood         ECG 12 Lead      Date/Time: 1/19/2025 4:32 PM    Performed by: Jack Taylor MD  Authorized by: Jack Taylor MD  Interpreted by ED physician  Comparison: not compared with previous ECG   Previous ECG: no previous ECG available  Rhythm: sinus rhythm  Rate: normal  BPM: 92  QRS axis: normal  Conduction: conduction normal  ST Segments: ST segments normal  T Waves: T waves normal  Other: no other findings  Clinical impression: normal ECG               ED Course                                           Medical Decision Making  Concern for adverse drug effect, metabolic derangement, hypoxia. D/w patient and family. Agree with plan.    Problems Addressed:  Adverse effect of drug, initial encounter: complicated acute illness or injury  Hypoxia: complicated acute illness or injury    Amount and/or Complexity of Data Reviewed  Labs: ordered.  Radiology: ordered.  ECG/medicine tests: ordered and independent interpretation performed.  Discussion of management or test interpretation with external provider(s): Patient persistent hypoxia, pulse ox 75% on RA. Increases to 95% on 4 Liters.  Concern for possible acquired blood dyscrasias such as methemoglobinemia. Will admit for further evaluation. All other testing currently unremarkable. D/w hospitalist.     Risk  OTC drugs.  Prescription drug management.  Decision  regarding hospitalization.        Final diagnoses:   Adverse effect of drug, initial encounter   Hypoxia   Generalized headache       ED Disposition  ED Disposition       ED Disposition   Decision to Admit    Condition   --    Comment   Level of Care: Telemetry [5]   Admitting Physician: MALOU MENA [[id number]]   Attending Physician: MALOU MENA [[id number]]   Patient Class: Inpatient [101]                 No follow-up provider specified.       Medication List      No changes were made to your prescriptions during this visit.

## 2025-01-20 ENCOUNTER — APPOINTMENT (OUTPATIENT)
Dept: CARDIOLOGY | Facility: HOSPITAL | Age: 35
End: 2025-01-20
Payer: MEDICAID

## 2025-01-20 PROBLEM — J20.6 ACUTE BRONCHITIS DUE TO RHINOVIRUS: Status: ACTIVE | Noted: 2025-01-20

## 2025-01-20 LAB
AMPHET+METHAMPHET UR QL: POSITIVE
AMPHETAMINES UR QL: NEGATIVE
ARTERIAL PATENCY WRIST A: POSITIVE
ARTERIAL PATENCY WRIST A: POSITIVE
ATMOSPHERIC PRESS: ABNORMAL MM[HG]
ATMOSPHERIC PRESS: ABNORMAL MM[HG]
BARBITURATES UR QL SCN: NEGATIVE
BASE EXCESS BLDA CALC-SCNC: 10.2 MMOL/L (ref 0–3)
BASE EXCESS BLDA CALC-SCNC: 3.8 MMOL/L (ref 0–3)
BDY SITE: ABNORMAL
BDY SITE: ABNORMAL
BENZODIAZ UR QL SCN: POSITIVE
BUPRENORPHINE SERPL-MCNC: NEGATIVE NG/ML
CANNABINOIDS SERPL QL: NEGATIVE
CO2 BLDA-SCNC: 32.2 MMOL/L (ref 22–29)
CO2 BLDA-SCNC: 40.7 MMOL/L (ref 22–29)
COCAINE UR QL: NEGATIVE
HCO3 BLDA-SCNC: 30.5 MMOL/L (ref 21–28)
HCO3 BLDA-SCNC: 38.6 MMOL/L (ref 21–28)
HEMODILUTION: NO
HEMODILUTION: NO
INHALED O2 CONCENTRATION: 30 %
INHALED O2 CONCENTRATION: 30 %
Lab: ABNORMAL
METHADONE UR QL SCN: POSITIVE
MODALITY: ABNORMAL
MODALITY: ABNORMAL
NOTIFIED WHO: ABNORMAL
OPIATES UR QL: POSITIVE
OXYCODONE UR QL SCN: NEGATIVE
PCO2 BLDA: 55 MM HG (ref 35–48)
PCO2 BLDA: 70.1 MM HG (ref 35–48)
PCP UR QL SCN: NEGATIVE
PH BLDA: 7.35 PH UNITS (ref 7.35–7.45)
PH BLDA: 7.35 PH UNITS (ref 7.35–7.45)
PO2 BLD: 254 MM[HG] (ref 0–500)
PO2 BLD: 280 MM[HG] (ref 0–500)
PO2 BLDA: 76.3 MM HG (ref 83–108)
PO2 BLDA: 84 MM HG (ref 83–108)
PSV: 9 CMH2O
PSV: 9 CMH2O
READ BACK: ABNORMAL
RESPIRATORY RATE: 20
RESPIRATORY RATE: 20
SAO2 % BLDCOA: 93.6 % (ref 94–98)
SAO2 % BLDCOA: 95.5 % (ref 94–98)
TRICYCLICS UR QL SCN: NEGATIVE

## 2025-01-20 PROCEDURE — 25010000002 HEPARIN (PORCINE) PER 1000 UNITS: Performed by: STUDENT IN AN ORGANIZED HEALTH CARE EDUCATION/TRAINING PROGRAM

## 2025-01-20 PROCEDURE — 25010000002 METHYLPREDNISOLONE PER 40 MG: Performed by: STUDENT IN AN ORGANIZED HEALTH CARE EDUCATION/TRAINING PROGRAM

## 2025-01-20 PROCEDURE — 25010000002 ONDANSETRON PER 1 MG: Performed by: STUDENT IN AN ORGANIZED HEALTH CARE EDUCATION/TRAINING PROGRAM

## 2025-01-20 PROCEDURE — 94799 UNLISTED PULMONARY SVC/PX: CPT

## 2025-01-20 PROCEDURE — 93306 TTE W/DOPPLER COMPLETE: CPT

## 2025-01-20 PROCEDURE — 36600 WITHDRAWAL OF ARTERIAL BLOOD: CPT

## 2025-01-20 PROCEDURE — 94664 DEMO&/EVAL PT USE INHALER: CPT

## 2025-01-20 PROCEDURE — 82803 BLOOD GASES ANY COMBINATION: CPT | Performed by: STUDENT IN AN ORGANIZED HEALTH CARE EDUCATION/TRAINING PROGRAM

## 2025-01-20 PROCEDURE — 25010000002 SULFUR HEXAFLUORIDE MICROSPH 60.7-25 MG RECONSTITUTED SUSPENSION: Performed by: STUDENT IN AN ORGANIZED HEALTH CARE EDUCATION/TRAINING PROGRAM

## 2025-01-20 PROCEDURE — 36600 WITHDRAWAL OF ARTERIAL BLOOD: CPT | Performed by: STUDENT IN AN ORGANIZED HEALTH CARE EDUCATION/TRAINING PROGRAM

## 2025-01-20 PROCEDURE — 93306 TTE W/DOPPLER COMPLETE: CPT | Performed by: INTERNAL MEDICINE

## 2025-01-20 PROCEDURE — 25010000002 HYDROMORPHONE PER 4 MG: Performed by: STUDENT IN AN ORGANIZED HEALTH CARE EDUCATION/TRAINING PROGRAM

## 2025-01-20 PROCEDURE — 82803 BLOOD GASES ANY COMBINATION: CPT

## 2025-01-20 PROCEDURE — 94660 CPAP INITIATION&MGMT: CPT

## 2025-01-20 PROCEDURE — 80306 DRUG TEST PRSMV INSTRMNT: CPT | Performed by: EMERGENCY MEDICINE

## 2025-01-20 PROCEDURE — 94761 N-INVAS EAR/PLS OXIMETRY MLT: CPT

## 2025-01-20 PROCEDURE — 25010000002 LORAZEPAM PER 2 MG: Performed by: STUDENT IN AN ORGANIZED HEALTH CARE EDUCATION/TRAINING PROGRAM

## 2025-01-20 RX ORDER — GABAPENTIN 100 MG/1
100 CAPSULE ORAL 3 TIMES DAILY
Status: DISCONTINUED | OUTPATIENT
Start: 2025-01-20 | End: 2025-01-21 | Stop reason: HOSPADM

## 2025-01-20 RX ORDER — PREDNISONE 20 MG/1
40 TABLET ORAL
Status: DISCONTINUED | OUTPATIENT
Start: 2025-01-20 | End: 2025-01-20

## 2025-01-20 RX ORDER — METHADONE HYDROCHLORIDE 10 MG/ML
177 CONCENTRATE ORAL DAILY
Status: DISCONTINUED | OUTPATIENT
Start: 2025-01-20 | End: 2025-01-21 | Stop reason: HOSPADM

## 2025-01-20 RX ORDER — ACETAMINOPHEN 325 MG/1
650 TABLET ORAL EVERY 6 HOURS PRN
Status: DISCONTINUED | OUTPATIENT
Start: 2025-01-20 | End: 2025-01-21 | Stop reason: HOSPADM

## 2025-01-20 RX ORDER — CALCIUM CARBONATE 500 MG/1
2 TABLET, CHEWABLE ORAL 3 TIMES DAILY PRN
Status: DISCONTINUED | OUTPATIENT
Start: 2025-01-20 | End: 2025-01-21 | Stop reason: HOSPADM

## 2025-01-20 RX ORDER — ONDANSETRON 2 MG/ML
4 INJECTION INTRAMUSCULAR; INTRAVENOUS ONCE
Status: COMPLETED | OUTPATIENT
Start: 2025-01-20 | End: 2025-01-20

## 2025-01-20 RX ORDER — LAMOTRIGINE 25 MG/1
25 TABLET ORAL DAILY
Status: DISCONTINUED | OUTPATIENT
Start: 2025-01-20 | End: 2025-01-21 | Stop reason: HOSPADM

## 2025-01-20 RX ORDER — IPRATROPIUM BROMIDE AND ALBUTEROL SULFATE 2.5; .5 MG/3ML; MG/3ML
3 SOLUTION RESPIRATORY (INHALATION)
Status: DISCONTINUED | OUTPATIENT
Start: 2025-01-20 | End: 2025-01-21 | Stop reason: HOSPADM

## 2025-01-20 RX ORDER — METHADONE HYDROCHLORIDE 5 MG/1
177 TABLET ORAL DAILY
COMMUNITY

## 2025-01-20 RX ORDER — LORAZEPAM 2 MG/ML
0.5 INJECTION INTRAMUSCULAR ONCE
Status: COMPLETED | OUTPATIENT
Start: 2025-01-20 | End: 2025-01-20

## 2025-01-20 RX ORDER — GUAIFENESIN/DEXTROMETHORPHAN 100-10MG/5
5 SYRUP ORAL EVERY 4 HOURS PRN
Status: DISCONTINUED | OUTPATIENT
Start: 2025-01-20 | End: 2025-01-21 | Stop reason: HOSPADM

## 2025-01-20 RX ORDER — HYDROXYZINE HYDROCHLORIDE 25 MG/1
50 TABLET, FILM COATED ORAL EVERY 6 HOURS PRN
Status: DISCONTINUED | OUTPATIENT
Start: 2025-01-20 | End: 2025-01-21 | Stop reason: HOSPADM

## 2025-01-20 RX ORDER — PREDNISONE 20 MG/1
40 TABLET ORAL
Status: DISCONTINUED | OUTPATIENT
Start: 2025-01-21 | End: 2025-01-21 | Stop reason: HOSPADM

## 2025-01-20 RX ORDER — METHYLPREDNISOLONE SODIUM SUCCINATE 40 MG/ML
40 INJECTION, POWDER, LYOPHILIZED, FOR SOLUTION INTRAMUSCULAR; INTRAVENOUS EVERY 6 HOURS
Status: DISCONTINUED | OUTPATIENT
Start: 2025-01-20 | End: 2025-01-20

## 2025-01-20 RX ORDER — HYDROMORPHONE HYDROCHLORIDE 1 MG/ML
0.5 INJECTION, SOLUTION INTRAMUSCULAR; INTRAVENOUS; SUBCUTANEOUS ONCE
Status: COMPLETED | OUTPATIENT
Start: 2025-01-20 | End: 2025-01-20

## 2025-01-20 RX ADMIN — HYDROXYZINE HYDROCHLORIDE 50 MG: 25 TABLET, FILM COATED ORAL at 09:45

## 2025-01-20 RX ADMIN — IPRATROPIUM BROMIDE AND ALBUTEROL SULFATE 3 ML: .5; 3 SOLUTION RESPIRATORY (INHALATION) at 18:50

## 2025-01-20 RX ADMIN — HYDROMORPHONE HYDROCHLORIDE 0.5 MG: 1 INJECTION, SOLUTION INTRAMUSCULAR; INTRAVENOUS; SUBCUTANEOUS at 20:10

## 2025-01-20 RX ADMIN — ACETAMINOPHEN 650 MG: 325 TABLET, FILM COATED ORAL at 18:18

## 2025-01-20 RX ADMIN — LORAZEPAM 0.5 MG: 2 INJECTION INTRAMUSCULAR; INTRAVENOUS at 00:17

## 2025-01-20 RX ADMIN — HYDROCODONE BITARTRATE AND ACETAMINOPHEN 1 TABLET: 5; 325 TABLET ORAL at 07:30

## 2025-01-20 RX ADMIN — SULFUR HEXAFLUORIDE 3 ML: KIT at 20:19

## 2025-01-20 RX ADMIN — HEPARIN SODIUM 5000 UNITS: 5000 INJECTION INTRAVENOUS; SUBCUTANEOUS at 04:40

## 2025-01-20 RX ADMIN — ONDANSETRON 4 MG: 2 INJECTION INTRAMUSCULAR; INTRAVENOUS at 10:14

## 2025-01-20 RX ADMIN — FLUOXETINE 40 MG: 20 CAPSULE ORAL at 09:13

## 2025-01-20 RX ADMIN — IPRATROPIUM BROMIDE AND ALBUTEROL SULFATE 3 ML: .5; 3 SOLUTION RESPIRATORY (INHALATION) at 11:08

## 2025-01-20 RX ADMIN — GABAPENTIN 100 MG: 100 CAPSULE ORAL at 09:13

## 2025-01-20 RX ADMIN — Medication 10 ML: at 20:10

## 2025-01-20 RX ADMIN — METHADONE HYDROCHLORIDE 177 MG: 10 CONCENTRATE ORAL at 09:13

## 2025-01-20 RX ADMIN — GABAPENTIN 100 MG: 100 CAPSULE ORAL at 20:10

## 2025-01-20 RX ADMIN — HEPARIN SODIUM 5000 UNITS: 5000 INJECTION INTRAVENOUS; SUBCUTANEOUS at 14:33

## 2025-01-20 RX ADMIN — IPRATROPIUM BROMIDE AND ALBUTEROL SULFATE 3 ML: .5; 3 SOLUTION RESPIRATORY (INHALATION) at 15:45

## 2025-01-20 RX ADMIN — HYDROXYZINE HYDROCHLORIDE 50 MG: 25 TABLET, FILM COATED ORAL at 20:10

## 2025-01-20 RX ADMIN — IPRATROPIUM BROMIDE AND ALBUTEROL SULFATE 3 ML: .5; 3 SOLUTION RESPIRATORY (INHALATION) at 08:16

## 2025-01-20 RX ADMIN — GABAPENTIN 100 MG: 100 CAPSULE ORAL at 15:55

## 2025-01-20 RX ADMIN — METHYLPREDNISOLONE SODIUM SUCCINATE 40 MG: 40 INJECTION, POWDER, FOR SOLUTION INTRAMUSCULAR; INTRAVENOUS at 04:38

## 2025-01-20 RX ADMIN — GUAIFENESIN AND DEXTROMETHORPHAN 5 ML: 100; 10 SYRUP ORAL at 20:10

## 2025-01-20 RX ADMIN — LAMOTRIGINE 25 MG: 25 TABLET ORAL at 09:13

## 2025-01-20 RX ADMIN — Medication 10 ML: at 09:13

## 2025-01-20 RX ADMIN — METHYLPREDNISOLONE SODIUM SUCCINATE 40 MG: 40 INJECTION, POWDER, FOR SOLUTION INTRAMUSCULAR; INTRAVENOUS at 09:13

## 2025-01-20 RX ADMIN — HEPARIN SODIUM 5000 UNITS: 5000 INJECTION INTRAVENOUS; SUBCUTANEOUS at 20:09

## 2025-01-20 RX ADMIN — CALCIUM CARBONATE 2 TABLET: 500 TABLET, CHEWABLE ORAL at 10:14

## 2025-01-20 NOTE — PAYOR COMM NOTE
"This is inpatient admit submission for Jacqueline Turcios.    IP admit on 1/19/25.    AUTHORIZATION PENDING:   PLEASE FAX OR CALL DETERMINATION TO CONTACT BELOW:   THANK YOU.      Bridgette Jarrett RN, CCM  Utilization Nurse  [facility]   [address] [facility] [place] [address]   [phone]  Fx [phone]     Jacqueline Turcios (34 y.o. Female)       Date of Birth   1990    Social Security Number       Address   [address] [place] [address] [place] [address]    Home Phone   [phone]    MRN   [id number]       Sikhism   Religious    Marital Status   Single                            Admission Date   1/19/25    Admission Type   Urgent    Admitting Provider   Llanes Alvarez, Carlos, MD    Attending Provider   Sita East MD    Department, Room/Bed   [facility] 2F, 2216/1       Discharge Date       Discharge Disposition       Discharge Destination                                 Attending Provider: Sita East MD    Allergies: Penicillins    Isolation: Droplet   Infection: Rhinovirus  (01/19/25)   Code Status: CPR    Ht: 170.2 cm (67\")   Wt: 154 kg (340 lb 2.7 oz)    Admission Cmt: None   Principal Problem: Acute hypoxic respiratory failure [J96.01]                   Active Insurance as of 1/19/2025       Primary Coverage       Payor Plan Insurance Group Employer/Plan Group    [id number]       Payor Plan Address Payor Plan Phone Number Payor Plan Fax Number Effective Dates    MAIL STOP:   10/1/2022 - None Entered    [address] [address]       [facility] [address]         Subscriber Name Subscriber Birth Date Member ID       JACQUELINE TURCIOS 1990 [id number]                     Emergency Contacts        (Rel.) Home Phone Work Phone Mobile Phone    BC RILEY (Mother) [phone] -- [phone]    PRITI JAMES (Significant Other) -- -- [phone]                 History & Physical        Llanes Alvarez, Carlos, MD at " "25 [phone]              [facility]  History & Physical    Patient Name: Jacqueline Turcios  : 1990  MRN: [id number]  Primary Care Physician:  Provider, No Known  Date of admission: 2025  Date and Time of Service: 2025 at 2340    Subjective      Chief Complaint: \"headache, cough\"    History of Present Illness: Jacqueline Turcios is a 34 y.o. female with a PMH of MDD, Morbid Obesity, ongoing smoking around 8 years PPD, Asthma, who presented to [facility] on 2025 with non productive cough, feeling weak all over, having diffuse headache over the past couple of days which she attributes to taking ozempic first time on Friday. Patient also reports having her home medication Rexulti dose doubled on Saturday, took only one tablet yesterday. Was initially seen at [facility] ED where she was found hypoxic SPO2 77 % and require 4 L NC. There was also some concern for Methemoglobinemia ? Prior to transfer. Patient denies SOB, chest pain, orthopnea, fever, chills. CT chest prior to transfer was negative for PE or acute cardiopulmonary process.       Review of Systems   Constitutional:  Positive for fatigue. Negative for chills.   Eyes:  Positive for visual disturbance.   Respiratory:  Positive for cough. Negative for shortness of breath.    Cardiovascular:  Negative for leg swelling.   Gastrointestinal:  Negative for abdominal pain.   Genitourinary:  Negative for dysuria.   Neurological:  Positive for headaches.       Personal History     Past Medical History:   Diagnosis Date    Asthma     Kidney stone        Past Surgical History:   Procedure Laterality Date     SECTION      CHOLECYSTECTOMY      DILATION AND CURETTAGE, DIAGNOSTIC / THERAPEUTIC      EXCISION LESION N/A 2024    Procedure: EXCISION of cyst from psterior neck, prone position;  Surgeon: Raul Lawson MD;  Location: [facility] OR;  Service: General;  Laterality: N/A;    URETERAL STENT " INSERTION         Family History: family history is not on file. Otherwise pertinent FHx was reviewed and not pertinent to current issue.    Social History:  reports that she has been smoking cigarettes. She started smoking about 12 years ago. She has a 12.1 pack-year smoking history. She has been exposed to tobacco smoke. She has never used smokeless tobacco. She reports that she does not drink alcohol and does not use drugs.    Home Medications:  Prior to Admission Medications       Prescriptions Last Dose Informant Patient Reported? Taking?    hydrOXYzine (ATARAX) 25 MG tablet   No No    Take 2 tablets by mouth Every 6 (Six) Hours As Needed for Anxiety.    naloxone (NARCAN) 4 MG/0.1ML nasal spray   No No    Call 911. Don't prime. [place] in 1 nostril for overdose. Repeat in 2-3 minutes in other nostril if no or minimal breathing/responsiveness.              Allergies:  Allergies   Allergen Reactions    Penicillins Hives     Beta lactam allergy details  Antibiotic reaction: other (throat closed)  Age at reaction: infant  Dose to reaction time: (!) hours  Reason for antibiotic: unknown  Epinephrine required for reaction?: unknown  Tolerated antibiotics: amoxicillin, cephalexin           Objective      Vitals:   Temp:  [98.2 °F (36.8 °C)-98.8 °F (37.1 °C)] 98.2 °F (36.8 °C)  Heart Rate:  [82-99] 82  Resp:  [18-22] 22  BP: (103-142)/(56-80) 115/76  Body mass index is 53.28 kg/m².  Physical Exam  Constitutional:       Appearance: She is obese. She is not ill-appearing.   HENT:      Head: Normocephalic.      Nose: Nose normal.      Mouth/Throat:      Mouth: Mucous membranes are dry.   Eyes:      Pupils: Pupils are equal, round, and reactive to light.   Cardiovascular:      Rate and Rhythm: Normal rate and regular rhythm.      Pulses: Normal pulses.   Pulmonary:      Effort: Pulmonary effort is normal. No respiratory distress.      Breath sounds: No wheezing.      Comments: Bilateral coarse breath sounds.   Abdominal:       General: Abdomen is flat.      Palpations: Abdomen is soft.   Musculoskeletal:         General: Normal range of motion.      Cervical back: Neck supple.   Skin:     General: Skin is dry.      Capillary Refill: Capillary refill takes less than 2 seconds.   Neurological:      General: No focal deficit present.      Mental Status: She is oriented to person, place, and time.   Psychiatric:         Behavior: Behavior normal.         Diagnostic Data:  Lab Results (last 24 hours)       Procedure Component Value Units Date/Time    Respiratory Panel PCR w/COVID-19(SARS-CoV-2) LOUIE/GIOVANNY/TOMMY/PAD/COR/JANET In-House, NP Swab in UTM/VTM, 2 HR TAT - Swab, Nasopharynx [[id number]]  (Abnormal) Collected: 01/19/25 2249    Specimen: Swab from Nasopharynx Updated: 01/19/25 2342     ADENOVIRUS, PCR Not Detected     Coronavirus 229E Not Detected     Coronavirus HKU1 Not Detected     Coronavirus NL63 Not Detected     Coronavirus OC43 Not Detected     COVID19 Not Detected     Human Metapneumovirus Not Detected     Human Rhinovirus/Enterovirus Detected     Influenza A PCR Not Detected     Influenza B PCR Not Detected     Parainfluenza Virus 1 Not Detected     Parainfluenza Virus 2 Not Detected     Parainfluenza Virus 3 Not Detected     Parainfluenza Virus 4 Not Detected     RSV, PCR Not Detected     Bordetella pertussis pcr Not Detected     Bordetella parapertussis PCR Not Detected     Chlamydophila pneumoniae PCR Not Detected     Mycoplasma pneumo by PCR Not Detected    Narrative:      In the setting of a positive respiratory panel with a viral infection PLUS a negative procalcitonin without other underlying concern for bacterial infection, consider observing off antibiotics or discontinuation of antibiotics and continue supportive care. If the respiratory panel is positive for atypical bacterial infection (Bordetella pertussis, Chlamydophila pneumoniae, or Mycoplasma pneumoniae), consider antibiotic de-escalation to target atypical bacterial  infection.    Comprehensive Metabolic Panel [[id number]]  (Abnormal) Collected: 01/19/25 2248    Specimen: Blood from Arm, Right Updated: 01/19/25 2314     Glucose 107 mg/dL      BUN 10 mg/dL      Creatinine 0.71 mg/dL      Sodium 136 mmol/L      Potassium 4.8 mmol/L      Comment: Slight hemolysis detected by analyzer. Result may be falsely elevated.        Chloride 97 mmol/L      CO2 33.5 mmol/L      Calcium 9.5 mg/dL      Total Protein 7.6 g/dL      Albumin 3.7 g/dL      ALT (SGPT) 43 U/L      AST (SGOT) 38 U/L      Comment: Slight hemolysis detected by analyzer. Result may be falsely elevated.        Alkaline Phosphatase 143 U/L      Total Bilirubin 0.2 mg/dL      Globulin 3.9 gm/dL      A/G Ratio 0.9 g/dL      BUN/Creatinine Ratio 14.1     Anion Gap 5.5 mmol/L      eGFR 114.6 mL/min/1.73     Narrative:      GFR Categories in Chronic Kidney Disease (CKD)      GFR Category          GFR (mL/min/1.73)    Interpretation  G1                     90 or greater         Normal or high (1)  G2                      60-89                Mild decrease (1)  G3a                   45-59                Mild to moderate decrease  G3b                   30-44                Moderate to severe decrease  G4                    15-29                Severe decrease  G5                    14 or less           Kidney failure          (1)In the absence of evidence of kidney disease, neither GFR category G1 or G2 fulfill the criteria for CKD.    eGFR calculation 2021 CKD-EPI creatinine equation, which does not include race as a factor    Magnesium [[id number]]  (Normal) Collected: 01/19/25 2248    Specimen: Blood from Arm, Right Updated: 01/19/25 2314     Magnesium 2.2 mg/dL     Phosphorus [[id number]]  (Normal) Collected: 01/19/25 2248    Specimen: Blood from Arm, Right Updated: 01/19/25 2313     Phosphorus 3.3 mg/dL     Methemoglobin [[id number]]  (Normal) Collected: 01/19/25 2248    Specimen: Blood from Arm, Right Updated: 01/19/25 2259      Methemoglobin <1.00 %     CBC Auto Differential [[id number]]  (Abnormal) Collected: 01/19/25 2248    Specimen: Blood from Arm, Right Updated: 01/19/25 2255     WBC 9.59 10*3/mm3      RBC 4.44 10*6/mm3      Hemoglobin 11.9 g/dL      Hematocrit 39.6 %      MCV 89.2 fL      MCH 26.8 pg      MCHC 30.1 g/dL      RDW 13.8 %      RDW-SD 45.2 fl      MPV 9.4 fL      Platelets 250 10*3/mm3      Neutrophil % 74.7 %      Lymphocyte % 16.4 %      Monocyte % 8.0 %      Eosinophil % 0.2 %      Basophil % 0.1 %      Immature Grans % 0.6 %      Neutrophils, Absolute 7.16 10*3/mm3      Lymphocytes, Absolute 1.57 10*3/mm3      Monocytes, Absolute 0.77 10*3/mm3      Eosinophils, Absolute 0.02 10*3/mm3      Basophils, Absolute 0.01 10*3/mm3      Immature Grans, Absolute 0.06 10*3/mm3      nRBC 0.0 /100 WBC     BNP [[id number]]  (Normal) Collected: 01/19/25 1630    Specimen: Blood Updated: 01/19/25 2228     proBNP 153.9 pg/mL     Narrative:      This assay is used as an aid in the diagnosis of individuals suspected of having heart failure. It can be used as an aid in the diagnosis of acute decompensated heart failure (ADHF) in patients presenting with signs and symptoms of ADHF to the emergency department (ED). In addition, NT-proBNP of <300 pg/mL indicates ADHF is not likely.    Age Range Result Interpretation  NT-proBNP Concentration (pg/mL:      <50             Positive            >450                   Gray                 300-450                    Negative             <300    50-75           Positive            >900                  Gray                300-900                  Negative            <300      >75             Positive            >1800                  Gray                300-1800                  Negative            <300    Blood Gas, Arterial - [[id number]]  (Abnormal) Collected: 01/19/25 2205    Specimen: Arterial Blood Updated: 01/19/25 2210     Site Left Radial     Shay's Test Positive     pH, Arterial 7.349 pH  units      pCO2, Arterial 67.6 mm Hg      pO2, Arterial 70.9 mm Hg      HCO3, Arterial 37.2 mmol/L      Base Excess, Arterial 9.0 mmol/L      Comment: Serial Number: [id number]:  [id number]        O2 Saturation, Arterial 92.3 %      CO2 Content 39.3 mmol/L      Barometric Pressure for Blood Gas --     Comment: N/A        Modality Cannula     FIO2 36 %      Hemodilution No     PO2/FIO2 197    Lactic Acid, Plasma [[id number]]  (Normal) Collected: 01/19/25 1630    Specimen: Blood Updated: 01/19/25 1713     Lactate 1.4 mmol/L     Comprehensive Metabolic Panel [[id number]]  (Abnormal) Collected: 01/19/25 1630    Specimen: Blood Updated: 01/19/25 1705     Glucose 107 mg/dL      BUN 11 mg/dL      Creatinine 0.74 mg/dL      Sodium 138 mmol/L      Potassium 5.0 mmol/L      Chloride 95 mmol/L      CO2 38.1 mmol/L      Calcium 9.5 mg/dL      Total Protein 7.5 g/dL      Albumin 3.9 g/dL      ALT (SGPT) 38 U/L      AST (SGOT) 25 U/L      Alkaline Phosphatase 157 U/L      Total Bilirubin 0.2 mg/dL      Globulin 3.6 gm/dL      A/G Ratio 1.1 g/dL      BUN/Creatinine Ratio 14.9     Anion Gap 4.9 mmol/L      eGFR 109.0 mL/min/1.73     Narrative:      GFR Categories in Chronic Kidney Disease (CKD)      GFR Category          GFR (mL/min/1.73)    Interpretation  G1                     90 or greater         Normal or high (1)  G2                      60-89                Mild decrease (1)  G3a                   45-59                Mild to moderate decrease  G3b                   30-44                Moderate to severe decrease  G4                    15-29                Severe decrease  G5                    14 or less           Kidney failure          (1)In the absence of evidence of kidney disease, neither GFR category G1 or G2 fulfill the criteria for CKD.    eGFR calculation 2021 CKD-EPI creatinine equation, which does not include race as a factor    COVID-19 and FLU A/B PCR, 1 HR TAT - Swab, Nasopharynx [[id number]]  (Normal)  "Collected: 01/19/25 1629    Specimen: Swab from Nasopharynx Updated: 01/19/25 1659     COVID19 Not Detected     Influenza A PCR Not Detected     Influenza B PCR Not Detected    Narrative:      Fact sheet for providers: https://www.fda.gov/media/505046/download    Fact sheet for patients: https://www.fda.gov/media/118146/download    Test performed by PCR.    D-dimer, Quantitative [[id number]]  (Normal) Collected: 01/19/25 1630    Specimen: Blood Updated: 01/19/25 1658     D-Dimer, Quantitative 0.34 MCGFEU/mL     Narrative:      According to the assay 's published package insert, a normal (<0.50 MCGFEU/mL) D-dimer result in conjunction with a non-high clinical probability assessment, excludes deep vein thrombosis (DVT) and pulmonary embolism (PE) with high sensitivity.    D-dimer values increase with age and this can make VTE exclusion of an older population difficult. To address this, the American College of Physicians, based on best available evidence and recent guidelines, recommends that clinicians use age-adjusted D-dimer thresholds in patients greater than 50 years of age with: a) a low probability of PE who do not meet all Pulmonary Embolism Rule Out Criteria, or b) in those with intermediate probability of PE.   The formula for an age-adjusted D-dimer cut-off is \"age/100\".  For example, a 60 year old patient would have an age-adjusted cut-off of 0.60 MCGFEU/mL and an 80 year old 0.80 MCGFEU/mL.    CBC & Differential [[id number]]  (Abnormal) Collected: 01/19/25 1630    Specimen: Blood Updated: 01/19/25 1643    Narrative:      The following orders were created for panel order CBC & Differential.  Procedure                               Abnormality         Status                     ---------                               -----------         ------                     CBC Auto Differential[[id number]]        Abnormal            Final result                 Please view results for these tests on the " individual orders.    CBC Auto Differential [[id number]]  (Abnormal) Collected: 01/19/25 1630    Specimen: Blood Updated: 01/19/25 1643     WBC 8.16 10*3/mm3      RBC 4.57 10*6/mm3      Hemoglobin 12.3 g/dL      Hematocrit 40.6 %      MCV 88.8 fL      MCH 26.9 pg      MCHC 30.3 g/dL      RDW 14.2 %      RDW-SD 46.7 fl      MPV 9.2 fL      Platelets 254 10*3/mm3      Neutrophil % 79.8 %      Lymphocyte % 13.0 %      Monocyte % 6.5 %      Eosinophil % 0.2 %      Basophil % 0.1 %      Immature Grans % 0.4 %      Neutrophils, Absolute 6.51 10*3/mm3      Lymphocytes, Absolute 1.06 10*3/mm3      Monocytes, Absolute 0.53 10*3/mm3      Eosinophils, Absolute 0.02 10*3/mm3      Basophils, Absolute 0.01 10*3/mm3      Immature Grans, Absolute 0.03 10*3/mm3              Imaging Results (Last 24 Hours)       Procedure Component Value Units Date/Time    CT Head Without Contrast [[id number]] Collected: 01/19/25 1831     Updated: 01/19/25 1835    Narrative:      CT HEAD WO CONTRAST    Date of Exam: 1/19/2025 6:15 PM EST    Indication: headache.    Comparison: None available.    Technique: Axial CT images were obtained of the head without contrast administration.  Coronal reconstructions were performed.  Automated exposure control and iterative reconstruction methods were used.      Findings:  No acute intracranial hemorrhage or extra-axial collection is identified. The ventricles appear normal in caliber, with no evidence of mass effect or midline shift. The basal cisterns appear patent. The gray-white differentiation appears preserved.    The calvarium appear intact. The paranasal sinuses are clear. The mastoid air cells are well-aerated. Intravascular contrast from a recent procedure is noted.      Impression:      Impression:  No acute intracranial process identified.      Electronically Signed: Brady Zhang MD    1/19/2025 6:32 PM EST    Workstation ID: [id number]    CT Angiogram Chest Pulmonary Embolism [[id number]] Collected:  01/19/25 1732     Updated: 01/19/25 1740    Narrative:      CT ANGIOGRAM CHEST PULMONARY EMBOLISM    Date of Exam: 1/19/2025 5:10 PM EST    Indication: hypoxia.    Comparison: Chest radiograph from earlier today    Technique: Axial CT images were obtained of the chest after the uneventful intravenous administration of iodinated contrast utilizing pulmonary embolism protocol.  In addition, a 3-D volume rendered image was created for interpretation.  Sagittal and   coronal reconstructions were performed.  Automated exposure control and iterative reconstruction methods were used.      Findings:  The central tracheobronchial tree is clear. The lungs are clear. There is no pleural effusion.    The heart size appears normal. The great vessels are normal in caliber. There is no evidence of pulmonary embolus. No abnormally enlarged lymph nodes are identified. Bilateral breast implants are present.    Partial evaluation of the upper abdomen demonstrates hepatic steatosis and changes of cholecystectomy.    No aggressive osseous lesions are identified.      Impression:      Impression:  1.Negative for pulmonary embolus.  2.No acute cardiopulmonary process.  3.Hepatic steatosis.        Electronically Signed: Brady Zhang MD    1/19/2025 5:38 PM EST    Workstation ID: [id number]    XR Chest 1 View [[id number]] Collected: 01/19/25 1648     Updated: 01/19/25 1651    Narrative:      XR CHEST 1 VW    Date of Exam: 1/19/2025 4:30 PM EST    Indication: shortness of breath    Comparison: Two-view chest x-ray 1/27/2017    Findings:  Lungs are adequately expanded and appear clear. No pneumothorax or large pleural effusion is seen. Cardiomediastinal contours appear stable.      Impression:      Impression:  No acute cardiopulmonary abnormality is identified.        Electronically Signed: Isabell Decker    1/19/2025 4:49 PM EST    Workstation ID: [id number]              Assessment & Plan        This is a 34 y.o. female with:    Active and  Resolved Problems  Active Hospital Problems    Diagnosis  POA    **Acute hypoxic respiratory failure [J96.01]  Yes    Acute bronchitis due to Rhinovirus [J20.6]  Unknown      Resolved Hospital Problems   No resolved problems to display.       Acute hypoxic and hypercapnic respiratory failure, probably multifactorial due to OHS, probably undiagnosed COPD given ongoing smoking, rhinovirus bronchitis.   Patient requiring 4 L NC prior to admission  Will place on BIPAP for now and repeat ABG in 2 hours  Start bronchodilators duo-nebs q 6 scheduled, q 4 PRN  Star IV steroids given suspicion for undiagnosed COPD  Smoking cessation counseling  Pulmonology consultation       Morbid Obesity  Recently started on ozempic outpatient  Continue lifestyle modifications      History of depression  Continue home medications, pending reconciliation    VTE Prophylaxis:  Pharmacologic VTE prophylaxis orders are present.        The patient desires to be as follows:    CODE STATUS:    Code Status (Patient has no pulse and is not breathing): CPR (Attempt to Resuscitate)  Medical Interventions (Patient has pulse or is breathing): Full Support        Jaymie Donnelly, who can be contacted at , is the designated person to make medical decisions on the patient's behalf if She is incapable of doing so. This was clarified with patient and/or next of kin on 1/19/2025 during the course of this H&P.    Admission Status:  I believe this patient meets inpatient status.    Expected Length of Stay: 2-3    PDMP and Medication Dispenses via Sidebar reviewed and consistent with patient reported medications.    I discussed the patient's findings and my recommendations with patient, family, and nursing staff.      Signature:     This document has been electronically signed by Carlos Llanes Alvarez, MD on January 20, 2025 01:37 [facility] Hospitalist Team     Electronically signed by Llanes Alvarez, Carlos, MD at 01/20/25 0139           Emergency Department Notes        Saundra Torres, RN at 25 183          Paged hospitalist for Wayne Vasquez PA will call back.     Electronically signed by Saundra Torres RN at 25       May Gonsales RN at 258          Did an ambulation study on her way to CT scan. O2 dropped to 78% and increased to 85-87% when she sat down on the CT table. Patient then put on 6L of O2 during CT scan. Patient walked back to room with no O2 and O2 dropped to 83%. Notified Dr Taylor. Patient now back on O2 satting 95%.    Electronically signed by May Gonsales RN at 25       Jack Taylor MD at 25 1627        Procedure Orders    1. ECG 12 Lead [[id number]] ordered by Jack Taylor MD                 Subjective   History of Present Illness  Patient with complaint of diffuse headache, tremors after starting two new medications. Patient received Mounjaro injection yesterday for weight loss and just started new antidepressant, Rexulti 1 day ago. Symptoms all started this morning. No pattern to symptoms. No precipitating or rleieving factors. No vomiting, diarrhea. No rash. No other complaints.     History provided by:  Patient and friend   used: No        Review of Systems   All other systems reviewed and are negative.      Past Medical History:   Diagnosis Date    Asthma     Kidney stone        Allergies   Allergen Reactions    Penicillins Hives     Beta lactam allergy details  Antibiotic reaction: other (throat closed)  Age at reaction: infant  Dose to reaction time: (!) hours  Reason for antibiotic: unknown  Epinephrine required for reaction?: unknown  Tolerated antibiotics: amoxicillin, cephalexin           Past Surgical History:   Procedure Laterality Date     SECTION      CHOLECYSTECTOMY      DILATION AND CURETTAGE, DIAGNOSTIC / THERAPEUTIC      EXCISION LESION N/A 2024    Procedure: EXCISION of cyst from psterior neck, prone  position;  Surgeon: Raul Lawson MD;  Location: [facility] MAIN OR;  Service: General;  Laterality: N/A;    URETERAL STENT INSERTION         History reviewed. No pertinent family history.    Social History     Socioeconomic History    Marital status: Single   Tobacco Use    Smoking status: Every Day     Current packs/day: 1.00     Average packs/day: 1 pack/day for 12.0 years (12.0 ttl pk-yrs)     Types: Cigarettes     Start date: 2013     Passive exposure: Current    Smokeless tobacco: Never   Vaping Use    Vaping status: Never Used   Substance and Sexual Activity    Alcohol use: No    Drug use: Never    Sexual activity: Defer           Objective   Physical Exam  Vitals and nursing note reviewed.   Constitutional:       Appearance: Normal appearance.   HENT:      Mouth/Throat:      Mouth: Mucous membranes are moist.   Cardiovascular:      Rate and Rhythm: Normal rate and regular rhythm.   Pulmonary:      Effort: Pulmonary effort is normal.      Breath sounds: Normal breath sounds.   Musculoskeletal:         General: Normal range of motion.      Cervical back: Normal range of motion and neck supple.   Skin:     General: Skin is warm and dry.      Capillary Refill: Capillary refill takes less than 2 seconds.   Neurological:      General: No focal deficit present.      Mental Status: She is alert.   Psychiatric:      Comments: Anxious mood         ECG 12 Lead      Date/Time: 1/19/2025 4:32 PM    Performed by: Jack Taylor MD  Authorized by: Jack Taylor MD  Interpreted by ED physician  Comparison: not compared with previous ECG   Previous ECG: no previous ECG available  Rhythm: sinus rhythm  Rate: normal  BPM: 92  QRS axis: normal  Conduction: conduction normal  ST Segments: ST segments normal  T Waves: T waves normal  Other: no other findings  Clinical impression: normal ECG              ED Course                                           Medical Decision Making  Concern for adverse drug effect,  metabolic derangement, hypoxia. D/w patient and family. Agree with plan.    Problems Addressed:  Adverse effect of drug, initial encounter: complicated acute illness or injury  Hypoxia: complicated acute illness or injury    Amount and/or Complexity of Data Reviewed  Labs: ordered.  Radiology: ordered.  ECG/medicine tests: ordered and independent interpretation performed.  Discussion of management or test interpretation with external provider(s): Patient persistent hypoxia, pulse ox 75% on RA. Increases to 95% on 4 Liters.  Concern for possible acquired blood dyscrasias such as methemoglobinemia. Will admit for further evaluation. All other testing currently unremarkable. D/w hospitalist.     Risk  OTC drugs.  Prescription drug management.  Decision regarding hospitalization.        Final diagnoses:   Adverse effect of drug, initial encounter   Hypoxia   Generalized headache       ED Disposition  ED Disposition       ED Disposition   Decision to Admit    Condition   --    Comment   Level of Care: Telemetry [5]   Admitting Physician: MALOU MENA [[id number]]   Attending Physician: MALOU MENA [[id number]]   Patient Class: Inpatient [101]                 No follow-up provider specified.       Medication List      No changes were made to your prescriptions during this visit.           Electronically signed by Jack Taylor MD at 01/19/25 1843       Operative/Procedure Notes (last 48 hours)  Notes from 01/18/25 1522 through 01/20/25 1522   No notes of this type exist for this encounter.       Physician Progress Notes (last 48 hours)  Notes from 01/18/25 1523 through 01/20/25 1523   No notes of this type exist for this encounter.          Consult Notes (last 48 hours)        Lyle Hayward MD at 01/20/25 0836        Consult Orders    1. Inpatient Pulmonology Consult [[id number]] ordered by Llanes Alvarez, Carlos, MD at 01/20/25 0121                 [facility]         CONSULT NOTE    Patient  Identification:  Jacqu[age] y.o.  female  1990  [id number]            Requesting physician: Attending physician    Reason for Consultation: ABG        History of Present Illness:    34-year-old female admitted on 2025 with nonproductive cough headaches after after starting two new medications. Mounjaro injection and antidepressant, Rexulti   O2 dropped to 78% ambulating to ct scan, and increased to 85-87% when she sat down on the CT table. Patient then put on 6L of O2 during CT scan.   Patient walked back to room with no O2 and O2 dropped to 83%       Assessment:    Acute/chronic hypercapnic/hypoxic resp insuff    Positive Rhino Virus    No PE on ct chest  No alveolar infiltrates    nonproductive cough headaches after starting two new medication:   Mounjaro injection and antidepressant, Rexulti     Hx of Asthma  Smoker 10 p/yr    LUIS ALBERTO    Recommendations:    O2 titartion    Prednison    Azithomycine    DuoNebs    Sleep study and PFT as outpt    2 D Echo r/o PAH          Review of Sytems:  Review of Systems   Respiratory:  Positive for cough and shortness of breath. Negative for wheezing and stridor.    Cardiovascular:  Positive for palpitations and leg swelling. Negative for chest pain.       Past Medical History:  Past Medical History:   Diagnosis Date    Asthma     Kidney stone        Past Surgical History:  Past Surgical History:   Procedure Laterality Date     SECTION      CHOLECYSTECTOMY      DILATION AND CURETTAGE, DIAGNOSTIC / THERAPEUTIC      EXCISION LESION N/A 2024    Procedure: EXCISION of cyst from psterior neck, prone position;  Surgeon: Raul Lawson MD;  Location: [facility] MAIN OR;  Service: General;  Laterality: N/A;    URETERAL STENT INSERTION          Home Meds:  Medications Prior to Admission   Medication Sig Dispense Refill Last Dose/Taking    Brexpiprazole (Rexulti) 2 MG tablet Take 1 tablet by mouth Daily.   2025    FLUoxetine (PROzac) 20 MG capsule  Take 2 capsules by mouth Daily.   1/18/2025    gabapentin (NEURONTIN) 100 MG capsule Take 1 capsule by mouth 3 (Three) Times a Day.   1/18/2025    hydrOXYzine (ATARAX) 25 MG tablet Take 2 tablets by mouth Every 6 (Six) Hours As Needed for Anxiety. 20 tablet 0 Past Week    lamoTRIgine (LaMICtal) 25 MG tablet Take 1 tablet by mouth Daily.   1/18/2025    methocarbamol (ROBAXIN) 750 MG tablet Take 1 tablet by mouth 3 (Three) Times a Day.   1/18/2025    methadone (DOLOPHINE) 5 MG tablet Take 177 mg by mouth Daily.       naloxone (NARCAN) 4 MG/0.1ML nasal spray Call 911. Don't prime. [place] in 1 nostril for overdose. Repeat in 2-3 minutes in other nostril if no or minimal breathing/responsiveness. 2 each 0 Unknown    Semaglutide,0.25 or 0.5MG/DOS, (OZEMPIC) 2 MG/1.5ML solution pen-injector Inject 0.25 mg under the skin into the appropriate area as directed 1 (One) Time Per Week.   1/17/2025    vitamin D (ERGOCALCIFEROL) 1.25 MG (90800 UT) capsule capsule Take 1 capsule by mouth 1 (One) Time Per Week.   1/16/2025       Allergies:  Allergies   Allergen Reactions    Penicillins Hives     Beta lactam allergy details  Antibiotic reaction: other (throat closed)  Age at reaction: infant  Dose to reaction time: (!) hours  Reason for antibiotic: unknown  Epinephrine required for reaction?: unknown  Tolerated antibiotics: amoxicillin, cephalexin           Social History:   Social History     Socioeconomic History    Marital status: Single   Tobacco Use    Smoking status: Every Day     Current packs/day: 1.00     Average packs/day: 1 pack/day for 12.1 years (12.1 ttl pk-yrs)     Types: Cigarettes     Start date: 2013     Passive exposure: Current    Smokeless tobacco: Never   Vaping Use    Vaping status: Never Used   Substance and Sexual Activity    Alcohol use: No    Drug use: Never    Sexual activity: Defer       Family History:  History reviewed. No pertinent family history.    Physical Exam:  /78 (BP Location: Right arm,  "Patient Position: Lying)   Pulse 87   Temp 97.8 °F (36.6 °C) (Oral)   Resp 16   Ht 170.2 cm (67\")   Wt (!) 154 kg (340 lb 2.7 oz)   LMP 12/19/2024 (Approximate)   SpO2 99%   BMI 53.28 kg/m²  Body mass index is 53.28 kg/m². 99% (!) 154 kg (340 lb 2.7 oz)  Physical Exam  Cardiovascular:      Heart sounds: No murmur heard.     No gallop.   Pulmonary:      Effort: No respiratory distress.      Breath sounds: No stridor. Rhonchi present. No wheezing or rales.   Chest:      Chest wall: No tenderness.         LABS:  Lab Results   Component Value Date    CALCIUM 9.5 01/19/2025    PHOS 3.3 01/19/2025     Results from last 7 days   Lab Units 01/19/25  2248 01/19/25  1630   MAGNESIUM mg/dL 2.2  --    SODIUM mmol/L 136 138   POTASSIUM mmol/L 4.8 5.0   CHLORIDE mmol/L 97* 95*   CO2 mmol/L 33.5* 38.1*   BUN mg/dL 10 11   CREATININE mg/dL 0.71 0.74   GLUCOSE mg/dL 107* 107*   CALCIUM mg/dL 9.5 9.5   WBC 10*3/mm3 9.59 8.16   HEMOGLOBIN g/dL 11.9* 12.3   PLATELETS 10*3/mm3 250 254   ALT (SGPT) U/L 43* 38*   AST (SGOT) U/L 38* 25   PROBNP pg/mL  --  153.9     Lab Results   Component Value Date    TROPONINI <0.010 09/14/2022             Results from last 7 days   Lab Units 01/19/25  1630   LACTATE mmol/L 1.4     Results from last 7 days   Lab Units 01/20/25  0305 01/20/25  0207 01/19/25  2205   PH, ARTERIAL pH units 7.352 7.348* 7.349*   PCO2, ARTERIAL mm Hg 55.0* 70.1* 67.6*   PO2 ART mm Hg 84.0 76.3* 70.9*   O2 SATURATION ART % 95.5 93.6* 92.3*   MODALITY  BiPap BiPap Cannula     Results from last 7 days   Lab Units 01/19/25 2249   ADENOVIRUS DETECTION BY PCR  Not Detected   CORONAVIRUS 229E  Not Detected   CORONAVIRUS HKU1  Not Detected   CORONAVIRUS NL63  Not Detected   CORONAVIRUS OC43  Not Detected   HUMAN METAPNEUMOVIRUS  Not Detected   HUMAN RHINOVIRUS/ENTEROVIRUS  Detected*   INFLUENZA B PCR  Not Detected   PARAINFLUENZA 1  Not Detected   PARAINFLUENZA VIRUS 2  Not Detected   PARAINFLUENZA VIRUS 3  Not Detected "   PARAINFLUENZA VIRUS 4  Not Detected   BORDETELLA PERTUSSIS PCR  Not Detected   CHLAMYDOPHILA PNEUMONIAE PCR  Not Detected   MYCOPLAMA PNEUMO PCR  Not Detected   INFLUENZA A PCR  Not Detected   RSV, PCR  Not Detected             Lab Results   Component Value Date    TSH 0.858 11/14/2024     Estimated Creatinine Clearance: 173.8 mL/min (by C-G formula based on SCr of 0.71 mg/dL).         Imaging:  Imaging Results (Last 24 Hours)       Procedure Component Value Units Date/Time    CT Head Without Contrast [[id number]] Collected: 01/19/25 1831     Updated: 01/19/25 1835    Narrative:      CT HEAD WO CONTRAST    Date of Exam: 1/19/2025 6:15 PM EST    Indication: headache.    Comparison: None available.    Technique: Axial CT images were obtained of the head without contrast administration.  Coronal reconstructions were performed.  Automated exposure control and iterative reconstruction methods were used.      Findings:  No acute intracranial hemorrhage or extra-axial collection is identified. The ventricles appear normal in caliber, with no evidence of mass effect or midline shift. The basal cisterns appear patent. The gray-white differentiation appears preserved.    The calvarium appear intact. The paranasal sinuses are clear. The mastoid air cells are well-aerated. Intravascular contrast from a recent procedure is noted.      Impression:      Impression:  No acute intracranial process identified.      Electronically Signed: Brady Zhang MD    1/19/2025 6:32 PM EST    Workstation ID: [id number]    CT Angiogram Chest Pulmonary Embolism [[id number]] Collected: 01/19/25 1732     Updated: 01/19/25 1740    Narrative:      CT ANGIOGRAM CHEST PULMONARY EMBOLISM    Date of Exam: 1/19/2025 5:10 PM EST    Indication: hypoxia.    Comparison: Chest radiograph from earlier today    Technique: Axial CT images were obtained of the chest after the uneventful intravenous administration of iodinated contrast utilizing pulmonary embolism  protocol.  In addition, a 3-D volume rendered image was created for interpretation.  Sagittal and   coronal reconstructions were performed.  Automated exposure control and iterative reconstruction methods were used.      Findings:  The central tracheobronchial tree is clear. The lungs are clear. There is no pleural effusion.    The heart size appears normal. The great vessels are normal in caliber. There is no evidence of pulmonary embolus. No abnormally enlarged lymph nodes are identified. Bilateral breast implants are present.    Partial evaluation of the upper abdomen demonstrates hepatic steatosis and changes of cholecystectomy.    No aggressive osseous lesions are identified.      Impression:      Impression:  1.Negative for pulmonary embolus.  2.No acute cardiopulmonary process.  3.Hepatic steatosis.        Electronically Signed: Brady Zhang MD    1/19/2025 5:38 PM EST    Workstation ID: [id number]    XR Chest 1 View [[id number]] Collected: 01/19/25 1648     Updated: 01/19/25 1651    Narrative:      XR CHEST 1 VW    Date of Exam: 1/19/2025 4:30 PM EST    Indication: shortness of breath    Comparison: Two-view chest x-ray 1/27/2017    Findings:  Lungs are adequately expanded and appear clear. No pneumothorax or large pleural effusion is seen. Cardiomediastinal contours appear stable.      Impression:      Impression:  No acute cardiopulmonary abnormality is identified.        Electronically Signed: Isabell Decker    1/19/2025 4:49 PM EST    Workstation ID: [id number]              Current Meds:   SCHEDULE  ascorbic acid, 1,000 mg, Oral, Daily  FLUoxetine, 40 mg, Oral, Daily  gabapentin, 100 mg, Oral, TID  heparin (porcine), 5,000 Units, Subcutaneous, Q8H  ipratropium-albuterol, 3 mL, Nebulization, 4x Daily - RT  lamoTRIgine, 25 mg, Oral, Daily  methylPREDNISolone sodium succinate, 40 mg, Intravenous, Q6H  sodium chloride, 10 mL, Intravenous, Q12H      Infusions     PRNs    senna-docusate sodium **AND**  polyethylene glycol **AND** bisacodyl **AND** bisacodyl    Calcium Replacement - Follow Nurse / BPA Driven Protocol    hydrOXYzine    Magnesium Standard Dose Replacement - Follow Nurse / BPA Driven Protocol    nitroglycerin    Phosphorus Replacement - Follow Nurse / BPA Driven Protocol    Potassium Replacement - Follow Nurse / BPA Driven Protocol    sodium chloride    sodium chloride        Lyle Hayward MD  1/20/2025  08:40 EST      Much of this encounter note is an electronic transcription/translation of spoken language to printed text using Dragon Software.    Electronically signed by Lyle Hayward MD at 01/20/25 1727

## 2025-01-20 NOTE — PROGRESS NOTES
Pt not able to tolerate v60 due to anxiety, RN talking to doctor to get anxiety medicine and will try to place on BIPAP again

## 2025-01-20 NOTE — CASE MANAGEMENT/SOCIAL WORK
Continued Stay Note  [facility]     Patient Name: Jacqueline Turcios  MRN: [id number]  Today's Date: 1/20/2025    Admit Date: 1/19/2025        Discharge Plan       Row Name 01/20/25 1017       Plan    Plan Comments DC Barriers: DC Barriers: Bipap, bronchodilators, IV steroids, pulm consult             Sheri Shields RN     [facility]  Office: [phone]  Cell: [phone]  Fax # [phone]

## 2025-01-20 NOTE — PROGRESS NOTES
[facility] MEDICINE SERVICE  DAILY PROGRESS NOTE    NAME: Jacqueline Turcios  : 1990  MRN: [id number]      LOS: 1 day     PROVIDER OF SERVICE: Sita East MD    Chief Complaint: Acute hypoxic respiratory failure    Subjective:     Interval History:    Patient seen and evaluated at bedside. Reports breathing is significantly improved today.     Treatment plan discussed with patient. All questions addressed.     Review of Systems:   Denies fevers, chills  Denies chest pain, edema  Denies shortness of breath, +cough  Denies nausea, vomiting, diarrhea  Denies dysuria, hematuria  +anxiety    Objective:     Vital Signs  Temp:  [97.8 °F (36.6 °C)-98.8 °F (37.1 °C)] 97.8 °F (36.6 °C)  Heart Rate:  [82-99] 93  Resp:  [12-22] 16  BP: (103-142)/(56-80) 119/78  Flow (L/min) (Oxygen Therapy):  [2-5] 2   Body mass index is 53.28 kg/m².    Physical Exam   General: No acute distress, alert and oriented, obese  CV: RRR, no peripheral edema  Pulm: Coarse breath sounds throughout, no increased work of breathing  Abd: Soft, nontender, nondistended  Skin: No rashes or lesions on exposed skin  Psych: Appropriate mood and affect    Scheduled Meds   ascorbic acid, 1,000 mg, Oral, Daily  heparin (porcine), 5,000 Units, Subcutaneous, Q8H  ipratropium-albuterol, 3 mL, Nebulization, 4x Daily - RT  methylPREDNISolone sodium succinate, 40 mg, Intravenous, Q6H  sodium chloride, 10 mL, Intravenous, Q12H       PRN Meds     senna-docusate sodium **AND** polyethylene glycol **AND** bisacodyl **AND** bisacodyl    Calcium Replacement - Follow Nurse / BPA Driven Protocol    HYDROcodone-acetaminophen    Magnesium Standard Dose Replacement - Follow Nurse / BPA Driven Protocol    nitroglycerin    Phosphorus Replacement - Follow Nurse / BPA Driven Protocol    Potassium Replacement - Follow Nurse / BPA Driven Protocol    sodium chloride    sodium chloride   Infusions         Diagnostic Data    Results from last 7 days   Lab Units  01/19/25  2248   WBC 10*3/mm3 9.59   HEMOGLOBIN g/dL 11.9*   HEMATOCRIT % 39.6   PLATELETS 10*3/mm3 250   GLUCOSE mg/dL 107*   CREATININE mg/dL 0.71   BUN mg/dL 10   SODIUM mmol/L 136   POTASSIUM mmol/L 4.8   AST (SGOT) U/L 38*   ALT (SGPT) U/L 43*   ALK PHOS U/L 143*   BILIRUBIN mg/dL 0.2   ANION GAP mmol/L 5.5       CT Head Without Contrast    Result Date: 1/19/2025  Impression: No acute intracranial process identified. Electronically Signed: Brady Zhang MD  1/19/2025 6:32 PM EST  Workstation ID: [id number]    CT Angiogram Chest Pulmonary Embolism    Result Date: 1/19/2025  Impression: 1.Negative for pulmonary embolus. 2.No acute cardiopulmonary process. 3.Hepatic steatosis. Electronically Signed: Brady Zhang MD  1/19/2025 5:38 PM EST  Workstation ID: [id number]    XR Chest 1 View    Result Date: 1/19/2025  Impression: No acute cardiopulmonary abnormality is identified. Electronically Signed: Isabell Decker  1/19/2025 4:49 PM EST  Workstation ID: [id number]     Interval results reviewed.    Assessment/Plan:     Acute hypoxic hypercapnic respiratory failure  Acute rhinovirus infection  Obesity hypoventilation syndrome, likely  Chronic obstructive pulmonary disease, likely  Tobacco use disorder  - Pulmonology consulted, appreciate recs  - Supplemental oxygen with BiPAP at night and while sleeping  - Transition to oral steroids with improvement in respiratory status  - Azithromycin in setting of likely COPD exacerbation  - Nebs, pulm toileting  - Smoking cessation counseling  - Echo pending  - PFTs and sleep study recommended as outpatient  - Wean oxygen as tolerated, may need walking oximetry prior to discharge    Depression  - Chronic, stable  - Continue prozac, lamictal, hydroxyzine    History of substance use  - Continue methadone    Treatment plan discussed with nursing staff, case management.     VTE Prophylaxis:  Pharmacologic VTE prophylaxis orders are present.    Code status is   Code Status and Medical  Interventions: CPR (Attempt to Resuscitate); Full Support   Ordered at: 01/19/25 2133     Code Status (Patient has no pulse and is not breathing):    CPR (Attempt to Resuscitate)     Medical Interventions (Patient has pulse or is breathing):    Full Support       Plan for disposition: Home 1/22/25    Barriers to discharge: Echo pending, respiratory status    Time: 35+ minutes     Signature: Electronically signed by Sita East MD, 01/20/25, 08:12 [facility].  [facility], [place], [facility]

## 2025-01-20 NOTE — H&P
"[facility] Medicine Services  History & Physical    Patient Name: Jacqueline Turcios  : 1990  MRN: [id number]  Primary Care Physician:  Provider, No Known  Date of admission: 2025  Date and Time of Service: 2025 at 2340    Subjective      Chief Complaint: \"headache, cough\"    History of Present Illness: Jacqueline Turcios is a 34 y.o. female with a PMH of MDD, Morbid Obesity, ongoing smoking around 8 years PPD, Asthma, who presented to [facility] on 2025 with non productive cough, feeling weak all over, having diffuse headache over the past couple of days which she attributes to taking ozempic first time on Friday. Patient also reports having her home medication Rexulti dose doubled on Saturday, took only one tablet yesterday. Was initially seen at [facility] ED where she was found hypoxic SPO2 77 % and require 4 L NC. There was also some concern for Methemoglobinemia ? Prior to transfer. Patient denies SOB, chest pain, orthopnea, fever, chills. CT chest prior to transfer was negative for PE or acute cardiopulmonary process.       Review of Systems   Constitutional:  Positive for fatigue. Negative for chills.   Eyes:  Positive for visual disturbance.   Respiratory:  Positive for cough. Negative for shortness of breath.    Cardiovascular:  Negative for leg swelling.   Gastrointestinal:  Negative for abdominal pain.   Genitourinary:  Negative for dysuria.   Neurological:  Positive for headaches.       Personal History     Past Medical History:   Diagnosis Date    Asthma     Kidney stone        Past Surgical History:   Procedure Laterality Date     SECTION      CHOLECYSTECTOMY      DILATION AND CURETTAGE, DIAGNOSTIC / THERAPEUTIC      EXCISION LESION N/A 2024    Procedure: EXCISION of cyst from psterior neck, prone position;  Surgeon: Raul Lawson MD;  Location: [facility] OR;  Service: General;  Laterality: N/A;    URETERAL STENT INSERTION         Family " History: family history is not on file. Otherwise pertinent FHx was reviewed and not pertinent to current issue.    Social History:  reports that she has been smoking cigarettes. She started smoking about 12 years ago. She has a 12.1 pack-year smoking history. She has been exposed to tobacco smoke. She has never used smokeless tobacco. She reports that she does not drink alcohol and does not use drugs.    Home Medications:  Prior to Admission Medications       Prescriptions Last Dose Informant Patient Reported? Taking?    hydrOXYzine (ATARAX) 25 MG tablet   No No    Take 2 tablets by mouth Every 6 (Six) Hours As Needed for Anxiety.    naloxone (NARCAN) 4 MG/0.1ML nasal spray   No No    Call 911. Don't prime. [place] in 1 nostril for overdose. Repeat in 2-3 minutes in other nostril if no or minimal breathing/responsiveness.              Allergies:  Allergies   Allergen Reactions    Penicillins Hives     Beta lactam allergy details  Antibiotic reaction: other (throat closed)  Age at reaction: infant  Dose to reaction time: (!) hours  Reason for antibiotic: unknown  Epinephrine required for reaction?: unknown  Tolerated antibiotics: amoxicillin, cephalexin           Objective      Vitals:   Temp:  [98.2 °F (36.8 °C)-98.8 °F (37.1 °C)] 98.2 °F (36.8 °C)  Heart Rate:  [82-99] 82  Resp:  [18-22] 22  BP: (103-142)/(56-80) 115/76  Body mass index is 53.28 kg/m².  Physical Exam  Constitutional:       Appearance: She is obese. She is not ill-appearing.   HENT:      Head: Normocephalic.      Nose: Nose normal.      Mouth/Throat:      Mouth: Mucous membranes are dry.   Eyes:      Pupils: Pupils are equal, round, and reactive to light.   Cardiovascular:      Rate and Rhythm: Normal rate and regular rhythm.      Pulses: Normal pulses.   Pulmonary:      Effort: Pulmonary effort is normal. No respiratory distress.      Breath sounds: No wheezing.      Comments: Bilateral coarse breath sounds.   Abdominal:      General: Abdomen is  flat.      Palpations: Abdomen is soft.   Musculoskeletal:         General: Normal range of motion.      Cervical back: Neck supple.   Skin:     General: Skin is dry.      Capillary Refill: Capillary refill takes less than 2 seconds.   Neurological:      General: No focal deficit present.      Mental Status: She is oriented to person, place, and time.   Psychiatric:         Behavior: Behavior normal.         Diagnostic Data:  Lab Results (last 24 hours)       Procedure Component Value Units Date/Time    Respiratory Panel PCR w/COVID-19(SARS-CoV-2) LOUIE/GIOVANNY/TOMMY/PAD/COR/JANET In-House, NP Swab in UTM/VTM, 2 HR TAT - Swab, Nasopharynx [[id number]]  (Abnormal) Collected: 01/19/25 2249    Specimen: Swab from Nasopharynx Updated: 01/19/25 2342     ADENOVIRUS, PCR Not Detected     Coronavirus 229E Not Detected     Coronavirus HKU1 Not Detected     Coronavirus NL63 Not Detected     Coronavirus OC43 Not Detected     COVID19 Not Detected     Human Metapneumovirus Not Detected     Human Rhinovirus/Enterovirus Detected     Influenza A PCR Not Detected     Influenza B PCR Not Detected     Parainfluenza Virus 1 Not Detected     Parainfluenza Virus 2 Not Detected     Parainfluenza Virus 3 Not Detected     Parainfluenza Virus 4 Not Detected     RSV, PCR Not Detected     Bordetella pertussis pcr Not Detected     Bordetella parapertussis PCR Not Detected     Chlamydophila pneumoniae PCR Not Detected     Mycoplasma pneumo by PCR Not Detected    Narrative:      In the setting of a positive respiratory panel with a viral infection PLUS a negative procalcitonin without other underlying concern for bacterial infection, consider observing off antibiotics or discontinuation of antibiotics and continue supportive care. If the respiratory panel is positive for atypical bacterial infection (Bordetella pertussis, Chlamydophila pneumoniae, or Mycoplasma pneumoniae), consider antibiotic de-escalation to target atypical bacterial infection.     Comprehensive Metabolic Panel [[id number]]  (Abnormal) Collected: 01/19/25 2248    Specimen: Blood from Arm, Right Updated: 01/19/25 2314     Glucose 107 mg/dL      BUN 10 mg/dL      Creatinine 0.71 mg/dL      Sodium 136 mmol/L      Potassium 4.8 mmol/L      Comment: Slight hemolysis detected by analyzer. Result may be falsely elevated.        Chloride 97 mmol/L      CO2 33.5 mmol/L      Calcium 9.5 mg/dL      Total Protein 7.6 g/dL      Albumin 3.7 g/dL      ALT (SGPT) 43 U/L      AST (SGOT) 38 U/L      Comment: Slight hemolysis detected by analyzer. Result may be falsely elevated.        Alkaline Phosphatase 143 U/L      Total Bilirubin 0.2 mg/dL      Globulin 3.9 gm/dL      A/G Ratio 0.9 g/dL      BUN/Creatinine Ratio 14.1     Anion Gap 5.5 mmol/L      eGFR 114.6 mL/min/1.73     Narrative:      GFR Categories in Chronic Kidney Disease (CKD)      GFR Category          GFR (mL/min/1.73)    Interpretation  G1                     90 or greater         Normal or high (1)  G2                      60-89                Mild decrease (1)  G3a                   45-59                Mild to moderate decrease  G3b                   30-44                Moderate to severe decrease  G4                    15-29                Severe decrease  G5                    14 or less           Kidney failure          (1)In the absence of evidence of kidney disease, neither GFR category G1 or G2 fulfill the criteria for CKD.    eGFR calculation 2021 CKD-EPI creatinine equation, which does not include race as a factor    Magnesium [[id number]]  (Normal) Collected: 01/19/25 2248    Specimen: Blood from Arm, Right Updated: 01/19/25 2314     Magnesium 2.2 mg/dL     Phosphorus [[id number]]  (Normal) Collected: 01/19/25 2248    Specimen: Blood from Arm, Right Updated: 01/19/25 2313     Phosphorus 3.3 mg/dL     Methemoglobin [[id number]]  (Normal) Collected: 01/19/25 2248    Specimen: Blood from Arm, Right Updated: 01/19/25 2259     Methemoglobin  <1.00 %     CBC Auto Differential [[id number]]  (Abnormal) Collected: 01/19/25 2248    Specimen: Blood from Arm, Right Updated: 01/19/25 2255     WBC 9.59 10*3/mm3      RBC 4.44 10*6/mm3      Hemoglobin 11.9 g/dL      Hematocrit 39.6 %      MCV 89.2 fL      MCH 26.8 pg      MCHC 30.1 g/dL      RDW 13.8 %      RDW-SD 45.2 fl      MPV 9.4 fL      Platelets 250 10*3/mm3      Neutrophil % 74.7 %      Lymphocyte % 16.4 %      Monocyte % 8.0 %      Eosinophil % 0.2 %      Basophil % 0.1 %      Immature Grans % 0.6 %      Neutrophils, Absolute 7.16 10*3/mm3      Lymphocytes, Absolute 1.57 10*3/mm3      Monocytes, Absolute 0.77 10*3/mm3      Eosinophils, Absolute 0.02 10*3/mm3      Basophils, Absolute 0.01 10*3/mm3      Immature Grans, Absolute 0.06 10*3/mm3      nRBC 0.0 /100 WBC     BNP [[id number]]  (Normal) Collected: 01/19/25 1630    Specimen: Blood Updated: 01/19/25 2228     proBNP 153.9 pg/mL     Narrative:      This assay is used as an aid in the diagnosis of individuals suspected of having heart failure. It can be used as an aid in the diagnosis of acute decompensated heart failure (ADHF) in patients presenting with signs and symptoms of ADHF to the emergency department (ED). In addition, NT-proBNP of <300 pg/mL indicates ADHF is not likely.    Age Range Result Interpretation  NT-proBNP Concentration (pg/mL:      <50             Positive            >450                   Gray                 300-450                    Negative             <300    50-75           Positive            >900                  Gray                300-900                  Negative            <300      >75             Positive            >1800                  Gray                300-1800                  Negative            <300    Blood Gas, Arterial - [[id number]]  (Abnormal) Collected: 01/19/25 2205    Specimen: Arterial Blood Updated: 01/19/25 2210     Site Left Radial     Shay's Test Positive     pH, Arterial 7.349 pH units      pCO2,  Arterial 67.6 mm Hg      pO2, Arterial 70.9 mm Hg      HCO3, Arterial 37.2 mmol/L      Base Excess, Arterial 9.0 mmol/L      Comment: Serial Number: [id number]:  [id number]        O2 Saturation, Arterial 92.3 %      CO2 Content 39.3 mmol/L      Barometric Pressure for Blood Gas --     Comment: N/A        Modality Cannula     FIO2 36 %      Hemodilution No     PO2/FIO2 197    Lactic Acid, Plasma [[id number]]  (Normal) Collected: 01/19/25 1630    Specimen: Blood Updated: 01/19/25 1713     Lactate 1.4 mmol/L     Comprehensive Metabolic Panel [[id number]]  (Abnormal) Collected: 01/19/25 1630    Specimen: Blood Updated: 01/19/25 1705     Glucose 107 mg/dL      BUN 11 mg/dL      Creatinine 0.74 mg/dL      Sodium 138 mmol/L      Potassium 5.0 mmol/L      Chloride 95 mmol/L      CO2 38.1 mmol/L      Calcium 9.5 mg/dL      Total Protein 7.5 g/dL      Albumin 3.9 g/dL      ALT (SGPT) 38 U/L      AST (SGOT) 25 U/L      Alkaline Phosphatase 157 U/L      Total Bilirubin 0.2 mg/dL      Globulin 3.6 gm/dL      A/G Ratio 1.1 g/dL      BUN/Creatinine Ratio 14.9     Anion Gap 4.9 mmol/L      eGFR 109.0 mL/min/1.73     Narrative:      GFR Categories in Chronic Kidney Disease (CKD)      GFR Category          GFR (mL/min/1.73)    Interpretation  G1                     90 or greater         Normal or high (1)  G2                      60-89                Mild decrease (1)  G3a                   45-59                Mild to moderate decrease  G3b                   30-44                Moderate to severe decrease  G4                    15-29                Severe decrease  G5                    14 or less           Kidney failure          (1)In the absence of evidence of kidney disease, neither GFR category G1 or G2 fulfill the criteria for CKD.    eGFR calculation 2021 CKD-EPI creatinine equation, which does not include race as a factor    COVID-19 and FLU A/B PCR, 1 HR TAT - Swab, Nasopharynx [[id number]]  (Normal) Collected: 01/19/25 9995  "   Specimen: Swab from Nasopharynx Updated: 01/19/25 1659     COVID19 Not Detected     Influenza A PCR Not Detected     Influenza B PCR Not Detected    Narrative:      Fact sheet for providers: https://www.fda.gov/media/077016/download    Fact sheet for patients: https://www.fda.gov/media/381863/download    Test performed by PCR.    D-dimer, Quantitative [[id number]]  (Normal) Collected: 01/19/25 1630    Specimen: Blood Updated: 01/19/25 1658     D-Dimer, Quantitative 0.34 MCGFEU/mL     Narrative:      According to the assay 's published package insert, a normal (<0.50 MCGFEU/mL) D-dimer result in conjunction with a non-high clinical probability assessment, excludes deep vein thrombosis (DVT) and pulmonary embolism (PE) with high sensitivity.    D-dimer values increase with age and this can make VTE exclusion of an older population difficult. To address this, the [employer], based on best available evidence and recent guidelines, recommends that clinicians use age-adjusted D-dimer thresholds in patients greater than 50 years of age with: a) a low probability of PE who do not meet all Pulmonary Embolism Rule Out Criteria, or b) in those with intermediate probability of PE.   The formula for an age-adjusted D-dimer cut-off is \"age/100\".  For example, a 60 year old patient would have an age-adjusted cut-off of 0.60 MCGFEU/mL and an 80 year old 0.80 MCGFEU/mL.    CBC & Differential [[id number]]  (Abnormal) Collected: 01/19/25 1630    Specimen: Blood Updated: 01/19/25 1643    Narrative:      The following orders were created for panel order CBC & Differential.  Procedure                               Abnormality         Status                     ---------                               -----------         ------                     CBC Auto Differential[[id number]]        Abnormal            Final result                 Please view results for these tests on the individual orders.    CBC Auto " Differential [[id number]]  (Abnormal) Collected: 01/19/25 1630    Specimen: Blood Updated: 01/19/25 1643     WBC 8.16 10*3/mm3      RBC 4.57 10*6/mm3      Hemoglobin 12.3 g/dL      Hematocrit 40.6 %      MCV 88.8 fL      MCH 26.9 pg      MCHC 30.3 g/dL      RDW 14.2 %      RDW-SD 46.7 fl      MPV 9.2 fL      Platelets 254 10*3/mm3      Neutrophil % 79.8 %      Lymphocyte % 13.0 %      Monocyte % 6.5 %      Eosinophil % 0.2 %      Basophil % 0.1 %      Immature Grans % 0.4 %      Neutrophils, Absolute 6.51 10*3/mm3      Lymphocytes, Absolute 1.06 10*3/mm3      Monocytes, Absolute 0.53 10*3/mm3      Eosinophils, Absolute 0.02 10*3/mm3      Basophils, Absolute 0.01 10*3/mm3      Immature Grans, Absolute 0.03 10*3/mm3              Imaging Results (Last 24 Hours)       Procedure Component Value Units Date/Time    CT Head Without Contrast [[id number]] Collected: 01/19/25 1831     Updated: 01/19/25 1835    Narrative:      CT HEAD WO CONTRAST    Date of Exam: 1/19/2025 6:15 PM EST    Indication: headache.    Comparison: None available.    Technique: Axial CT images were obtained of the head without contrast administration.  Coronal reconstructions were performed.  Automated exposure control and iterative reconstruction methods were used.      Findings:  No acute intracranial hemorrhage or extra-axial collection is identified. The ventricles appear normal in caliber, with no evidence of mass effect or midline shift. The basal cisterns appear patent. The gray-white differentiation appears preserved.    The calvarium appear intact. The paranasal sinuses are clear. The mastoid air cells are well-aerated. Intravascular contrast from a recent procedure is noted.      Impression:      Impression:  No acute intracranial process identified.      Electronically Signed: Brady Zhang MD    1/19/2025 6:32 PM EST    Workstation ID: [id number]    CT Angiogram Chest Pulmonary Embolism [[id number]] Collected: 01/19/25 1732     Updated:  01/19/25 1740    Narrative:      CT ANGIOGRAM CHEST PULMONARY EMBOLISM    Date of Exam: 1/19/2025 5:10 PM EST    Indication: hypoxia.    Comparison: Chest radiograph from earlier today    Technique: Axial CT images were obtained of the chest after the uneventful intravenous administration of iodinated contrast utilizing pulmonary embolism protocol.  In addition, a 3-D volume rendered image was created for interpretation.  Sagittal and   coronal reconstructions were performed.  Automated exposure control and iterative reconstruction methods were used.      Findings:  The central tracheobronchial tree is clear. The lungs are clear. There is no pleural effusion.    The heart size appears normal. The great vessels are normal in caliber. There is no evidence of pulmonary embolus. No abnormally enlarged lymph nodes are identified. Bilateral breast implants are present.    Partial evaluation of the upper abdomen demonstrates hepatic steatosis and changes of cholecystectomy.    No aggressive osseous lesions are identified.      Impression:      Impression:  1.Negative for pulmonary embolus.  2.No acute cardiopulmonary process.  3.Hepatic steatosis.        Electronically Signed: Brady Zhang MD    1/19/2025 5:38 PM EST    Workstation ID: [id number]    XR Chest 1 View [[id number]] Collected: 01/19/25 1648     Updated: 01/19/25 1651    Narrative:      XR CHEST 1 VW    Date of Exam: 1/19/2025 4:30 PM EST    Indication: shortness of breath    Comparison: Two-view chest x-ray 1/27/2017    Findings:  Lungs are adequately expanded and appear clear. No pneumothorax or large pleural effusion is seen. Cardiomediastinal contours appear stable.      Impression:      Impression:  No acute cardiopulmonary abnormality is identified.        Electronically Signed: Isabell Decker    1/19/2025 4:49 PM EST    Workstation ID: [id number]              Assessment & Plan        This is a 34 y.o. female with:    Active and Resolved Problems  Active  Hospital Problems    Diagnosis  POA    **Acute hypoxic respiratory failure [J96.01]  Yes    Acute bronchitis due to Rhinovirus [J20.6]  Unknown      Resolved Hospital Problems   No resolved problems to display.       Acute hypoxic and hypercapnic respiratory failure, probably multifactorial due to OHS, probably undiagnosed COPD given ongoing smoking, rhinovirus bronchitis.   Patient requiring 4 L NC prior to admission  Will place on BIPAP for now and repeat ABG in 2 hours  Start bronchodilators duo-nebs q 6 scheduled, q 4 PRN  Star IV steroids given suspicion for undiagnosed COPD  Smoking cessation counseling  Pulmonology consultation       Morbid Obesity  Recently started on ozempic outpatient  Continue lifestyle modifications      History of depression  Continue home medications, pending reconciliation    VTE Prophylaxis:  Pharmacologic VTE prophylaxis orders are present.        The patient desires to be as follows:    CODE STATUS:    Code Status (Patient has no pulse and is not breathing): CPR (Attempt to Resuscitate)  Medical Interventions (Patient has pulse or is breathing): Full Support        Jaymie Donnelly, who can be contacted at , is the designated person to make medical decisions on the patient's behalf if She is incapable of doing so. This was clarified with patient and/or next of kin on 1/19/2025 during the course of this H&P.    Admission Status:  I believe this patient meets inpatient status.    Expected Length of Stay: 2-3    PDMP and Medication Dispenses via Sidebar reviewed and consistent with patient reported medications.    I discussed the patient's findings and my recommendations with patient, family, and nursing staff.      Signature:     This document has been electronically signed by Carlos Llanes Alvarez, MD on January 20, 2025 01:37 [facility] Hospitalist Team

## 2025-01-20 NOTE — CONSULTS
Group: [facility]         CONSULT NOTE    Patient Identification:  Jacqueline Turcios  34 y.o.  female  1990  [id number]            Requesting physician: Attending physician    Reason for Consultation: ABG        History of Present Illness:    34-year-old female admitted on 2025 with nonproductive cough headaches after after starting two new medications. Mounjaro injection and antidepressant, Rexulti   O2 dropped to 78% ambulating to ct scan, and increased to 85-87% when she sat down on the CT table. Patient then put on 6L of O2 during CT scan.   Patient walked back to room with no O2 and O2 dropped to 83%       Assessment:    Acute/chronic hypercapnic/hypoxic resp insuff    Positive Rhino Virus    No PE on ct chest  No alveolar infiltrates    nonproductive cough headaches after starting two new medication:   Mounjaro injection and antidepressant, Rexulti     Hx of Asthma  Smoker 10 p/yr    LUIS ALBERTO    Recommendations:    O2 titartion    Prednison    Azithomycine    DuoNebs    Sleep study and PFT as outpt    2 D Echo r/o PAH          Review of Sytems:  Review of Systems   Respiratory:  Positive for cough and shortness of breath. Negative for wheezing and stridor.    Cardiovascular:  Positive for palpitations and leg swelling. Negative for chest pain.       Past Medical History:  Past Medical History:   Diagnosis Date    Asthma     Kidney stone        Past Surgical History:  Past Surgical History:   Procedure Laterality Date     SECTION      CHOLECYSTECTOMY      DILATION AND CURETTAGE, DIAGNOSTIC / THERAPEUTIC      EXCISION LESION N/A 2024    Procedure: EXCISION of cyst from psterior neck, prone position;  Surgeon: Raul Lawson MD;  Location: [facility] MAIN OR;  Service: General;  Laterality: N/A;    URETERAL STENT INSERTION          Home Meds:  Medications Prior to Admission   Medication Sig Dispense Refill Last Dose/Taking    Brexpiprazole (Rexulti) 2 MG tablet Take 1 tablet by  mouth Daily.   1/18/2025    FLUoxetine (PROzac) 20 MG capsule Take 2 capsules by mouth Daily.   1/18/2025    gabapentin (NEURONTIN) 100 MG capsule Take 1 capsule by mouth 3 (Three) Times a Day.   1/18/2025    hydrOXYzine (ATARAX) 25 MG tablet Take 2 tablets by mouth Every 6 (Six) Hours As Needed for Anxiety. 20 tablet 0 Past Week    lamoTRIgine (LaMICtal) 25 MG tablet Take 1 tablet by mouth Daily.   1/18/2025    methocarbamol (ROBAXIN) 750 MG tablet Take 1 tablet by mouth 3 (Three) Times a Day.   1/18/2025    methadone (DOLOPHINE) 5 MG tablet Take 177 mg by mouth Daily.       naloxone (NARCAN) 4 MG/0.1ML nasal spray Call 911. Don't prime. [place] in 1 nostril for overdose. Repeat in 2-3 minutes in other nostril if no or minimal breathing/responsiveness. 2 each 0 Unknown    Semaglutide,0.25 or 0.5MG/DOS, (OZEMPIC) 2 MG/1.5ML solution pen-injector Inject 0.25 mg under the skin into the appropriate area as directed 1 (One) Time Per Week.   1/17/2025    vitamin D (ERGOCALCIFEROL) 1.25 MG (54832 UT) capsule capsule Take 1 capsule by mouth 1 (One) Time Per Week.   1/16/2025       Allergies:  Allergies   Allergen Reactions    Penicillins Hives     Beta lactam allergy details  Antibiotic reaction: other (throat closed)  Age at reaction: infant  Dose to reaction time: (!) hours  Reason for antibiotic: unknown  Epinephrine required for reaction?: unknown  Tolerated antibiotics: amoxicillin, cephalexin           Social History:   Social History     Socioeconomic History    Marital status: Single   Tobacco Use    Smoking status: Every Day     Current packs/day: 1.00     Average packs/day: 1 pack/day for 12.1 years (12.1 ttl pk-yrs)     Types: Cigarettes     Start date: 2013     Passive exposure: Current    Smokeless tobacco: Never   Vaping Use    Vaping status: Never Used   Substance and Sexual Activity    Alcohol use: No    Drug use: Never    Sexual activity: Defer       Family History:  History reviewed. No pertinent family  "history.    Physical Exam:  /78 (BP Location: Right arm, Patient Position: Lying)   Pulse 87   Temp 97.8 °F (36.6 °C) (Oral)   Resp 16   Ht 170.2 cm (67\")   Wt (!) 154 kg (340 lb 2.7 oz)   LMP 12/19/2024 (Approximate)   SpO2 99%   BMI 53.28 kg/m²  Body mass index is 53.28 kg/m². 99% (!) 154 kg (340 lb 2.7 oz)  Physical Exam  Cardiovascular:      Heart sounds: No murmur heard.     No gallop.   Pulmonary:      Effort: No respiratory distress.      Breath sounds: No stridor. Rhonchi present. No wheezing or rales.   Chest:      Chest wall: No tenderness.         LABS:  Lab Results   Component Value Date    CALCIUM 9.5 01/19/2025    PHOS 3.3 01/19/2025     Results from last 7 days   Lab Units 01/19/25  2248 01/19/25  1630   MAGNESIUM mg/dL 2.2  --    SODIUM mmol/L 136 138   POTASSIUM mmol/L 4.8 5.0   CHLORIDE mmol/L 97* 95*   CO2 mmol/L 33.5* 38.1*   BUN mg/dL 10 11   CREATININE mg/dL 0.71 0.74   GLUCOSE mg/dL 107* 107*   CALCIUM mg/dL 9.5 9.5   WBC 10*3/mm3 9.59 8.16   HEMOGLOBIN g/dL 11.9* 12.3   PLATELETS 10*3/mm3 250 254   ALT (SGPT) U/L 43* 38*   AST (SGOT) U/L 38* 25   PROBNP pg/mL  --  153.9     Lab Results   Component Value Date    TROPONINI <0.010 09/14/2022             Results from last 7 days   Lab Units 01/19/25  1630   LACTATE mmol/L 1.4     Results from last 7 days   Lab Units 01/20/25  0305 01/20/25  0207 01/19/25  2205   PH, ARTERIAL pH units 7.352 7.348* 7.349*   PCO2, ARTERIAL mm Hg 55.0* 70.1* 67.6*   PO2 ART mm Hg 84.0 76.3* 70.9*   O2 SATURATION ART % 95.5 93.6* 92.3*   MODALITY  BiPap BiPap Cannula     Results from last 7 days   Lab Units 01/19/25  2249   ADENOVIRUS DETECTION BY PCR  Not Detected   CORONAVIRUS 229E  Not Detected   CORONAVIRUS HKU1  Not Detected   CORONAVIRUS NL63  Not Detected   CORONAVIRUS OC43  Not Detected   HUMAN METAPNEUMOVIRUS  Not Detected   HUMAN RHINOVIRUS/ENTEROVIRUS  Detected*   INFLUENZA B PCR  Not Detected   PARAINFLUENZA 1  Not Detected   PARAINFLUENZA " VIRUS 2  Not Detected   PARAINFLUENZA VIRUS 3  Not Detected   PARAINFLUENZA VIRUS 4  Not Detected   BORDETELLA PERTUSSIS PCR  Not Detected   CHLAMYDOPHILA PNEUMONIAE PCR  Not Detected   MYCOPLAMA PNEUMO PCR  Not Detected   INFLUENZA A PCR  Not Detected   RSV, PCR  Not Detected             Lab Results   Component Value Date    TSH 0.858 11/14/2024     Estimated Creatinine Clearance: 173.8 mL/min (by C-G formula based on SCr of 0.71 mg/dL).         Imaging:  Imaging Results (Last 24 Hours)       Procedure Component Value Units Date/Time    CT Head Without Contrast [[id number]] Collected: 01/19/25 1831     Updated: 01/19/25 1835    Narrative:      CT HEAD WO CONTRAST    Date of Exam: 1/19/2025 6:15 PM EST    Indication: headache.    Comparison: None available.    Technique: Axial CT images were obtained of the head without contrast administration.  Coronal reconstructions were performed.  Automated exposure control and iterative reconstruction methods were used.      Findings:  No acute intracranial hemorrhage or extra-axial collection is identified. The ventricles appear normal in caliber, with no evidence of mass effect or midline shift. The basal cisterns appear patent. The gray-white differentiation appears preserved.    The calvarium appear intact. The paranasal sinuses are clear. The mastoid air cells are well-aerated. Intravascular contrast from a recent procedure is noted.      Impression:      Impression:  No acute intracranial process identified.      Electronically Signed: Brady Zhang MD    1/19/2025 6:32 PM EST    Workstation ID: [id number]    CT Angiogram Chest Pulmonary Embolism [[id number]] Collected: 01/19/25 1732     Updated: 01/19/25 1740    Narrative:      CT ANGIOGRAM CHEST PULMONARY EMBOLISM    Date of Exam: 1/19/2025 5:10 PM EST    Indication: hypoxia.    Comparison: Chest radiograph from earlier today    Technique: Axial CT images were obtained of the chest after the uneventful intravenous  administration of iodinated contrast utilizing pulmonary embolism protocol.  In addition, a 3-D volume rendered image was created for interpretation.  Sagittal and   coronal reconstructions were performed.  Automated exposure control and iterative reconstruction methods were used.      Findings:  The central tracheobronchial tree is clear. The lungs are clear. There is no pleural effusion.    The heart size appears normal. The great vessels are normal in caliber. There is no evidence of pulmonary embolus. No abnormally enlarged lymph nodes are identified. Bilateral breast implants are present.    Partial evaluation of the upper abdomen demonstrates hepatic steatosis and changes of cholecystectomy.    No aggressive osseous lesions are identified.      Impression:      Impression:  1.Negative for pulmonary embolus.  2.No acute cardiopulmonary process.  3.Hepatic steatosis.        Electronically Signed: Brady Zhang MD    1/19/2025 5:38 PM EST    Workstation ID: [id number]    XR Chest 1 View [[id number]] Collected: 01/19/25 1648     Updated: 01/19/25 1651    Narrative:      XR CHEST 1 VW    Date of Exam: 1/19/2025 4:30 PM EST    Indication: shortness of breath    Comparison: Two-view chest x-ray 1/27/2017    Findings:  Lungs are adequately expanded and appear clear. No pneumothorax or large pleural effusion is seen. Cardiomediastinal contours appear stable.      Impression:      Impression:  No acute cardiopulmonary abnormality is identified.        Electronically Signed: Isabell Decker    1/19/2025 4:49 PM EST    Workstation ID: [id number]              Current Meds:   SCHEDULE  ascorbic acid, 1,000 mg, Oral, Daily  FLUoxetine, 40 mg, Oral, Daily  gabapentin, 100 mg, Oral, TID  heparin (porcine), 5,000 Units, Subcutaneous, Q8H  ipratropium-albuterol, 3 mL, Nebulization, 4x Daily - RT  lamoTRIgine, 25 mg, Oral, Daily  methylPREDNISolone sodium succinate, 40 mg, Intravenous, Q6H  sodium chloride, 10 mL, Intravenous,  Q12H      Infusions     PRNs    senna-docusate sodium **AND** polyethylene glycol **AND** bisacodyl **AND** bisacodyl    Calcium Replacement - Follow Nurse / BPA Driven Protocol    hydrOXYzine    Magnesium Standard Dose Replacement - Follow Nurse / BPA Driven Protocol    nitroglycerin    Phosphorus Replacement - Follow Nurse / BPA Driven Protocol    Potassium Replacement - Follow Nurse / BPA Driven Protocol    sodium chloride    sodium chloride        Lyle Hayward MD  1/20/2025  08:40 EST      Much of this encounter note is an electronic transcription/translation of spoken language to printed text using Dragon Software.

## 2025-01-21 VITALS
WEIGHT: 293 LBS | DIASTOLIC BLOOD PRESSURE: 78 MMHG | TEMPERATURE: 97.6 F | HEART RATE: 89 BPM | BODY MASS INDEX: 45.99 KG/M2 | SYSTOLIC BLOOD PRESSURE: 114 MMHG | HEIGHT: 67 IN | RESPIRATION RATE: 18 BRPM | OXYGEN SATURATION: 92 %

## 2025-01-21 LAB
ANION GAP SERPL CALCULATED.3IONS-SCNC: 4.6 MMOL/L (ref 5–15)
AORTIC DIMENSIONLESS INDEX: 0.86 (DI)
AV MEAN PRESS GRAD SYS DOP V1V2: 6 MMHG
AV VMAX SYS DOP: 169 CM/SEC
BH CV ECHO MEAS - ACS: 2 CM
BH CV ECHO MEAS - AO MAX PG: 11.4 MMHG
BH CV ECHO MEAS - AO V2 VTI: 29.9 CM
BH CV ECHO MEAS - AVA(I,D): 3.7 CM2
BH CV ECHO MEAS - EDV(CUBED): 148.9 ML
BH CV ECHO MEAS - EDV(MOD-SP4): 87.2 ML
BH CV ECHO MEAS - EF(MOD-SP4): 65.5 %
BH CV ECHO MEAS - ESV(CUBED): 46.7 ML
BH CV ECHO MEAS - ESV(MOD-SP4): 30.1 ML
BH CV ECHO MEAS - FS: 32.1 %
BH CV ECHO MEAS - IVS/LVPW: 1.11 CM
BH CV ECHO MEAS - IVSD: 1 CM
BH CV ECHO MEAS - LA DIMENSION: 4.3 CM
BH CV ECHO MEAS - LAT PEAK E' VEL: 10.9 CM/SEC
BH CV ECHO MEAS - LV DIASTOLIC VOL/BSA (35-75): 34.4 CM2
BH CV ECHO MEAS - LV MASS(C)D: 187.3 GRAMS
BH CV ECHO MEAS - LV MAX PG: 8.4 MMHG
BH CV ECHO MEAS - LV MEAN PG: 4 MMHG
BH CV ECHO MEAS - LV SYSTOLIC VOL/BSA (12-30): 11.9 CM2
BH CV ECHO MEAS - LV V1 MAX: 145 CM/SEC
BH CV ECHO MEAS - LV V1 VTI: 26.8 CM
BH CV ECHO MEAS - LVIDD: 5.3 CM
BH CV ECHO MEAS - LVIDS: 3.6 CM
BH CV ECHO MEAS - LVOT AREA: 4.2 CM2
BH CV ECHO MEAS - LVOT DIAM: 2.3 CM
BH CV ECHO MEAS - LVPWD: 0.9 CM
BH CV ECHO MEAS - MED PEAK E' VEL: 7.3 CM/SEC
BH CV ECHO MEAS - MV A MAX VEL: 76.5 CM/SEC
BH CV ECHO MEAS - MV DEC SLOPE: 458 CM/SEC2
BH CV ECHO MEAS - MV DEC TIME: 0.19 SEC
BH CV ECHO MEAS - MV E MAX VEL: 71.8 CM/SEC
BH CV ECHO MEAS - MV E/A: 0.94
BH CV ECHO MEAS - MV MAX PG: 3.4 MMHG
BH CV ECHO MEAS - MV MEAN PG: 2 MMHG
BH CV ECHO MEAS - MV P1/2T: 50.3 MSEC
BH CV ECHO MEAS - MV V2 VTI: 19 CM
BH CV ECHO MEAS - MVA(P1/2T): 4.4 CM2
BH CV ECHO MEAS - MVA(VTI): 5.9 CM2
BH CV ECHO MEAS - PA ACC TIME: 0.13 SEC
BH CV ECHO MEAS - PA V2 MAX: 125 CM/SEC
BH CV ECHO MEAS - RAP SYSTOLE: 8 MMHG
BH CV ECHO MEAS - RV MAX PG: 4.1 MMHG
BH CV ECHO MEAS - RV V1 MAX: 101 CM/SEC
BH CV ECHO MEAS - RV V1 VTI: 19 CM
BH CV ECHO MEAS - RVDD: 3.3 CM
BH CV ECHO MEAS - RVSP: 48.7 MMHG
BH CV ECHO MEAS - SV(LVOT): 111.3 ML
BH CV ECHO MEAS - SV(MOD-SP4): 57.1 ML
BH CV ECHO MEAS - SVI(LVOT): 43.9 ML/M2
BH CV ECHO MEAS - SVI(MOD-SP4): 22.5 ML/M2
BH CV ECHO MEAS - TAPSE (>1.6): 1.94 CM
BH CV ECHO MEAS - TR MAX PG: 40.7 MMHG
BH CV ECHO MEAS - TR MAX VEL: 319 CM/SEC
BH CV ECHO MEASUREMENTS AVERAGE E/E' RATIO: 7.89
BH CV XLRA - TDI S': 16.2 CM/SEC
BUN SERPL-MCNC: 14 MG/DL (ref 6–20)
BUN/CREAT SERPL: 24.1 (ref 7–25)
CALCIUM SPEC-SCNC: 9.2 MG/DL (ref 8.6–10.5)
CHLORIDE SERPL-SCNC: 98 MMOL/L (ref 98–107)
CO2 SERPL-SCNC: 33.4 MMOL/L (ref 22–29)
CREAT SERPL-MCNC: 0.58 MG/DL (ref 0.57–1)
DEPRECATED RDW RBC AUTO: 45.2 FL (ref 37–54)
EGFRCR SERPLBLD CKD-EPI 2021: 122 ML/MIN/1.73
ERYTHROCYTE [DISTWIDTH] IN BLOOD BY AUTOMATED COUNT: 14 % (ref 12.3–15.4)
GLUCOSE SERPL-MCNC: 105 MG/DL (ref 65–99)
HCT VFR BLD AUTO: 36 % (ref 34–46.6)
HGB BLD-MCNC: 10.8 G/DL (ref 12–15.9)
LEFT ATRIUM VOLUME INDEX: 18.2 ML/M2
LV EF BIPLANE MOD: 65 %
MCH RBC QN AUTO: 26.8 PG (ref 26.6–33)
MCHC RBC AUTO-ENTMCNC: 30 G/DL (ref 31.5–35.7)
MCV RBC AUTO: 89.3 FL (ref 79–97)
PLATELET # BLD AUTO: 214 10*3/MM3 (ref 140–450)
PMV BLD AUTO: 9 FL (ref 6–12)
POTASSIUM SERPL-SCNC: 4.4 MMOL/L (ref 3.5–5.2)
RBC # BLD AUTO: 4.03 10*6/MM3 (ref 3.77–5.28)
SINUS: 3 CM
SODIUM SERPL-SCNC: 136 MMOL/L (ref 136–145)
WBC NRBC COR # BLD AUTO: 10.16 10*3/MM3 (ref 3.4–10.8)

## 2025-01-21 PROCEDURE — 94664 DEMO&/EVAL PT USE INHALER: CPT

## 2025-01-21 PROCEDURE — 94761 N-INVAS EAR/PLS OXIMETRY MLT: CPT

## 2025-01-21 PROCEDURE — 94799 UNLISTED PULMONARY SVC/PX: CPT

## 2025-01-21 PROCEDURE — 63710000001 PREDNISONE PER 1 MG: Performed by: STUDENT IN AN ORGANIZED HEALTH CARE EDUCATION/TRAINING PROGRAM

## 2025-01-21 PROCEDURE — 80048 BASIC METABOLIC PNL TOTAL CA: CPT | Performed by: STUDENT IN AN ORGANIZED HEALTH CARE EDUCATION/TRAINING PROGRAM

## 2025-01-21 PROCEDURE — 85027 COMPLETE CBC AUTOMATED: CPT | Performed by: STUDENT IN AN ORGANIZED HEALTH CARE EDUCATION/TRAINING PROGRAM

## 2025-01-21 PROCEDURE — 94660 CPAP INITIATION&MGMT: CPT

## 2025-01-21 PROCEDURE — 25010000002 HEPARIN (PORCINE) PER 1000 UNITS: Performed by: STUDENT IN AN ORGANIZED HEALTH CARE EDUCATION/TRAINING PROGRAM

## 2025-01-21 PROCEDURE — 94618 PULMONARY STRESS TESTING: CPT

## 2025-01-21 RX ORDER — BUTALBITAL, ACETAMINOPHEN AND CAFFEINE 50; 325; 40 MG/1; MG/1; MG/1
1 TABLET ORAL EVERY 4 HOURS PRN
Status: DISCONTINUED | OUTPATIENT
Start: 2025-01-21 | End: 2025-01-21 | Stop reason: HOSPADM

## 2025-01-21 RX ORDER — GUAIFENESIN 600 MG/1
1200 TABLET, EXTENDED RELEASE ORAL EVERY 12 HOURS SCHEDULED
Status: DISCONTINUED | OUTPATIENT
Start: 2025-01-21 | End: 2025-01-21 | Stop reason: HOSPADM

## 2025-01-21 RX ORDER — IPRATROPIUM BROMIDE AND ALBUTEROL SULFATE 2.5; .5 MG/3ML; MG/3ML
3 SOLUTION RESPIRATORY (INHALATION)
Qty: 360 ML | Refills: 0 | Status: SHIPPED | OUTPATIENT
Start: 2025-01-21

## 2025-01-21 RX ORDER — GUAIFENESIN 600 MG/1
1200 TABLET, EXTENDED RELEASE ORAL EVERY 12 HOURS SCHEDULED
Qty: 28 TABLET | Refills: 0 | Status: SHIPPED | OUTPATIENT
Start: 2025-01-21 | End: 2025-01-28

## 2025-01-21 RX ORDER — PREDNISONE 20 MG/1
40 TABLET ORAL
Qty: 6 TABLET | Refills: 0 | Status: SHIPPED | OUTPATIENT
Start: 2025-01-22 | End: 2025-01-25

## 2025-01-21 RX ADMIN — CALCIUM CARBONATE 2 TABLET: 500 TABLET, CHEWABLE ORAL at 09:24

## 2025-01-21 RX ADMIN — GUAIFENESIN 1200 MG: 600 TABLET ORAL at 11:55

## 2025-01-21 RX ADMIN — METHADONE HYDROCHLORIDE 177 MG: 10 CONCENTRATE ORAL at 09:24

## 2025-01-21 RX ADMIN — GABAPENTIN 100 MG: 100 CAPSULE ORAL at 08:53

## 2025-01-21 RX ADMIN — Medication 10 ML: at 08:54

## 2025-01-21 RX ADMIN — HEPARIN SODIUM 5000 UNITS: 5000 INJECTION INTRAVENOUS; SUBCUTANEOUS at 05:38

## 2025-01-21 RX ADMIN — LAMOTRIGINE 25 MG: 25 TABLET ORAL at 08:53

## 2025-01-21 RX ADMIN — HYDROXYZINE HYDROCHLORIDE 50 MG: 25 TABLET, FILM COATED ORAL at 09:28

## 2025-01-21 RX ADMIN — FLUOXETINE 40 MG: 20 CAPSULE ORAL at 08:53

## 2025-01-21 RX ADMIN — PREDNISONE 40 MG: 20 TABLET ORAL at 08:53

## 2025-01-21 RX ADMIN — OXYCODONE HYDROCHLORIDE AND ACETAMINOPHEN 1000 MG: 500 TABLET ORAL at 08:53

## 2025-01-21 RX ADMIN — IPRATROPIUM BROMIDE AND ALBUTEROL SULFATE 3 ML: .5; 3 SOLUTION RESPIRATORY (INHALATION) at 07:46

## 2025-01-21 NOTE — PLAN OF CARE
Goal Outcome Evaluation:           Progress: no change  Outcome Evaluation: Pt with low O2 sat at start of this shift. O2 titrated from 2L to 3L per NC to maintain sat >90%. Encouraged pt to wear bipap while sleeping, pt compliant with usage for a brief period of time, despite encouragement & education. Pt C/O extreme HA at start of shift, unrelieved with acetaminophen. 1x dose of dilaudid administered with PRN cough medication and PRN hydroxyzine with + effect noted. Pt rested abed throughout the noc with call light within reach. Spouse at bedside. Plan of care remains ongoing.

## 2025-01-21 NOTE — DISCHARGE PLACEMENT REQUEST
"Jacqueline Turcios (34 y.o. Female)       Date of Birth   1990    Social Security Number       Address   [address] [place] [address]    Home Phone   [phone]    MRN   [id number]       Judaism   Christianity    Marital Status   Single                            Admission Date   1/19/25    Admission Type   Urgent    Admitting Provider   Llanes Alvarez, Carlos, MD    Attending Provider   Sita East MD    Department, Room/Bed   [facility] 2F, 2216/1       Discharge Date       Discharge Disposition   Home or Self Care    Discharge Destination                                 Attending Provider: Sita East MD    Allergies: Penicillins    Isolation: Droplet   Infection: Rhinovirus  (01/19/25)   Code Status: CPR    Ht: 170.2 cm (67\")   Wt: 154 kg (340 lb)    Admission Cmt: None   Principal Problem: Acute hypoxic respiratory failure [J96.01]                   Active Insurance as of 1/19/2025       Primary Coverage       Payor Plan Insurance Group Employer/Plan Group    [employer] [place] [id number]       Payor Plan Address Payor Plan Phone Number Payor Plan Fax Number Effective Dates    MAIL STOP:   10/1/2022 - None Entered    [address] [address]       [facility] [address]         Subscriber Name Subscriber Birth Date Member ID       JACQUELINE TURCIOS 1990 [id number]                     Emergency Contacts        (Rel.) Home Phone Work Phone Mobile Phone    OSVALDOBC RODRIGUEZ (Mother) [phone] -- [phone]    PRITI JAMES (Significant Other) -- -- [phone]                "

## 2025-01-21 NOTE — PROCEDURES
Exercise Oximetry    Patient Name:Jacqueline Turcios   MRN: [id number]   Date: 01/21/25             ROOM AIR BASELINE   SpO2% 90   Heart Rate 100   Blood Pressure      EXERCISE ON ROOM AIR SpO2% EXERCISE ON O2 @  LPM SpO2%   1 MINUTE 90 1 MINUTE    2 MINUTES 89 2 MINUTES    3 MINUTES 90 3 MINUTES    4 MINUTES 89 4 MINUTES    5 MINUTES 90 5 MINUTES    6 MINUTES 89 6 MINUTES               Distance Walked   Distance Walked   Dyspnea (Jojo Scale)   Dyspnea (Jojo Scale)   Fatigue (Jojo Scale)   Fatigue (Jojo Scale)   SpO2% Post Exercise  94 SpO2% Post Exercise   HR Post Exercise  117 HR Post Exercise   Time to Recovery   Time to Recovery     Comments: Pt did not require oxygen at rest or with ambulation.

## 2025-01-21 NOTE — DISCHARGE SUMMARY
"             [facility] Medicine Services  Discharge Summary    Date of Service: 25  Patient Name: Jacqueline Turcios  : 1990  MRN: [id number]    Date of Admission: 2025  Discharge Diagnosis: Acute hypoxic respiratory failure secondary to acute rhinovirus bronchitis  Date of Discharge:  25  Primary Care Physician: Ellie Elizabeth APRN    Presenting Problem:   Hypoxia [R09.02]  Generalized headache [R51.9]  Adverse effect of drug, initial encounter [T50.905A]  Acute hypoxic respiratory failure [J96.01]    Active and Resolved Hospital Problems:  Active Hospital Problems    Diagnosis POA    **Acute hypoxic respiratory failure [J96.01] Yes    Acute bronchitis due to Rhinovirus [J20.6] Unknown      Resolved Hospital Problems   No resolved problems to display.         Hospital Course     HPI:  Per the H&P written by Carlos Llanes Alvarez, dated 25:  \"Jacqueline Turcios is a 34 y.o. female with a PMH of MDD, Morbid Obesity, ongoing smoking around 8 years PPD, Asthma, who presented to [facility] on 2025 with non productive cough, feeling weak all over, having diffuse headache over the past couple of days which she attributes to taking ozempic first time on Friday. Patient also reports having her home medication Rexulti dose doubled on Saturday, took only one tablet yesterday. Was initially seen [facility] ED where she was found hypoxic SPO2 77 % and require 4 L NC. There was also some concern for Methemoglobinemia ? Prior to transfer. Patient denies SOB, chest pain, orthopnea, fever, chills. CT chest prior to transfer was negative for PE or acute cardiopulmonary process. \"    Hospital Course:  Patient admitted as above. Pulmonology consulted. Patient noted to be positive for rhinovirus infection. Imaging not suggestive of pneumonia. Patient also started on steroids due to high suspicion for COPD, though she has not had prior PFTs completed. Admission also complicated by likely " obstructive sleep apnea and obesity hypoventilation syndrome. Patient would benefit from outpatient sleep study and further evaluation. Patient did not tolerate bipap well during stay but was able to be weaned from supplemental oxygen fairly quickly. Patient underwent walking oximetry test prior to discharge that did not demonstrate a need for home oxygen. Patient appropriate for discharge with oral steroids, neb treatments and close follow up with plans for further work up of probably COPD/LUIS ALBERTO/OHS. Would also recommend holding Rexulti and Ozempic until follow up with PCP, though I do not suspect that these were the cause of patient's symptoms.     DISCHARGE Follow Up Recommendations for labs and diagnostics:   - Follow up with PCP within 2 weeks of discharge  - Follow up with pulmonology within 2 weeks of discharge  - Recommend outpatient PFTs  - Recommend outpatient sleep study    Day of Discharge     Vital Signs:  Temp:  [97.4 °F (36.3 °C)-98.6 °F (37 °C)] 97.6 °F (36.4 °C)  Heart Rate:  [] 89  Resp:  [13-27] 18  BP: (106-127)/(53-78) 114/78  Flow (L/min) (Oxygen Therapy):  [2-3] 2    Physical Exam:   General: No acute distress, alert and oriented, obese  CV: RRR, no peripheral edema  Pulm: Minimal wheezing improved from prior exam, no increased work of breathing  Abd: Soft, nontender, nondistended  Skin: No rashes or lesions on exposed skin  Psych: Appropriate mood and affect    Pertinent  and/or Most Recent Results     LAB RESULTS:      Lab 01/21/25  0624 01/19/25 2248 01/19/25  1630   WBC 10.16 9.59 8.16   HEMOGLOBIN 10.8* 11.9* 12.3   HEMATOCRIT 36.0 39.6 40.6   PLATELETS 214 250 254   NEUTROS ABS  --  7.16* 6.51   IMMATURE GRANS (ABS)  --  0.06* 0.03   LYMPHS ABS  --  1.57 1.06   MONOS ABS  --  0.77 0.53   EOS ABS  --  0.02 0.02   MCV 89.3 89.2 88.8   LACTATE  --   --  1.4   D DIMER QUANT  --   --  0.34         Lab 01/21/25  0624 01/19/25 2248 01/19/25  1630   SODIUM 136 136 138   POTASSIUM 4.4 4.8  5.0   CHLORIDE 98 97* 95*   CO2 33.4* 33.5* 38.1*   ANION GAP 4.6* 5.5 4.9*   BUN 14 10 11   CREATININE 0.58 0.71 0.74   EGFR 122.0 114.6 109.0   GLUCOSE 105* 107* 107*   CALCIUM 9.2 9.5 9.5   MAGNESIUM  --  2.2  --    PHOSPHORUS  --  3.3  --          Lab 01/19/25  2248 01/19/25  1630   TOTAL PROTEIN 7.6 7.5   ALBUMIN 3.7 3.9   GLOBULIN 3.9 3.6   ALT (SGPT) 43* 38*   AST (SGOT) 38* 25   BILIRUBIN 0.2 0.2   ALK PHOS 143* 157*         Lab 01/19/25  1630   PROBNP 153.9                 Lab 01/20/25  0305 01/20/25  0207 01/19/25  2205   PH, ARTERIAL 7.352 7.348* 7.349*   PCO2, ARTERIAL 55.0* 70.1* 67.6*   PO2 ART 84.0 76.3* 70.9*   O2 SATURATION ART 95.5 93.6* 92.3*   FIO2 30 30 36   HCO3 ART 30.5* 38.6* 37.2*   BASE EXCESS ART 3.8* 10.2* 9.0*     Brief Urine Lab Results  (Last result in the past 365 days)        Color   Clarity   Blood   Leuk Est   Nitrite   Protein   CREAT   Urine HCG        11/14/24 1026               Negative             Microbiology Results (last 10 days)       Procedure Component Value - Date/Time    Respiratory Panel PCR w/COVID-19(SARS-CoV-2) LOUIE/GIOVANNY/TOMMY/PAD/COR/JANET In-House, NP Swab in UTM/VTM, 2 HR TAT - Swab, Nasopharynx [[id number]]  (Abnormal) Collected: 01/19/25 2249    Lab Status: Final result Specimen: Swab from Nasopharynx Updated: 01/19/25 6101     ADENOVIRUS, PCR Not Detected     Coronavirus 229E Not Detected     Coronavirus HKU1 Not Detected     Coronavirus NL63 Not Detected     Coronavirus OC43 Not Detected     COVID19 Not Detected     Human Metapneumovirus Not Detected     Human Rhinovirus/Enterovirus Detected     Influenza A PCR Not Detected     Influenza B PCR Not Detected     Parainfluenza Virus 1 Not Detected     Parainfluenza Virus 2 Not Detected     Parainfluenza Virus 3 Not Detected     Parainfluenza Virus 4 Not Detected     RSV, PCR Not Detected     Bordetella pertussis pcr Not Detected     Bordetella parapertussis PCR Not Detected     Chlamydophila pneumoniae PCR Not  Detected     Mycoplasma pneumo by PCR Not Detected    Narrative:      In the setting of a positive respiratory panel with a viral infection PLUS a negative procalcitonin without other underlying concern for bacterial infection, consider observing off antibiotics or discontinuation of antibiotics and continue supportive care. If the respiratory panel is positive for atypical bacterial infection (Bordetella pertussis, Chlamydophila pneumoniae, or Mycoplasma pneumoniae), consider antibiotic de-escalation to target atypical bacterial infection.    COVID-19 and FLU A/B PCR, 1 HR TAT - Swab, Nasopharynx [[id number]]  (Normal) Collected: 01/19/25 1629    Lab Status: Final result Specimen: Swab from Nasopharynx Updated: 01/19/25 1659     COVID19 Not Detected     Influenza A PCR Not Detected     Influenza B PCR Not Detected    Narrative:      Fact sheet for providers: https://www.fda.gov/media/751375/download    Fact sheet for patients: https://www.fda.gov/media/277434/download    Test performed by PCR.          CT Head Without Contrast    Result Date: 1/19/2025  Impression: Impression: No acute intracranial process identified. Electronically Signed: Brady Zhang MD  1/19/2025 6:32 PM EST  Workstation ID: [id number]    CT Angiogram Chest Pulmonary Embolism    Result Date: 1/19/2025  Impression: Impression: 1.Negative for pulmonary embolus. 2.No acute cardiopulmonary process. 3.Hepatic steatosis. Electronically Signed: Brady Zhang MD  1/19/2025 5:38 PM EST  Workstation ID: [id number]    XR Chest 1 View    Result Date: 1/19/2025  Impression: Impression: No acute cardiopulmonary abnormality is identified. Electronically Signed: Isabell Decker  1/19/2025 4:49 PM EST  Workstation ID: [id number]       Results for orders placed during the hospital encounter of 01/19/25    Adult Transthoracic Echo Complete W/ Cont if Necessary Per Protocol    Interpretation Summary    Left ventricular systolic function is normal. Calculated left  ventricular EF = 65% Left ventricular ejection fraction appears to be 61 - 65%.    Left ventricular diastolic function is consistent with (grade I) impaired relaxation.    Estimated right ventricular systolic pressure from tricuspid regurgitation is normal (<35 mmHg).    No significant valvular abnormalities noted.      Labs Pending at Discharge:   Pending Results       None            Procedures Performed     01/21 1126 Walking Oximetry    Consults:   Consults       Date and Time Order Name Status Description    1/20/2025  1:21 AM Inpatient Pulmonology Consult Completed             Discharge Details        Discharge Medications        New Medications        Instructions Start Date   guaiFENesin 600 MG 12 hr tablet  Commonly known as: MUCINEX   1,200 mg, Oral, Every 12 Hours Scheduled      ipratropium-albuterol 0.5-2.5 mg/3 ml nebulizer  Commonly known as: DUO-NEB   3 mL, Nebulization, 4 Times Daily - RT      predniSONE 20 MG tablet  Commonly known as: DELTASONE   40 mg, Oral, Daily With Breakfast   Start Date: January 22, 2025            Continue These Medications        Instructions Start Date   FLUoxetine 20 MG capsule  Commonly known as: PROzac   40 mg, Daily      gabapentin 100 MG capsule  Commonly known as: NEURONTIN   100 mg, 3 Times Daily      hydrOXYzine 25 MG tablet  Commonly known as: ATARAX   50 mg, Oral, Every 6 Hours PRN      lamoTRIgine 25 MG tablet  Commonly known as: LaMICtal   25 mg, Daily      methadone 5 MG tablet  Commonly known as: DOLOPHINE   177 mg, Oral, Daily      methocarbamol 750 MG tablet  Commonly known as: ROBAXIN   750 mg, 3 Times Daily      naloxone 4 MG/0.1ML nasal spray  Commonly known as: NARCAN   Call 911. Don't prime. [place] in 1 nostril for overdose. Repeat in 2-3 minutes in other nostril if no or minimal breathing/responsiveness.      vitamin D 1.25 MG (38063 UT) capsule capsule  Commonly known as: ERGOCALCIFEROL   50,000 Units, Oral, Weekly             Stop These Medications       Rexulti 2 MG tablet  Generic drug: Brexpiprazole     Semaglutide(0.25 or 0.5MG/DOS) 2 MG/1.5ML solution pen-injector  Commonly known as: OZEMPIC              Allergies   Allergen Reactions    Penicillins Hives     Beta lactam allergy details  Antibiotic reaction: other (throat closed)  Age at reaction: infant  Dose to reaction time: (!) hours  Reason for antibiotic: unknown  Epinephrine required for reaction?: unknown  Tolerated antibiotics: amoxicillin, cephalexin           Discharge Disposition:   Home    Diet:  Heart healthy    Discharge Activity:   As tolerated    CODE STATUS:  Code Status and Medical Interventions: CPR (Attempt to Resuscitate); Full Support   Ordered at: 01/19/25 2133     Code Status (Patient has no pulse and is not breathing):    CPR (Attempt to Resuscitate)     Medical Interventions (Patient has pulse or is breathing):    Full Support       No future appointments.    Additional Instructions for the Follow-ups that You Need to Schedule       Discharge Follow-up with PCP   As directed       Currently Documented PCP:    Ellie Elizabeth APRN    PCP Phone Number:    [phone]     Follow Up Details: Follow up with PCP within 2 weeks of discharge        Discharge Follow-up with Specified Provider: Pulmonology; 1 Month   As directed      To: Pulmonology   Follow Up: 1 Month                Time spent on Discharge including face to face service:  >30 minutes    Signature: Electronically signed by Sita East MD, 01/21/25, 15:18 [facility]  [facility] ([facility] Hospitalist Team

## 2025-01-21 NOTE — CASE MANAGEMENT/SOCIAL WORK
Continued Stay Note   [place]     Patient Name: Jacqueline Turcios  MRN: [id number]  Today's Date: 1/21/2025    Admit Date: 1/19/2025    Plan: Return home with family.   Discharge Plan       Row Name 01/21/25 1353       Plan    Plan Return home with family.    Patient/Family in Agreement with Plan yes    Provided Post Acute Provider List? N/A    Provided Post Acute Provider Quality & Resource List? N/A    Plan Comments CM notified per RN of pt need for nebulizer. CM confirmed order placed per MD, sent referral to [facility] per [facility] and notified liaisonBlanquita.          Expected Discharge Date and Time       Expected Discharge Date Expected Discharge Time    Jan 21, 2025        Eloisa Lawson, JACKY    [phone]  [email or web address]

## 2025-01-21 NOTE — CASE MANAGEMENT/SOCIAL WORK
Discharge Planning Assessment   [place]     Patient Name: Jacqueline Turcios  MRN: [id number]  Today's Date: 1/21/2025    Admit Date: 1/19/2025    Plan: Return home with family. Monitor O2 needs   Discharge Needs Assessment       Row Name 01/21/25 1102       Living Environment    People in Home significant other;child(lalit), dependent;parent(s)    Name(s) of People in Home mother Jaymie, aunt, boyfriend and her dependent children    Current Living Arrangements home    Potentially Unsafe Housing Conditions none    In the past 12 months has the electric, gas, oil, or water company threatened to shut off services in your home? No    Primary Care Provided by self    Provides Primary Care For no one    Family Caregiver if Needed significant other;parent(s)    Family Caregiver Names mom Jaymie, aunt, or boyfriend Carson    Quality of Family Relationships helpful;involved;supportive    Able to Return to Prior Arrangements yes       Resource/Environmental Concerns    Resource/Environmental Concerns none    Transportation Concerns none       Transportation Needs    In the past 12 months, has lack of transportation kept you from medical appointments or from getting medications? no    In the past 12 months, has lack of transportation kept you from meetings, work, or from getting things needed for daily living? No       Food Insecurity    Within the past 12 months, you worried that your food would run out before you got the money to buy more. Never true    Within the past 12 months, the food you bought just didn't last and you didn't have money to get more. Never true       Transition Planning    Patient/Family Anticipates Transition to home with family    Patient/Family Anticipated Services at Transition none    Transportation Anticipated family or friend will provide       Discharge Needs Assessment    Readmission Within the Last 30 Days no previous admission in last 30 days    Equipment Currently Used at Home none    Concerns to be  Addressed no discharge needs identified;denies needs/concerns at this time    Do you want help finding or keeping work or a job? Patient declined    Do you want help with school or training? For example, starting or completing job training or getting a high school diploma, GED or equivalent Patient declined    Anticipated Changes Related to Illness none    Equipment Needed After Discharge none                   Discharge Plan       Row Name 01/21/25 1103       Plan    Plan Return home with family. Monitor O2 needs    Plan Comments CM met with patient at bedside to discuss discharge planning. Patient states she lives at home with her mother Jaymie, her Aunt, boyfrienmakayla Byrd and dependent children. She drives and is independent with ADLs. She denies having any DME in home. PCP (Ellie Elizabeth) and pharmacy ([facility]) confirmed, agreeable to M2B. Denies issues affording medications. No current HHC or OPPT. Williamsfrienmakayla Byrd will transport at discharge.                  Continued Care and Services - Admitted Since 1/19/2025    No active coordination exists for this encounter.        Demographic Summary       Row Name 01/21/25 1100       General Information    Admission Type inpatient    Arrived From emergency department    Referral Source admission list    Reason for Consult care coordination/care conference;discharge planning    Preferred Language English       Contact Information    Permission Granted to Share Info With                    Functional Status       Row Name 01/21/25 1100       Functional Status    Usual Activity Tolerance good    Current Activity Tolerance good       Functional Status, IADL    Medications independent    Meal Preparation independent    Housekeeping independent    Laundry independent    Shopping independent       Mental Status Summary    Recent Changes in Mental Status/Cognitive Functioning no changes             Sheri Shields, RN     [facility]  [place]  Office: [phone]  Cell: [phone]  Fax # [phone]

## 2025-01-21 NOTE — NURSING NOTE
Pt is currently refusing for bipap to be placed on her at this time. Advised pt that would check back in an hour to encourage use. Pt verbalized understanding.

## 2025-01-21 NOTE — PROGRESS NOTES
Daily Progress Note        Acute hypoxic respiratory failure    Acute bronchitis due to Rhinovirus    Assessment:     Acute/chronic hypercapnic/hypoxic resp insuff     Positive Rhino Virus     No PE on ct chest  No alveolar infiltrates     nonproductive cough headaches after starting two new medication:   Mounjaro injection and antidepressant, Rexulti      Hx of Asthma  Smoker 10 p/yr     LUIS ALBERTO     Recommendations:     O2 titartion     Prednison     Azithomycine     DuoNebs     Sleep study and PFT as outpt     2 D Echo r/o PAH                LOS: 2 days     Subjective         Objective     Vital signs for last 24 hours:  Vitals:    01/21/25 0214 01/21/25 0516 01/21/25 0746 01/21/25 0752   BP:  111/69     BP Location:  Right arm     Patient Position:  Lying     Pulse: 89 82 76 88   Resp:  13 14 14   Temp:  98.3 °F (36.8 °C)     TempSrc:  Oral     SpO2: 94% 94% 97% 98%   Weight:       Height:           Intake/Output last 3 shifts:  I/O last 3 completed shifts:  In: 822 [P.O.:822]  Out: -   Intake/Output this shift:  No intake/output data recorded.      Radiology  Imaging Results (Last 24 Hours)       ** No results found for the last 24 hours. **            Labs:  Results from last 7 days   Lab Units 01/21/25  0624   WBC 10*3/mm3 10.16   HEMOGLOBIN g/dL 10.8*   HEMATOCRIT % 36.0   PLATELETS 10*3/mm3 214     Results from last 7 days   Lab Units 01/21/25  0624 01/19/25  2248   SODIUM mmol/L 136 136   POTASSIUM mmol/L 4.4 4.8   CHLORIDE mmol/L 98 97*   CO2 mmol/L 33.4* 33.5*   BUN mg/dL 14 10   CREATININE mg/dL 0.58 0.71   CALCIUM mg/dL 9.2 9.5   BILIRUBIN mg/dL  --  0.2   ALK PHOS U/L  --  143*   ALT (SGPT) U/L  --  43*   AST (SGOT) U/L  --  38*   GLUCOSE mg/dL 105* 107*     Results from last 7 days   Lab Units 01/20/25  0305   PH, ARTERIAL pH units 7.352   PO2 ART mm Hg 84.0   PCO2, ARTERIAL mm Hg 55.0*   HCO3 ART mmol/L 30.5*     Results from last 7 days   Lab Units 01/19/25  2248 01/19/25  1630   ALBUMIN g/dL 3.7 3.9              Results from last 7 days   Lab Units 01/19/25  2248   MAGNESIUM mg/dL 2.2                   Meds:   SCHEDULE  ascorbic acid, 1,000 mg, Oral, Daily  FLUoxetine, 40 mg, Oral, Daily  gabapentin, 100 mg, Oral, TID  heparin (porcine), 5,000 Units, Subcutaneous, Q8H  ipratropium-albuterol, 3 mL, Nebulization, 4x Daily - RT  lamoTRIgine, 25 mg, Oral, Daily  methadone, 177 mg, Oral, Daily  predniSONE, 40 mg, Oral, Daily With Breakfast  sodium chloride, 10 mL, Intravenous, Q12H      Infusions     PRNs    acetaminophen    senna-docusate sodium **AND** polyethylene glycol **AND** bisacodyl **AND** bisacodyl    butalbital-acetaminophen-caffeine    calcium carbonate    Calcium Replacement - Follow Nurse / BPA Driven Protocol    guaiFENesin-dextromethorphan    hydrOXYzine    Magnesium Standard Dose Replacement - Follow Nurse / BPA Driven Protocol    nitroglycerin    Phosphorus Replacement - Follow Nurse / BPA Driven Protocol    Potassium Replacement - Follow Nurse / BPA Driven Protocol    sodium chloride    sodium chloride    Physical Exam:  Physical Exam    ROS  Review of Systems          Total time spent with patient greater than: 45 Minutes

## 2025-01-21 NOTE — PROGRESS NOTES
Pt on bipap current setting was 18/9 with .30 rr 22.  Pt sats mid to upper 80's  titrated to60% pt came up to 96%,  thank you

## 2025-01-21 NOTE — NURSING NOTE
"Pt compliant with bipap usage during the time of [id number]. Pt requesting for bipap mask to be removed. Pt has been educated on risk v. benefits of usage per nursing & RT with pt verbalizing understanding. Educated pt that she should try to wear bipap as long as possible, pt verbalizes understanding stating, \"I'll put it back on in a little while, I just need a break\".  "

## 2025-01-22 NOTE — PAYOR COMM NOTE
"This is dc notification for Jacqueline Turcios.  Dc'd 1/21/25 routine home.    AUTHORIZATION PENDING:   PLEASE FAX OR CALL DETERMINATION TO CONTACT BELOW:   THANK YOU.      Bridgette Jarrett, RN, CCM  Utilization Nurse  [facility]   [address] [place] [address]   [phone]  Fx [phone]     Jacqueline Turcios (34 y.o. Female)       Date of Birth   1990    Social Security Number       Address   [address] [place] [address]    Home Phone   [phone]    MRN   [id number]       Synagogue   Zoroastrian    Marital Status   Single                            Admission Date   1/19/25    Admission Type   Urgent    Admitting Provider   Llanes Alvarez, Carlos, MD    Attending Provider       Department, Room/Bed   [facility] 2F, 2216/1       Discharge Date   1/21/2025    Discharge Disposition   Home or Self Care    Discharge Destination                                 Attending Provider: (none)   Allergies: Penicillins    Isolation: None   Infection: Rhinovirus  (01/19/25)   Code Status: Prior    Ht: 170.2 cm (67\")   Wt: 154 kg (340 lb)    Admission Cmt: None   Principal Problem: Acute hypoxic respiratory failure [J96.01]                   Active Insurance as of 1/19/2025       Primary Coverage       Payor Plan Insurance Group Employer/Plan Group    [id number]       Payor Plan Address Payor Plan Phone Number Payor Plan Fax Number Effective Dates    MAIL STOP:   10/1/2022 - None Entered    [address] [address]       [facility] [id number]         Subscriber Name Subscriber Birth Date Member ID       JACQUELINE TURCIOS 1990 [id number]                     Emergency Contacts        (Rel.) Home Phone Work Phone Mobile Phone    BC RILEY (Mother) [phone] -- [phone]    PRITI JAMES (Significant Other) -- -- [phone]                 Discharge Summary        Sita East MD at 01/21/25 0810                   " "    [facility] Services  Discharge Summary    Date of Service: 25  Patient Name: Jacqueline Turcios  : 1990  MRN: [id number]    Date of Admission: 2025  Discharge Diagnosis: Acute hypoxic respiratory failure secondary to acute rhinovirus bronchitis  Date of Discharge:  25  Primary Care Physician: Ellie Elizabeth APRN    Presenting Problem:   Hypoxia [R09.02]  Generalized headache [R51.9]  Adverse effect of drug, initial encounter [T50.905A]  Acute hypoxic respiratory failure [J96.01]    Active and Resolved Hospital Problems:  Active Hospital Problems    Diagnosis POA    **Acute hypoxic respiratory failure [J96.01] Yes    Acute bronchitis due to Rhinovirus [J20.6] Unknown      Resolved Hospital Problems   No resolved problems to display.         Hospital Course     HPI:  Per the H&P written by Carlos Llanes Alvarez, dated 25:  \"Jacqueline Turcios is a 34 y.o. female with a PMH of MDD, Morbid Obesity, ongoing smoking around 8 years PPD, Asthma, who presented to [facility] on 2025 with non productive cough, feeling weak all over, having diffuse headache over the past couple of days which she attributes to taking ozempic first time on Friday. Patient also reports having her home medication Rexulti dose doubled on Saturday, took only one tablet yesterday. Was initially seen at [facility] ED where she was found hypoxic SPO2 77 % and require 4 L NC. There was also some concern for Methemoglobinemia ? Prior to transfer. Patient denies SOB, chest pain, orthopnea, fever, chills. CT chest prior to transfer was negative for PE or acute cardiopulmonary process. \"    Hospital Course:  Patient admitted as above. Pulmonology consulted. Patient noted to be positive for rhinovirus infection. Imaging not suggestive of pneumonia. Patient also started on steroids due to high suspicion for COPD, though she has not had prior PFTs completed. Admission also complicated by likely " obstructive sleep apnea and obesity hypoventilation syndrome. Patient would benefit from outpatient sleep study and further evaluation. Patient did not tolerate bipap well during stay but was able to be weaned from supplemental oxygen fairly quickly. Patient underwent walking oximetry test prior to discharge that did not demonstrate a need for home oxygen. Patient appropriate for discharge with oral steroids, neb treatments and close follow up with plans for further work up of probably COPD/LUIS ALBERTO/OHS. Would also recommend holding Rexulti and Ozempic until follow up with PCP, though I do not suspect that these were the cause of patient's symptoms.     DISCHARGE Follow Up Recommendations for labs and diagnostics:   - Follow up with PCP within 2 weeks of discharge  - Follow up with pulmonology within 2 weeks of discharge  - Recommend outpatient PFTs  - Recommend outpatient sleep study    Day of Discharge     Vital Signs:  Temp:  [97.4 °F (36.3 °C)-98.6 °F (37 °C)] 97.6 °F (36.4 °C)  Heart Rate:  [] 89  Resp:  [13-27] 18  BP: (106-127)/(53-78) 114/78  Flow (L/min) (Oxygen Therapy):  [2-3] 2    Physical Exam:   General: No acute distress, alert and oriented, obese  CV: RRR, no peripheral edema  Pulm: Minimal wheezing improved from prior exam, no increased work of breathing  Abd: Soft, nontender, nondistended  Skin: No rashes or lesions on exposed skin  Psych: Appropriate mood and affect    Pertinent  and/or Most Recent Results     LAB RESULTS:      Lab 01/21/25  0624 01/19/25 2248 01/19/25  1630   WBC 10.16 9.59 8.16   HEMOGLOBIN 10.8* 11.9* 12.3   HEMATOCRIT 36.0 39.6 40.6   PLATELETS 214 250 254   NEUTROS ABS  --  7.16* 6.51   IMMATURE GRANS (ABS)  --  0.06* 0.03   LYMPHS ABS  --  1.57 1.06   MONOS ABS  --  0.77 0.53   EOS ABS  --  0.02 0.02   MCV 89.3 89.2 88.8   LACTATE  --   --  1.4   D DIMER QUANT  --   --  0.34         Lab 01/21/25  0624 01/19/25 2248 01/19/25  1630   SODIUM 136 136 138   POTASSIUM 4.4 4.8  5.0   CHLORIDE 98 97* 95*   CO2 33.4* 33.5* 38.1*   ANION GAP 4.6* 5.5 4.9*   BUN 14 10 11   CREATININE 0.58 0.71 0.74   EGFR 122.0 114.6 109.0   GLUCOSE 105* 107* 107*   CALCIUM 9.2 9.5 9.5   MAGNESIUM  --  2.2  --    PHOSPHORUS  --  3.3  --          Lab 01/19/25  2248 01/19/25  1630   TOTAL PROTEIN 7.6 7.5   ALBUMIN 3.7 3.9   GLOBULIN 3.9 3.6   ALT (SGPT) 43* 38*   AST (SGOT) 38* 25   BILIRUBIN 0.2 0.2   ALK PHOS 143* 157*         Lab 01/19/25  1630   PROBNP 153.9                 Lab 01/20/25  0305 01/20/25  0207 01/19/25  2205   PH, ARTERIAL 7.352 7.348* 7.349*   PCO2, ARTERIAL 55.0* 70.1* 67.6*   PO2 ART 84.0 76.3* 70.9*   O2 SATURATION ART 95.5 93.6* 92.3*   FIO2 30 30 36   HCO3 ART 30.5* 38.6* 37.2*   BASE EXCESS ART 3.8* 10.2* 9.0*     Brief Urine Lab Results  (Last result in the past 365 days)        Color   Clarity   Blood   Leuk Est   Nitrite   Protein   CREAT   Urine HCG        11/14/24 1026               Negative             Microbiology Results (last 10 days)       Procedure Component Value - Date/Time    Respiratory Panel PCR w/COVID-19(SARS-CoV-2) LOUIE/GIOVANNY/TOMMY/PAD/COR/JANET In-House, NP Swab in UTM/VTM, 2 HR TAT - Swab, Nasopharynx [[id number]]  (Abnormal) Collected: 01/19/25 2249    Lab Status: Final result Specimen: Swab from Nasopharynx Updated: 01/19/25 8021     ADENOVIRUS, PCR Not Detected     Coronavirus 229E Not Detected     Coronavirus HKU1 Not Detected     Coronavirus NL63 Not Detected     Coronavirus OC43 Not Detected     COVID19 Not Detected     Human Metapneumovirus Not Detected     Human Rhinovirus/Enterovirus Detected     Influenza A PCR Not Detected     Influenza B PCR Not Detected     Parainfluenza Virus 1 Not Detected     Parainfluenza Virus 2 Not Detected     Parainfluenza Virus 3 Not Detected     Parainfluenza Virus 4 Not Detected     RSV, PCR Not Detected     Bordetella pertussis pcr Not Detected     Bordetella parapertussis PCR Not Detected     Chlamydophila pneumoniae PCR Not  Detected     Mycoplasma pneumo by PCR Not Detected    Narrative:      In the setting of a positive respiratory panel with a viral infection PLUS a negative procalcitonin without other underlying concern for bacterial infection, consider observing off antibiotics or discontinuation of antibiotics and continue supportive care. If the respiratory panel is positive for atypical bacterial infection (Bordetella pertussis, Chlamydophila pneumoniae, or Mycoplasma pneumoniae), consider antibiotic de-escalation to target atypical bacterial infection.    COVID-19 and FLU A/B PCR, 1 HR TAT - Swab, Nasopharynx [[id number]]  (Normal) Collected: 01/19/25 1629    Lab Status: Final result Specimen: Swab from Nasopharynx Updated: 01/19/25 1659     COVID19 Not Detected     Influenza A PCR Not Detected     Influenza B PCR Not Detected    Narrative:      Fact sheet for providers: https://www.fda.gov/media/834714/download    Fact sheet for patients: https://www.fda.gov/media/521222/download    Test performed by PCR.          CT Head Without Contrast    Result Date: 1/19/2025  Impression: Impression: No acute intracranial process identified. Electronically Signed: Brady Zhang MD  1/19/2025 6:32 PM EST  Workstation ID: [id number]    CT Angiogram Chest Pulmonary Embolism    Result Date: 1/19/2025  Impression: Impression: 1.Negative for pulmonary embolus. 2.No acute cardiopulmonary process. 3.Hepatic steatosis. Electronically Signed: Brady Zhang MD  1/19/2025 5:38 PM EST  Workstation ID: [id number]    XR Chest 1 View    Result Date: 1/19/2025  Impression: Impression: No acute cardiopulmonary abnormality is identified. Electronically Signed: Isabell Decker  1/19/2025 4:49 PM EST  Workstation ID: [id number]       Results for orders placed during the hospital encounter of 01/19/25    Adult Transthoracic Echo Complete W/ Cont if Necessary Per Protocol    Interpretation Summary    Left ventricular systolic function is normal. Calculated left  ventricular EF = 65% Left ventricular ejection fraction appears to be 61 - 65%.    Left ventricular diastolic function is consistent with (grade I) impaired relaxation.    Estimated right ventricular systolic pressure from tricuspid regurgitation is normal (<35 mmHg).    No significant valvular abnormalities noted.      Labs Pending at Discharge:   Pending Results       None            Procedures Performed     01/21 1126 Walking Oximetry    Consults:   Consults       Date and Time Order Name Status Description    1/20/2025  1:21 AM Inpatient Pulmonology Consult Completed             Discharge Details        Discharge Medications        New Medications        Instructions Start Date   guaiFENesin 600 MG 12 hr tablet  Commonly known as: MUCINEX   1,200 mg, Oral, Every 12 Hours Scheduled      ipratropium-albuterol 0.5-2.5 mg/3 ml nebulizer  Commonly known as: DUO-NEB   3 mL, Nebulization, 4 Times Daily - RT      predniSONE 20 MG tablet  Commonly known as: DELTASONE   40 mg, Oral, Daily With Breakfast   Start Date: January 22, 2025            Continue These Medications        Instructions Start Date   FLUoxetine 20 MG capsule  Commonly known as: PROzac   40 mg, Daily      gabapentin 100 MG capsule  Commonly known as: NEURONTIN   100 mg, 3 Times Daily      hydrOXYzine 25 MG tablet  Commonly known as: ATARAX   50 mg, Oral, Every 6 Hours PRN      lamoTRIgine 25 MG tablet  Commonly known as: LaMICtal   25 mg, Daily      methadone 5 MG tablet  Commonly known as: DOLOPHINE   177 mg, Oral, Daily      methocarbamol 750 MG tablet  Commonly known as: ROBAXIN   750 mg, 3 Times Daily      naloxone 4 MG/0.1ML nasal spray  Commonly known as: NARCAN   Call 911. Don't prime. [place] in 1 nostril for overdose. Repeat in 2-3 minutes in other nostril if no or minimal breathing/responsiveness.      vitamin D 1.25 MG (86869 UT) capsule capsule  Commonly known as: ERGOCALCIFEROL   50,000 Units, Oral, Weekly             Stop These Medications       Rexulti 2 MG tablet  Generic drug: Brexpiprazole     Semaglutide(0.25 or 0.5MG/DOS) 2 MG/1.5ML solution pen-injector  Commonly known as: OZEMPIC              Allergies   Allergen Reactions    Penicillins Hives     Beta lactam allergy details  Antibiotic reaction: other (throat closed)  Age at reaction: infant  Dose to reaction time: (!) hours  Reason for antibiotic: unknown  Epinephrine required for reaction?: unknown  Tolerated antibiotics: amoxicillin, cephalexin           Discharge Disposition:   Home    Diet:  Heart healthy    Discharge Activity:   As tolerated    CODE STATUS:  Code Status and Medical Interventions: CPR (Attempt to Resuscitate); Full Support   Ordered at: 01/19/25 2133     Code Status (Patient has no pulse and is not breathing):    CPR (Attempt to Resuscitate)     Medical Interventions (Patient has pulse or is breathing):    Full Support       No future appointments.    Additional Instructions for the Follow-ups that You Need to Schedule       Discharge Follow-up with PCP   As directed       Currently Documented PCP:    Ellie Elizabeth APRN    PCP Phone Number:    [phone]     Follow Up Details: Follow up with PCP within 2 weeks of discharge        Discharge Follow-up with Specified Provider: Pulmonology; 1 Month   As directed      To: Pulmonology   Follow Up: 1 Month                Time spent on Discharge including face to face service:  >30 minutes    Signature: Electronically signed by Sita East MD, 01/21/25, 15:18 [facility].  [facility]      Electronically signed by Sita East MD at 01/21/25 5681

## 2025-01-22 NOTE — CASE MANAGEMENT/SOCIAL WORK
Case Management Discharge Note      Final Note: routine home    Provided Post Acute Provider List?: N/A  Provided Post Acute Provider Quality & Resource List?: N/A    Selected Continued Care - Discharged on 1/21/2025 Admission date: 1/19/2025 - Discharge disposition: Home or Self Care       Transportation Services  Private: Car    Final Discharge Disposition Code: 01 - home or self-care

## 2025-01-30 ENCOUNTER — APPOINTMENT (OUTPATIENT)
Dept: GENERAL RADIOLOGY | Facility: HOSPITAL | Age: 35
End: 2025-01-30
Payer: MEDICAID

## 2025-01-30 ENCOUNTER — APPOINTMENT (OUTPATIENT)
Dept: CT IMAGING | Facility: HOSPITAL | Age: 35
End: 2025-01-30
Payer: MEDICAID

## 2025-01-30 ENCOUNTER — HOSPITAL ENCOUNTER (INPATIENT)
Facility: HOSPITAL | Age: 35
LOS: 3 days | Discharge: HOME | End: 2025-02-02
Attending: EMERGENCY MEDICINE | Admitting: INTERNAL MEDICINE
Payer: MEDICAID

## 2025-01-30 DIAGNOSIS — J18.0 BRONCHOPNEUMONIA: Primary | ICD-10-CM

## 2025-01-30 DIAGNOSIS — R09.02 HYPOXIA: ICD-10-CM

## 2025-01-30 LAB
ALBUMIN SERPL-MCNC: 3.6 G/DL (ref 3.5–5.2)
ALBUMIN/GLOB SERPL: 1.2 G/DL
ALP SERPL-CCNC: 126 U/L (ref 39–117)
ALT SERPL W P-5'-P-CCNC: 32 U/L (ref 1–33)
ANION GAP SERPL CALCULATED.3IONS-SCNC: 4 MMOL/L (ref 5–15)
AST SERPL-CCNC: 19 U/L (ref 1–32)
B PARAPERT DNA SPEC QL NAA+PROBE: NOT DETECTED
B PARAPERT DNA SPEC QL NAA+PROBE: NOT DETECTED
B PERT DNA SPEC QL NAA+PROBE: NOT DETECTED
B PERT DNA SPEC QL NAA+PROBE: NOT DETECTED
B-HCG UR QL: NEGATIVE
BASOPHILS # BLD AUTO: 0.01 10*3/MM3 (ref 0–0.2)
BASOPHILS NFR BLD AUTO: 0.1 % (ref 0–1.5)
BILIRUB SERPL-MCNC: 0.4 MG/DL (ref 0–1.2)
BILIRUB UR QL STRIP: NEGATIVE
BUN SERPL-MCNC: 5 MG/DL (ref 6–20)
BUN/CREAT SERPL: 7.2 (ref 7–25)
C PNEUM DNA NPH QL NAA+NON-PROBE: NOT DETECTED
C PNEUM DNA NPH QL NAA+NON-PROBE: NOT DETECTED
CALCIUM SPEC-SCNC: 9.5 MG/DL (ref 8.6–10.5)
CHLORIDE SERPL-SCNC: 96 MMOL/L (ref 98–107)
CLARITY UR: ABNORMAL
CO2 SERPL-SCNC: 34 MMOL/L (ref 22–29)
COLOR UR: YELLOW
CREAT SERPL-MCNC: 0.69 MG/DL (ref 0.57–1)
D-LACTATE SERPL-SCNC: 1.4 MMOL/L (ref 0.5–2)
DEPRECATED RDW RBC AUTO: 47.3 FL (ref 37–54)
EGFRCR SERPLBLD CKD-EPI 2021: 117 ML/MIN/1.73
EOSINOPHIL # BLD AUTO: 0.02 10*3/MM3 (ref 0–0.4)
EOSINOPHIL NFR BLD AUTO: 0.1 % (ref 0.3–6.2)
ERYTHROCYTE [DISTWIDTH] IN BLOOD BY AUTOMATED COUNT: 14.2 % (ref 12.3–15.4)
FLUAV SUBTYP SPEC NAA+PROBE: NOT DETECTED
FLUBV RNA ISLT QL NAA+PROBE: NOT DETECTED
GLOBULIN UR ELPH-MCNC: 3 GM/DL
GLUCOSE SERPL-MCNC: 118 MG/DL (ref 65–99)
GLUCOSE UR STRIP-MCNC: NEGATIVE MG/DL
HADV DNA SPEC NAA+PROBE: NOT DETECTED
HADV DNA SPEC NAA+PROBE: NOT DETECTED
HCOV 229E RNA SPEC QL NAA+PROBE: NOT DETECTED
HCOV 229E RNA SPEC QL NAA+PROBE: NOT DETECTED
HCOV HKU1 RNA SPEC QL NAA+PROBE: NOT DETECTED
HCOV HKU1 RNA SPEC QL NAA+PROBE: NOT DETECTED
HCOV NL63 RNA SPEC QL NAA+PROBE: NOT DETECTED
HCOV NL63 RNA SPEC QL NAA+PROBE: NOT DETECTED
HCOV OC43 RNA SPEC QL NAA+PROBE: NOT DETECTED
HCOV OC43 RNA SPEC QL NAA+PROBE: NOT DETECTED
HCT VFR BLD AUTO: 39.8 % (ref 34–46.6)
HGB BLD-MCNC: 11.8 G/DL (ref 12–15.9)
HGB UR QL STRIP.AUTO: NEGATIVE
HMPV RNA NPH QL NAA+NON-PROBE: NOT DETECTED
HMPV RNA NPH QL NAA+NON-PROBE: NOT DETECTED
HPIV1 RNA ISLT QL NAA+PROBE: NOT DETECTED
HPIV1 RNA ISLT QL NAA+PROBE: NOT DETECTED
HPIV2 RNA SPEC QL NAA+PROBE: NOT DETECTED
HPIV2 RNA SPEC QL NAA+PROBE: NOT DETECTED
HPIV3 RNA NPH QL NAA+PROBE: NOT DETECTED
HPIV3 RNA NPH QL NAA+PROBE: NOT DETECTED
HPIV4 P GENE NPH QL NAA+PROBE: NOT DETECTED
HPIV4 P GENE NPH QL NAA+PROBE: NOT DETECTED
IMM GRANULOCYTES # BLD AUTO: 0.04 10*3/MM3 (ref 0–0.05)
IMM GRANULOCYTES NFR BLD AUTO: 0.3 % (ref 0–0.5)
KETONES UR QL STRIP: NEGATIVE
LEUKOCYTE ESTERASE UR QL STRIP.AUTO: NEGATIVE
LYMPHOCYTES # BLD AUTO: 1.06 10*3/MM3 (ref 0.7–3.1)
LYMPHOCYTES NFR BLD AUTO: 7.2 % (ref 19.6–45.3)
M PNEUMO IGG SER IA-ACNC: NOT DETECTED
M PNEUMO IGG SER IA-ACNC: NOT DETECTED
MCH RBC QN AUTO: 26.8 PG (ref 26.6–33)
MCHC RBC AUTO-ENTMCNC: 29.6 G/DL (ref 31.5–35.7)
MCV RBC AUTO: 90.5 FL (ref 79–97)
MONOCYTES # BLD AUTO: 1.23 10*3/MM3 (ref 0.1–0.9)
MONOCYTES NFR BLD AUTO: 8.4 % (ref 5–12)
NEUTROPHILS NFR BLD AUTO: 12.29 10*3/MM3 (ref 1.7–7)
NEUTROPHILS NFR BLD AUTO: 83.9 % (ref 42.7–76)
NITRITE UR QL STRIP: NEGATIVE
NT-PROBNP SERPL-MCNC: 122 PG/ML (ref 0–450)
PH UR STRIP.AUTO: 7.5 [PH] (ref 5–8)
PLATELET # BLD AUTO: 193 10*3/MM3 (ref 140–450)
PMV BLD AUTO: 9.3 FL (ref 6–12)
POTASSIUM SERPL-SCNC: 4.7 MMOL/L (ref 3.5–5.2)
PROT SERPL-MCNC: 6.6 G/DL (ref 6–8.5)
PROT UR QL STRIP: ABNORMAL
RBC # BLD AUTO: 4.4 10*6/MM3 (ref 3.77–5.28)
RHINOVIRUS RNA SPEC NAA+PROBE: NOT DETECTED
RHINOVIRUS RNA SPEC NAA+PROBE: NOT DETECTED
RSV RNA NPH QL NAA+NON-PROBE: NOT DETECTED
SARS-COV-2 RNA RESP QL NAA+PROBE: NOT DETECTED
SODIUM SERPL-SCNC: 134 MMOL/L (ref 136–145)
SP GR UR STRIP: 1.02 (ref 1–1.03)
TROPONIN T SERPL HS-MCNC: <6 NG/L
UROBILINOGEN UR QL STRIP: ABNORMAL
WBC NRBC COR # BLD AUTO: 14.65 10*3/MM3 (ref 3.4–10.8)

## 2025-01-30 PROCEDURE — 81003 URINALYSIS AUTO W/O SCOPE: CPT

## 2025-01-30 PROCEDURE — 87637 SARSCOV2&INF A&B&RSV AMP PRB: CPT

## 2025-01-30 PROCEDURE — 87040 BLOOD CULTURE FOR BACTERIA: CPT

## 2025-01-30 PROCEDURE — 87641 MR-STAPH DNA AMP PROBE: CPT

## 2025-01-30 PROCEDURE — 93005 ELECTROCARDIOGRAM TRACING: CPT

## 2025-01-30 PROCEDURE — 36415 COLL VENOUS BLD VENIPUNCTURE: CPT

## 2025-01-30 PROCEDURE — 71275 CT ANGIOGRAPHY CHEST: CPT

## 2025-01-30 PROCEDURE — 99285 EMERGENCY DEPT VISIT HI MDM: CPT

## 2025-01-30 PROCEDURE — 25010000002 CEFEPIME PER 500 MG

## 2025-01-30 PROCEDURE — 84484 ASSAY OF TROPONIN QUANT: CPT

## 2025-01-30 PROCEDURE — 83605 ASSAY OF LACTIC ACID: CPT

## 2025-01-30 PROCEDURE — 25510000001 IOPAMIDOL PER 1 ML: Performed by: EMERGENCY MEDICINE

## 2025-01-30 PROCEDURE — 80053 COMPREHEN METABOLIC PANEL: CPT

## 2025-01-30 PROCEDURE — 85025 COMPLETE CBC W/AUTO DIFF WBC: CPT

## 2025-01-30 PROCEDURE — 0202U NFCT DS 22 TRGT SARS-COV-2: CPT

## 2025-01-30 PROCEDURE — 93010 ELECTROCARDIOGRAM REPORT: CPT | Performed by: INTERNAL MEDICINE

## 2025-01-30 PROCEDURE — G0378 HOSPITAL OBSERVATION PER HR: HCPCS

## 2025-01-30 PROCEDURE — 81025 URINE PREGNANCY TEST: CPT | Performed by: EMERGENCY MEDICINE

## 2025-01-30 PROCEDURE — 83880 ASSAY OF NATRIURETIC PEPTIDE: CPT

## 2025-01-30 RX ORDER — BISACODYL 10 MG
10 SUPPOSITORY, RECTAL RECTAL DAILY PRN
Status: DISCONTINUED | OUTPATIENT
Start: 2025-01-30 | End: 2025-02-02 | Stop reason: HOSPADM

## 2025-01-30 RX ORDER — POLYETHYLENE GLYCOL 3350 17 G/17G
17 POWDER, FOR SOLUTION ORAL DAILY PRN
Status: DISCONTINUED | OUTPATIENT
Start: 2025-01-30 | End: 2025-02-02 | Stop reason: HOSPADM

## 2025-01-30 RX ORDER — SODIUM CHLORIDE 0.9 % (FLUSH) 0.9 %
10 SYRINGE (ML) INJECTION AS NEEDED
Status: DISCONTINUED | OUTPATIENT
Start: 2025-01-30 | End: 2025-02-02 | Stop reason: HOSPADM

## 2025-01-30 RX ORDER — SODIUM CHLORIDE 9 MG/ML
40 INJECTION, SOLUTION INTRAVENOUS AS NEEDED
Status: DISCONTINUED | OUTPATIENT
Start: 2025-01-30 | End: 2025-02-02 | Stop reason: HOSPADM

## 2025-01-30 RX ORDER — IOPAMIDOL 755 MG/ML
100 INJECTION, SOLUTION INTRAVASCULAR
Status: COMPLETED | OUTPATIENT
Start: 2025-01-30 | End: 2025-01-30

## 2025-01-30 RX ORDER — IPRATROPIUM BROMIDE AND ALBUTEROL SULFATE 2.5; .5 MG/3ML; MG/3ML
3 SOLUTION RESPIRATORY (INHALATION) ONCE
Status: COMPLETED | OUTPATIENT
Start: 2025-01-30 | End: 2025-01-30

## 2025-01-30 RX ORDER — ACETAMINOPHEN 325 MG/1
650 TABLET ORAL EVERY 4 HOURS PRN
Status: DISCONTINUED | OUTPATIENT
Start: 2025-01-30 | End: 2025-02-02 | Stop reason: HOSPADM

## 2025-01-30 RX ORDER — BISACODYL 5 MG/1
5 TABLET, DELAYED RELEASE ORAL DAILY PRN
Status: DISCONTINUED | OUTPATIENT
Start: 2025-01-30 | End: 2025-02-02 | Stop reason: HOSPADM

## 2025-01-30 RX ORDER — ACETAMINOPHEN 160 MG/5ML
650 SOLUTION ORAL EVERY 4 HOURS PRN
Status: DISCONTINUED | OUTPATIENT
Start: 2025-01-30 | End: 2025-02-02 | Stop reason: HOSPADM

## 2025-01-30 RX ORDER — VANCOMYCIN 2 GRAM/500 ML IN 0.9 % SODIUM CHLORIDE INTRAVENOUS
20 ONCE
Status: DISCONTINUED | OUTPATIENT
Start: 2025-01-30 | End: 2025-01-31

## 2025-01-30 RX ORDER — AMOXICILLIN 250 MG
2 CAPSULE ORAL 2 TIMES DAILY PRN
Status: DISCONTINUED | OUTPATIENT
Start: 2025-01-30 | End: 2025-02-02 | Stop reason: HOSPADM

## 2025-01-30 RX ORDER — IPRATROPIUM BROMIDE AND ALBUTEROL SULFATE 2.5; .5 MG/3ML; MG/3ML
3 SOLUTION RESPIRATORY (INHALATION)
Status: DISCONTINUED | OUTPATIENT
Start: 2025-01-31 | End: 2025-02-02 | Stop reason: HOSPADM

## 2025-01-30 RX ORDER — ACETAMINOPHEN 650 MG/1
650 SUPPOSITORY RECTAL EVERY 4 HOURS PRN
Status: DISCONTINUED | OUTPATIENT
Start: 2025-01-30 | End: 2025-02-02 | Stop reason: HOSPADM

## 2025-01-30 RX ORDER — SODIUM CHLORIDE 0.9 % (FLUSH) 0.9 %
10 SYRINGE (ML) INJECTION EVERY 12 HOURS SCHEDULED
Status: DISCONTINUED | OUTPATIENT
Start: 2025-01-30 | End: 2025-02-02 | Stop reason: HOSPADM

## 2025-01-30 RX ORDER — GUAIFENESIN/DEXTROMETHORPHAN 100-10MG/5
5 SYRUP ORAL EVERY 4 HOURS PRN
Status: DISCONTINUED | OUTPATIENT
Start: 2025-01-30 | End: 2025-02-02 | Stop reason: HOSPADM

## 2025-01-30 RX ORDER — NITROGLYCERIN 0.4 MG/1
0.4 TABLET SUBLINGUAL
Status: DISCONTINUED | OUTPATIENT
Start: 2025-01-30 | End: 2025-02-02 | Stop reason: HOSPADM

## 2025-01-30 RX ADMIN — ACETAMINOPHEN 650 MG: 325 TABLET, FILM COATED ORAL at 22:22

## 2025-01-30 RX ADMIN — IPRATROPIUM BROMIDE AND ALBUTEROL SULFATE 3 ML: .5; 3 SOLUTION RESPIRATORY (INHALATION) at 15:28

## 2025-01-30 RX ADMIN — IOPAMIDOL 100 ML: 755 INJECTION, SOLUTION INTRAVENOUS at 16:06

## 2025-01-30 RX ADMIN — CEFEPIME 2000 MG: 2 INJECTION, POWDER, FOR SOLUTION INTRAVENOUS at 17:22

## 2025-01-30 NOTE — ED NOTES
Pt requesting to be transferred to [facility] via POV so she could stop at home prior to going to [facility], reminded pt she was requiring O2 support and she needed to go straight to F with no other stops. Pt stated understanding. KAYLEY RUBIO and Primary RN Elissa made aware.

## 2025-01-30 NOTE — FSED PROVIDER NOTE
[facility]STAND[facility] ED / URGENT CARE    EMERGENCY DEPARTMENT ENCOUNTER    Room Number:    Date seen:  2025  Time seen: 15:06 EST  PCP: Ellie Elizabeth APRN  Historian: Patient    HPI:  Chief complaint: Shortness of breath  Context:Jacqueline Turcios is a 34 y.o. female with history of asthma who presents to the ED with c/o shortness of breath.  Patient reports that she was admitted to the hospital 8 days ago for rhinovirus.  She reports that she had to be placed on oxygen due to her oxygen being in the 70s.  She states that she was there for 3 days and they were able to get her oxygen to improve somewhat.  She reports that they did a walking test and her oxygen stayed at 89 so they did not send her home with oxygen.  She reports that she has been having shortness of breath and a fever since waking up.  She reports that her fever was 102 prior to arrival.  She states that she did take some ibuprofen for this.  Patient reports that she is an every day smoker but has cut back due to her recent hospital stay.  Upon arrival the patient was noted to have an oxygen saturation of 82% was placed on 3 L per nasal cannula.  She states that prior to today she was feeling much better.    Timing: Constant  Duration: today  Intensity/Severity: Severe  Associated Symptoms: Shortness of breath, fever      MEDICAL RECORD REVIEW  Asthma    ALLERGIES  Penicillins    PAST MEDICAL HISTORY  Active Ambulatory Problems     Diagnosis Date Noted    Acute hypoxic respiratory failure 2025    Acute bronchitis due to Rhinovirus 2025     Resolved Ambulatory Problems     Diagnosis Date Noted    Sebaceous cyst 2024     Past Medical History:   Diagnosis Date    Asthma     Kidney stone        PAST SURGICAL HISTORY  Past Surgical History:   Procedure Laterality Date     SECTION      CHOLECYSTECTOMY      DILATION AND CURETTAGE, DIAGNOSTIC / THERAPEUTIC      EXCISION LESION N/A 2024    Procedure:  EXCISION of cyst from psterior neck, prone position;  Surgeon: Raul Lawson MD;  Location: [facility] MAIN OR;  Service: General;  Laterality: N/A;    URETERAL STENT INSERTION         FAMILY HISTORY  History reviewed. No pertinent family history.    SOCIAL HISTORY  Social History     Socioeconomic History    Marital status: Single   Tobacco Use    Smoking status: Every Day     Current packs/day: 1.00     Average packs/day: 1 pack/day for 12.1 years (12.1 ttl pk-yrs)     Types: Cigarettes     Start date: 2013     Passive exposure: Current    Smokeless tobacco: Never   Vaping Use    Vaping status: Never Used   Substance and Sexual Activity    Alcohol use: No    Drug use: Never    Sexual activity: Defer       REVIEW OF SYSTEMS  Review of Systems    All systems reviewed and negative except for those discussed in HPI.     PHYSICAL EXAM    I have reviewed the triage vital signs and nursing notes.    ED Triage Vitals   Temp Heart Rate Resp BP SpO2   01/30/25 1422 01/30/25 1413 01/30/25 1422 01/30/25 1422 01/30/25 1413   98.7 °F (37.1 °C) 120 22 144/77 (!) 82 %      Temp src Heart Rate Source Patient Position BP Location FiO2 (%)   01/30/25 1422 -- -- -- --   Oral           Physical Exam  Constitutional:       Appearance: She is well-developed. She is obese.   HENT:      Nose: Nose normal.      Mouth/Throat:      Mouth: Mucous membranes are moist.   Eyes:      Pupils: Pupils are equal, round, and reactive to light.   Cardiovascular:      Rate and Rhythm: Regular rhythm. Tachycardia present.      Pulses: Normal pulses.      Heart sounds: Normal heart sounds.   Pulmonary:      Effort: Pulmonary effort is normal. Tachypnea present.      Breath sounds: Rhonchi present.   Musculoskeletal:         General: Normal range of motion.      Cervical back: Normal range of motion.   Skin:     General: Skin is warm.   Neurological:      General: No focal deficit present.      Mental Status: She is alert.   Psychiatric:          Mood and Affect: Mood normal.         Behavior: Behavior normal.         Vital signs and nursing notes reviewed.        LAB RESULTS  Recent Results (from the past 24 hours)   COVID-19 and FLU A/B PCR, 1 HR TAT - Swab, Nasopharynx    Collection Time: 01/30/25  2:25 PM    Specimen: Nasopharynx; Swab   Result Value Ref Range    COVID19 Not Detected Not Detected - Ref. Range    Influenza A PCR Not Detected Not Detected    Influenza B PCR Not Detected Not Detected   RSV PCR - Swab, Nasopharynx    Collection Time: 01/30/25  2:25 PM    Specimen: Nasopharynx; Swab   Result Value Ref Range    RSV, PCR Not Detected Not Detected   Comprehensive Metabolic Panel    Collection Time: 01/30/25  3:04 PM    Specimen: Blood   Result Value Ref Range    Glucose 118 (H) 65 - 99 mg/dL    BUN 5 (L) 6 - 20 mg/dL    Creatinine 0.69 0.57 - 1.00 mg/dL    Sodium 134 (L) 136 - 145 mmol/L    Potassium 4.7 3.5 - 5.2 mmol/L    Chloride 96 (L) 98 - 107 mmol/L    CO2 34.0 (H) 22.0 - 29.0 mmol/L    Calcium 9.5 8.6 - 10.5 mg/dL    Total Protein 6.6 6.0 - 8.5 g/dL    Albumin 3.6 3.5 - 5.2 g/dL    ALT (SGPT) 32 1 - 33 U/L    AST (SGOT) 19 1 - 32 U/L    Alkaline Phosphatase 126 (H) 39 - 117 U/L    Total Bilirubin 0.4 0.0 - 1.2 mg/dL    Globulin 3.0 gm/dL    A/G Ratio 1.2 g/dL    BUN/Creatinine Ratio 7.2 7.0 - 25.0    Anion Gap 4.0 (L) 5.0 - 15.0 mmol/L    eGFR 117.0 >60.0 mL/min/1.73   BNP    Collection Time: 01/30/25  3:04 PM    Specimen: Blood   Result Value Ref Range    proBNP 122.0 0.0 - 450.0 pg/mL   High Sensitivity Troponin T    Collection Time: 01/30/25  3:04 PM    Specimen: Blood   Result Value Ref Range    HS Troponin T <6 <14 ng/L   CBC Auto Differential    Collection Time: 01/30/25  3:04 PM    Specimen: Blood   Result Value Ref Range    WBC 14.65 (H) 3.40 - 10.80 10*3/mm3    RBC 4.40 3.77 - 5.28 10*6/mm3    Hemoglobin 11.8 (L) 12.0 - 15.9 g/dL    Hematocrit 39.8 34.0 - 46.6 %    MCV 90.5 79.0 - 97.0 fL    MCH 26.8 26.6 - 33.0 pg    MCHC 29.6  (L) 31.5 - 35.7 g/dL    RDW 14.2 12.3 - 15.4 %    RDW-SD 47.3 37.0 - 54.0 fl    MPV 9.3 6.0 - 12.0 fL    Platelets 193 140 - 450 10*3/mm3    Neutrophil % 83.9 (H) 42.7 - 76.0 %    Lymphocyte % 7.2 (L) 19.6 - 45.3 %    Monocyte % 8.4 5.0 - 12.0 %    Eosinophil % 0.1 (L) 0.3 - 6.2 %    Basophil % 0.1 0.0 - 1.5 %    Immature Grans % 0.3 0.0 - 0.5 %    Neutrophils, Absolute 12.29 (H) 1.70 - 7.00 10*3/mm3    Lymphocytes, Absolute 1.06 0.70 - 3.10 10*3/mm3    Monocytes, Absolute 1.23 (H) 0.10 - 0.90 10*3/mm3    Eosinophils, Absolute 0.02 0.00 - 0.40 10*3/mm3    Basophils, Absolute 0.01 0.00 - 0.20 10*3/mm3    Immature Grans, Absolute 0.04 0.00 - 0.05 10*3/mm3   Pregnancy, Urine - Urine, Clean Catch    Collection Time: 01/30/25  3:15 PM    Specimen: Urine, Clean Catch   Result Value Ref Range    HCG, Urine QL Negative Negative   Urinalysis without microscopic (no culture) - Urine, Clean Catch    Collection Time: 01/30/25  3:15 PM    Specimen: Urine, Clean Catch   Result Value Ref Range    Color, UA Yellow Yellow, Straw    Appearance, UA Slightly Cloudy (A) Clear    pH, UA 7.5 5.0 - 8.0    Specific Gravity, UA 1.020 1.005 - 1.030    Glucose, UA Negative Negative    Ketones, UA Negative Negative    Bilirubin, UA Negative Negative    Blood, UA Negative Negative    Protein, UA 30 mg/dL (1+) (A) Negative    Leuk Esterase, UA Negative Negative    Nitrite, UA Negative Negative    Urobilinogen, UA >=8.0 E.U./dL (A) 0.2 - 1.0 E.U./dL   ECG 12 Lead ED Triage Standing Order; SOA    Collection Time: 01/30/25  3:24 PM   Result Value Ref Range    QT Interval 351 ms    QTC Interval 444 ms   Lactic Acid, Plasma    Collection Time: 01/30/25  4:50 PM    Specimen: Arm, Left; Blood   Result Value Ref Range    Lactate 1.4 0.5 - 2.0 mmol/L       Ordered the above labs and independently reviewed the results.      RADIOLOGY RESULTS  CT Angiogram Chest Pulmonary Embolism    Result Date: 1/30/2025  CT ANGIOGRAM CHEST PULMONARY EMBOLISM Date of  Exam: 1/30/2025 3:50 PM EST Indication: shortness of breath, hypoxia. Comparison: CT chest 1/19/2025. AP chest 1/19/2025. Technique: Axial CT images were obtained of the chest after the uneventful intravenous administration of iodinated contrast utilizing pulmonary embolism protocol.  In addition, a 3-D volume rendered image was created for interpretation.  Sagittal and coronal reconstructions were performed.  Automated exposure control and iterative reconstruction methods were used. Findings: Suboptimal contrast opacification of the pulmonary arteries. No pulmonary embolism is identified. Heart size is within normal limits. No significant coronary calcifications. No pericardial effusion or pleural effusion. Patchy peribronchiolar nodular densities are seen within the posterior right upper lobe and within the posterior medial bilateral lower lobes with relative sparing of the right middle lobe and left upper lobe. Linear subsegmental atelectasis is demonstrated within the left upper lobe and bilateral lower lobes. No acute or suspicious osseous abnormalities. Bilateral breast implants are noted. The liver is steatotic. Cholecystectomy changes are present. Spleen size is enlarged at 15.7 cm but appears stable. Remainder of the imaged upper abdominal organs have a normal appearance.     Impression: 1. No pulmonary embolism is seen. 2. Patchy peribronchiolar nodular densities within both lungs suggestive of bronchopneumonia 3. Stable splenic enlargement. 4. Hepatic steatosis. Electronically Signed: Esthela Begum MD  1/30/2025 4:12 PM EST  Workstation ID: [id number]        I ordered the above noted radiological studies. Independently reviewed by me and discussed with radiologist.  See dictation above for official radiology interpretation.      Orders placed during this visit:  Orders Placed This Encounter   Procedures    COVID-19 and FLU A/B PCR, 1 HR TAT - Swab, Nasopharynx    RSV PCR - Swab, Nasopharynx    Respiratory  Culture - Sputum, Cough    Blood Culture - Blood,    Blood Culture - Blood,    Respiratory Panel PCR w/COVID-19(SARS-CoV-2) LOUIE/GIOVANNY/TOMMY/PAD/COR/JANET In-House, NP Swab in UTM/VTM, 2 HR TAT - Swab, Nasopharynx    MRSA Screen, PCR (Inpatient) - Swab, Nares    CT Angiogram Chest Pulmonary Embolism    Comprehensive Metabolic Panel    BNP    High Sensitivity Troponin T    hCG, Serum, Qualitative    CBC Auto Differential    Pregnancy, Urine - Urine, Clean Catch    Urinalysis without microscopic (no culture) - Urine, Clean Catch    Lactic Acid, Plasma    NPO Diet NPO Type: Strict NPO    Undress & Gown    Continuous Pulse Oximetry    Vital Signs    Oxygen Therapy- Nasal Cannula; Titrate 1-6 LPM Per SpO2; 90 - 95%    ECG 12 Lead ED Triage Standing Order; SOA    Insert Peripheral IV    Initiate Observation Status    CBC & Differential    ED Acknowledgement Form Needed;           PROCEDURES    Procedures        MEDICATIONS GIVEN IN ER    Medications   sodium chloride 0.9 % flush 10 mL (has no administration in time range)   Pharmacy to dose vancomycin (has no administration in time range)   vancomycin IVPB 2000 mg in 0.9% Sodium Chloride 500 mL (has no administration in time range)   ipratropium-albuterol (DUO-NEB) nebulizer solution 3 mL (3 mL Nebulization Given 1/30/25 1528)   iopamidol (ISOVUE-370) 76 % injection 100 mL (100 mL Intravenous Given 1/30/25 1606)   cefepime 2000 mg IVPB in 100 mL NS (MBP) (0 mg Intravenous Stopped 1/30/25 1753)         PROGRESS, DATA ANALYSIS, CONSULTS, AND MEDICAL DECISION MAKING    All labs and radiology studies have been independently reviewed by me.     ED Course as of 01/30/25 1811   Thu Jan 30, 2025   1620 Hospitalist called for admission [KJ]   1659 Second call placed to hospitalist.     Due to poor venous access nursing staff was unable to obtain second troponin or second set of cultures.  [KJ]   1718 Patient states that she does not want to go by ambulance. I have told her that I cannot  recommend going by private vehicle as she is requiring oxygen. I discussed the risks and benefits of going by private vehicle.     I have spoken with Dr. Daniel from the hospitalist group for admission and he will be accepting. I asked the patient to think about going by ambulance while she is waiting for a bed at [facility] [place], operated by [facility]. [KJ]      ED Course User Index  [KJ] Drea Carter APRN       AS OF 18:11 EST VITALS:    BP - 107/67  HR - 97  TEMP - 98.7 °F (37.1 °C) (Oral)  02 SATS - 94%    Medical Decision Making  Patient is a 34-year-old female who presents today with shortness of breath.  Patient an IV established and blood work was obtained. Presentation not consistent with acute cardiac etiologies to include ACS (non ischemic ekg, unremarkable trop), CHF, pericardial effusion / tamponade . Presentation not consistent with acute respiratory etiologies to include acute PE (Wells low risk), pneumothorax , asthma, COPD exacerbation, allergic etiologies. Presentation also not consistent with non-cardiopulmonary causes to include toxidromes, metabolic etiologies such as acidemia or electrolyte derangements, sepsis, neurologic causes (i.e. demyelinating diseases).  On the patient's CT scan she was noted to have bronchopneumonia.  I spoke with the hospitalist group Dr. Daniel who will be accepting for admission.  At time of admission the patient states that she does not want a go by ambulance that she wants to be able to go home and pack her belongings for the admission.  I strongly discouraged the patient as she is required oxygen.  I discussed the risk and benefits of going by private vehicle.  I again strongly encouraged her that she needs to go by ambulance due to being hypoxic.  Informed the patient that if she does go by private vehicle we will have to remove her IV and have her sign the refusal form.  At the time of writing this note the patient is currently waiting for a room to be clean and is still wanted to go  by private vehicle.    Problems Addressed:  Bronchopneumonia: complicated acute illness or injury  Hypoxia: complicated acute illness or injury    Amount and/or Complexity of Data Reviewed  Labs: ordered.  Radiology: ordered.  ECG/medicine tests: ordered.    Risk  OTC drugs.  Prescription drug management.  Decision regarding hospitalization.          DIAGNOSIS  Final diagnoses:   Bronchopneumonia   Hypoxia       New Medications Ordered This Visit   Medications    sodium chloride 0.9 % flush 10 mL    ipratropium-albuterol (DUO-NEB) nebulizer solution 3 mL    iopamidol (ISOVUE-370) 76 % injection 100 mL    Pharmacy to dose vancomycin    cefepime 2000 mg IVPB in 100 mL NS (MBP)    vancomycin IVPB 2000 mg in 0.9% Sodium Chloride 500 mL           I performed hand hygiene on entry into the pt room and upon exit.      Part of this note may be an electronic transcription/translation of spoken language to printed text using the Dragon Dictation System.     Appropriate PPE worn during exam.    Dictated utilizing Dragon dictation     Note Disclaimer: At [facility], we believe that sharing information builds trust and better relationships. You are receiving this note because you recently visited [facility]. It is possible you will see health information before a provider has talked with you about it. This kind of information can be easy to misunderstand. To help you fully understand what it means for your health, we urge you to discuss this note with your provider.

## 2025-01-31 LAB
ALBUMIN SERPL-MCNC: 3.3 G/DL (ref 3.5–5.2)
ALBUMIN/GLOB SERPL: 1.1 G/DL
ALP SERPL-CCNC: 114 U/L (ref 39–117)
ALT SERPL W P-5'-P-CCNC: 30 U/L (ref 1–33)
ANION GAP SERPL CALCULATED.3IONS-SCNC: 8.8 MMOL/L (ref 5–15)
AST SERPL-CCNC: 19 U/L (ref 1–32)
BASOPHILS # BLD AUTO: 0.02 10*3/MM3 (ref 0–0.2)
BASOPHILS NFR BLD AUTO: 0.2 % (ref 0–1.5)
BILIRUB SERPL-MCNC: 0.3 MG/DL (ref 0–1.2)
BUN SERPL-MCNC: 7 MG/DL (ref 6–20)
BUN/CREAT SERPL: 10.9 (ref 7–25)
CALCIUM SPEC-SCNC: 9 MG/DL (ref 8.6–10.5)
CHLORIDE SERPL-SCNC: 98 MMOL/L (ref 98–107)
CO2 SERPL-SCNC: 28.2 MMOL/L (ref 22–29)
CREAT SERPL-MCNC: 0.64 MG/DL (ref 0.57–1)
DEPRECATED RDW RBC AUTO: 47.7 FL (ref 37–54)
EGFRCR SERPLBLD CKD-EPI 2021: 119.1 ML/MIN/1.73
EOSINOPHIL # BLD AUTO: 0.04 10*3/MM3 (ref 0–0.4)
EOSINOPHIL NFR BLD AUTO: 0.3 % (ref 0.3–6.2)
ERYTHROCYTE [DISTWIDTH] IN BLOOD BY AUTOMATED COUNT: 14.5 % (ref 12.3–15.4)
GLOBULIN UR ELPH-MCNC: 3 GM/DL
GLUCOSE SERPL-MCNC: 135 MG/DL (ref 65–99)
HCT VFR BLD AUTO: 36.4 % (ref 34–46.6)
HGB BLD-MCNC: 10.6 G/DL (ref 12–15.9)
IMM GRANULOCYTES # BLD AUTO: 0.05 10*3/MM3 (ref 0–0.05)
IMM GRANULOCYTES NFR BLD AUTO: 0.4 % (ref 0–0.5)
L PNEUMO1 AG UR QL IA: NEGATIVE
LYMPHOCYTES # BLD AUTO: 1.65 10*3/MM3 (ref 0.7–3.1)
LYMPHOCYTES NFR BLD AUTO: 13.7 % (ref 19.6–45.3)
MCH RBC QN AUTO: 26.5 PG (ref 26.6–33)
MCHC RBC AUTO-ENTMCNC: 29.1 G/DL (ref 31.5–35.7)
MCV RBC AUTO: 91 FL (ref 79–97)
MONOCYTES # BLD AUTO: 0.98 10*3/MM3 (ref 0.1–0.9)
MONOCYTES NFR BLD AUTO: 8.1 % (ref 5–12)
MRSA DNA SPEC QL NAA+PROBE: NORMAL
NEUTROPHILS NFR BLD AUTO: 77.3 % (ref 42.7–76)
NEUTROPHILS NFR BLD AUTO: 9.33 10*3/MM3 (ref 1.7–7)
NRBC BLD AUTO-RTO: 0 /100 WBC (ref 0–0.2)
PLATELET # BLD AUTO: 193 10*3/MM3 (ref 140–450)
PMV BLD AUTO: 10 FL (ref 6–12)
POTASSIUM SERPL-SCNC: 3.9 MMOL/L (ref 3.5–5.2)
PROT SERPL-MCNC: 6.3 G/DL (ref 6–8.5)
RBC # BLD AUTO: 4 10*6/MM3 (ref 3.77–5.28)
S PNEUM AG SPEC QL LA: NEGATIVE
SODIUM SERPL-SCNC: 135 MMOL/L (ref 136–145)
WBC NRBC COR # BLD AUTO: 12.07 10*3/MM3 (ref 3.4–10.8)

## 2025-01-31 PROCEDURE — 94640 AIRWAY INHALATION TREATMENT: CPT

## 2025-01-31 PROCEDURE — 94799 UNLISTED PULMONARY SVC/PX: CPT

## 2025-01-31 PROCEDURE — 94761 N-INVAS EAR/PLS OXIMETRY MLT: CPT

## 2025-01-31 PROCEDURE — 85025 COMPLETE CBC W/AUTO DIFF WBC: CPT | Performed by: STUDENT IN AN ORGANIZED HEALTH CARE EDUCATION/TRAINING PROGRAM

## 2025-01-31 PROCEDURE — 87899 AGENT NOS ASSAY W/OPTIC: CPT | Performed by: STUDENT IN AN ORGANIZED HEALTH CARE EDUCATION/TRAINING PROGRAM

## 2025-01-31 PROCEDURE — 94664 DEMO&/EVAL PT USE INHALER: CPT

## 2025-01-31 PROCEDURE — 80053 COMPREHEN METABOLIC PANEL: CPT | Performed by: STUDENT IN AN ORGANIZED HEALTH CARE EDUCATION/TRAINING PROGRAM

## 2025-01-31 PROCEDURE — G0378 HOSPITAL OBSERVATION PER HR: HCPCS

## 2025-01-31 PROCEDURE — 87449 NOS EACH ORGANISM AG IA: CPT | Performed by: STUDENT IN AN ORGANIZED HEALTH CARE EDUCATION/TRAINING PROGRAM

## 2025-01-31 PROCEDURE — 25010000002 METHYLPREDNISOLONE PER 40 MG: Performed by: INTERNAL MEDICINE

## 2025-01-31 PROCEDURE — 25010000002 VANCOMYCIN 2-0.9 GM/500ML-% SOLUTION

## 2025-01-31 PROCEDURE — 25010000002 CEFEPIME PER 500 MG: Performed by: STUDENT IN AN ORGANIZED HEALTH CARE EDUCATION/TRAINING PROGRAM

## 2025-01-31 RX ORDER — LAMOTRIGINE 25 MG/1
25 TABLET ORAL 2 TIMES DAILY
Status: DISCONTINUED | OUTPATIENT
Start: 2025-01-31 | End: 2025-02-02 | Stop reason: HOSPADM

## 2025-01-31 RX ORDER — MONTELUKAST SODIUM 10 MG/1
10 TABLET ORAL NIGHTLY
COMMUNITY

## 2025-01-31 RX ORDER — LAMOTRIGINE 25 MG/1
25 TABLET ORAL DAILY
Status: DISCONTINUED | OUTPATIENT
Start: 2025-01-31 | End: 2025-01-31

## 2025-01-31 RX ORDER — AZITHROMYCIN 250 MG/1
500 TABLET, FILM COATED ORAL
Status: DISCONTINUED | OUTPATIENT
Start: 2025-01-31 | End: 2025-01-31

## 2025-01-31 RX ORDER — METHYLPREDNISOLONE SODIUM SUCCINATE 40 MG/ML
40 INJECTION, POWDER, LYOPHILIZED, FOR SOLUTION INTRAMUSCULAR; INTRAVENOUS EVERY 8 HOURS SCHEDULED
Status: DISCONTINUED | OUTPATIENT
Start: 2025-01-31 | End: 2025-02-02 | Stop reason: HOSPADM

## 2025-01-31 RX ORDER — NICOTINE 21 MG/24HR
1 PATCH, TRANSDERMAL 24 HOURS TRANSDERMAL
Status: DISCONTINUED | OUTPATIENT
Start: 2025-01-31 | End: 2025-02-02 | Stop reason: HOSPADM

## 2025-01-31 RX ORDER — METHADONE HYDROCHLORIDE 10 MG/ML
177 CONCENTRATE ORAL DAILY
Status: DISCONTINUED | OUTPATIENT
Start: 2025-01-31 | End: 2025-02-02 | Stop reason: HOSPADM

## 2025-01-31 RX ORDER — ERGOCALCIFEROL 1.25 MG/1
50000 CAPSULE, LIQUID FILLED ORAL WEEKLY
Status: DISCONTINUED | OUTPATIENT
Start: 2025-02-04 | End: 2025-02-02 | Stop reason: HOSPADM

## 2025-01-31 RX ORDER — GABAPENTIN 100 MG/1
100 CAPSULE ORAL 3 TIMES DAILY
Status: DISCONTINUED | OUTPATIENT
Start: 2025-01-31 | End: 2025-02-02 | Stop reason: HOSPADM

## 2025-01-31 RX ORDER — PANTOPRAZOLE SODIUM 40 MG/1
40 TABLET, DELAYED RELEASE ORAL
Status: DISCONTINUED | OUTPATIENT
Start: 2025-02-01 | End: 2025-02-01

## 2025-01-31 RX ORDER — HYDROXYZINE HYDROCHLORIDE 25 MG/1
50 TABLET, FILM COATED ORAL EVERY 6 HOURS PRN
Status: DISCONTINUED | OUTPATIENT
Start: 2025-01-31 | End: 2025-01-31

## 2025-01-31 RX ORDER — MONTELUKAST SODIUM 10 MG/1
10 TABLET ORAL NIGHTLY
Status: DISCONTINUED | OUTPATIENT
Start: 2025-01-31 | End: 2025-02-02 | Stop reason: HOSPADM

## 2025-01-31 RX ORDER — METHADONE HYDROCHLORIDE 10 MG/1
160 TABLET ORAL DAILY
Status: DISCONTINUED | OUTPATIENT
Start: 2025-01-31 | End: 2025-01-31

## 2025-01-31 RX ORDER — METHOCARBAMOL 750 MG/1
750 TABLET, FILM COATED ORAL 3 TIMES DAILY
Status: DISCONTINUED | OUTPATIENT
Start: 2025-01-31 | End: 2025-02-02 | Stop reason: HOSPADM

## 2025-01-31 RX ORDER — HYDROXYZINE HYDROCHLORIDE 25 MG/1
25 TABLET, FILM COATED ORAL 3 TIMES DAILY PRN
Status: DISCONTINUED | OUTPATIENT
Start: 2025-01-31 | End: 2025-02-02 | Stop reason: HOSPADM

## 2025-01-31 RX ADMIN — CEFEPIME 2000 MG: 2 INJECTION, POWDER, FOR SOLUTION INTRAVENOUS at 01:43

## 2025-01-31 RX ADMIN — METHYLPREDNISOLONE SODIUM SUCCINATE 40 MG: 40 INJECTION, POWDER, FOR SOLUTION INTRAMUSCULAR; INTRAVENOUS at 21:57

## 2025-01-31 RX ADMIN — METHOCARBAMOL TABLETS 750 MG: 750 TABLET, COATED ORAL at 17:45

## 2025-01-31 RX ADMIN — Medication 10 ML: at 09:35

## 2025-01-31 RX ADMIN — METHOCARBAMOL TABLETS 750 MG: 750 TABLET, COATED ORAL at 09:31

## 2025-01-31 RX ADMIN — GABAPENTIN 100 MG: 100 CAPSULE ORAL at 16:46

## 2025-01-31 RX ADMIN — CEFEPIME 2000 MG: 2 INJECTION, POWDER, FOR SOLUTION INTRAVENOUS at 09:31

## 2025-01-31 RX ADMIN — ACETAMINOPHEN 650 MG: 325 TABLET, FILM COATED ORAL at 10:32

## 2025-01-31 RX ADMIN — GUAIFENESIN AND DEXTROMETHORPHAN 5 ML: 100; 10 SYRUP ORAL at 10:32

## 2025-01-31 RX ADMIN — HYDROXYZINE HYDROCHLORIDE 50 MG: 25 TABLET, FILM COATED ORAL at 01:13

## 2025-01-31 RX ADMIN — IPRATROPIUM BROMIDE AND ALBUTEROL SULFATE 3 ML: .5; 3 SOLUTION RESPIRATORY (INHALATION) at 16:05

## 2025-01-31 RX ADMIN — METHYLPREDNISOLONE SODIUM SUCCINATE 40 MG: 40 INJECTION, POWDER, FOR SOLUTION INTRAMUSCULAR; INTRAVENOUS at 10:38

## 2025-01-31 RX ADMIN — CEFEPIME 2000 MG: 2 INJECTION, POWDER, FOR SOLUTION INTRAVENOUS at 16:47

## 2025-01-31 RX ADMIN — AZITHROMYCIN 500 MG: 250 TABLET, FILM COATED ORAL at 09:31

## 2025-01-31 RX ADMIN — FLUOXETINE 40 MG: 20 CAPSULE ORAL at 09:31

## 2025-01-31 RX ADMIN — Medication 10 ML: at 01:47

## 2025-01-31 RX ADMIN — METHADONE HYDROCHLORIDE 177 MG: 10 CONCENTRATE ORAL at 10:32

## 2025-01-31 RX ADMIN — BREXPIPRAZOLE 2 MG: 1 TABLET ORAL at 16:47

## 2025-01-31 RX ADMIN — MONTELUKAST 10 MG: 10 TABLET, FILM COATED ORAL at 20:55

## 2025-01-31 RX ADMIN — LAMOTRIGINE 25 MG: 25 TABLET ORAL at 20:54

## 2025-01-31 RX ADMIN — GABAPENTIN 100 MG: 100 CAPSULE ORAL at 09:31

## 2025-01-31 RX ADMIN — LAMOTRIGINE 25 MG: 25 TABLET ORAL at 09:31

## 2025-01-31 RX ADMIN — GUAIFENESIN AND DEXTROMETHORPHAN 5 ML: 100; 10 SYRUP ORAL at 01:13

## 2025-01-31 RX ADMIN — NICOTINE 1 PATCH: 14 PATCH TRANSDERMAL at 18:30

## 2025-01-31 RX ADMIN — GABAPENTIN 100 MG: 100 CAPSULE ORAL at 20:54

## 2025-01-31 RX ADMIN — METHOCARBAMOL TABLETS 750 MG: 750 TABLET, COATED ORAL at 20:55

## 2025-01-31 RX ADMIN — IPRATROPIUM BROMIDE AND ALBUTEROL SULFATE 3 ML: .5; 3 SOLUTION RESPIRATORY (INHALATION) at 00:47

## 2025-01-31 RX ADMIN — Medication 10 ML: at 20:55

## 2025-01-31 NOTE — SIGNIFICANT NOTE
01/31/25 6854   Living Situation   Current Living Arrangements home   Potentially Unsafe Housing Conditions none   Food Insecurity   Within the past 12 months, you worried that your food would run out before you got the money to buy more. Never true   Within the past 12 months, the food you bought just didn't last and you didn't have money to get more. Never true   Transportation Needs   In the past 12 months, has lack of transportation kept you from medical appointments or from getting medications? no   In the past 12 months, has lack of transportation kept you from meetings, work, or from getting things needed for daily living? No   Utilities   In the past 12 months has the electric, gas, oil, or water company threatened to shut off services in your home? No   Abuse Screen (yes response referral indicated)   Feels Unsafe at Home or Work/School no   Feels Threatened by Someone no   Does Anyone Try to Keep You From Having Contact with Others or Doing Things Outside Your Home? no   Physical Signs of Abuse Present no   Financial Resource Strain   How hard is it for you to pay for the very basics like food, housing, medical care, and heating? Not very   Employment   Do you want help finding or keeping work or a job? I do not need or want help   Family and Community Support   If for any reason you need help with day-to-day activities such as bathing, preparing meals, shopping, managing finances, etc., do you get the help you need? I don't need any help   How often do you feel lonely or isolated from those around you? Never   Education   Preferred Language English   Do you want help with school or training? For example, starting or completing job training or getting a high school diploma, GED or equivalent No   Physical Activity   On average, how many days per week do you engage in moderate to strenuous exercise (like a brisk walk)? 7 days   On average, how many minutes do you engage in exercise at this level? 60 min    Number of minutes of exercise per week 420   Alcohol Use   Q1: How often do you have a drink containing alcohol? Never   Q2: How many drinks containing alcohol do you have on a typical day when you are drinking? None   Q3: How often do you have six or more drinks on one occasion? Never   Stress   Do you feel stress - tense, restless, nervous, or anxious, or unable to sleep at night because your mind is troubled all the time - these days? Not at all   Mental Health   Little interest or pleasure in doing things Not at all   Feeling down, depressed, or hopeless Not at all   Disabilities   Difficulty Concentrating, Remembering or Making Decisions no   Difficulty Managing Errands Independently no

## 2025-01-31 NOTE — PROGRESS NOTES
[facility] MEDICINE SERVICE  DAILY PROGRESS NOTE    NAME: Jacqueline Turcios  : 1990  MRN: [id number]      LOS: 0 days     PROVIDER OF SERVICE: Marlene Etienne MD    Chief Complaint: Bronchopneumonia    Subjective:     Interval History:    Patient seen and evaluated at bedside.   H&P, labs and imaging reviewed.  Patient smokes 1-1/2 pack/day for 4 years decreased to half a pack per day for 1 week.  Baseline is room air patient on 3 L per nasal cannula O2 sats 95%.  Bowel movement was 2 days ago.  Treatment plan discussed with patient. All questions addressed.     Review of Systems:   All 21 ROS were negative except mentioned above.    Objective:     Vital Signs  Temp:  [97.6 °F (36.4 °C)-98.7 °F (37.1 °C)] 97.6 °F (36.4 °C)  Heart Rate:  [] 93  Resp:  [13-22] 16  BP: (107-144)/(64-88) 121/76  Flow (L/min) (Oxygen Therapy):  [2-3] 3   Body mass index is 51.69 kg/m².    Physical Exam   General: Moderate respiratory distress, appears stated age  Neuro: Awake and alert, oriented x3, no focal deficits appreciated  Head: Atraumatic, normocephalic  HEENT: EOMI, anicteric, normal sclerae and conjunctivae, moist mucus membranes  Neck: supple, no lymphadenopathy  CV: RRR, soft heart sounds, no murmurs appreciated, no peripheral edema  Pulm: Decreased breath sounds,  increased work of breathing, wheezing on right lung posteriorly  Abd: Soft, nontender, nondistended  Skin: Warm, dry and intact  Psych: Appropriate mood and affect    Scheduled Meds   azithromycin, 500 mg, Oral, Q24H  cefepime, 2,000 mg, Intravenous, Q8H  FLUoxetine, 40 mg, Oral, Daily  gabapentin, 100 mg, Oral, TID  ipratropium-albuterol, 3 mL, Nebulization, Q6H - RT  lamoTRIgine, 25 mg, Oral, Daily  methadone, 160 mg, Oral, Daily  methocarbamol, 750 mg, Oral, TID  sodium chloride, 10 mL, Intravenous, Q12H  [START ON 2025] vitamin D, 50,000 Units, Oral, Weekly       PRN Meds     acetaminophen **OR** acetaminophen **OR** acetaminophen     senna-docusate sodium **AND** polyethylene glycol **AND** bisacodyl **AND** bisacodyl    Calcium Replacement - Follow Nurse / BPA Driven Protocol    guaiFENesin-dextromethorphan    hydrOXYzine    Magnesium Standard Dose Replacement - Follow Nurse / BPA Driven Protocol    nitroglycerin    Pharmacy to Dose Cefepime    Phosphorus Replacement - Follow Nurse / BPA Driven Protocol    Potassium Replacement - Follow Nurse / BPA Driven Protocol    sodium chloride    sodium chloride    sodium chloride   Infusions  Pharmacy to Dose Cefepime,           Diagnostic Data    Results from last 7 days   Lab Units 01/31/25  0142   WBC 10*3/mm3 12.07*   HEMOGLOBIN g/dL 10.6*   HEMATOCRIT % 36.4   PLATELETS 10*3/mm3 193   GLUCOSE mg/dL 135*   CREATININE mg/dL 0.64   BUN mg/dL 7   SODIUM mmol/L 135*   POTASSIUM mmol/L 3.9   AST (SGOT) U/L 19   ALT (SGPT) U/L 30   ALK PHOS U/L 114   BILIRUBIN mg/dL 0.3   ANION GAP mmol/L 8.8       CT Angiogram Chest Pulmonary Embolism    Result Date: 1/30/2025  Impression: 1. No pulmonary embolism is seen. 2. Patchy peribronchiolar nodular densities within both lungs suggestive of bronchopneumonia 3. Stable splenic enlargement. 4. Hepatic steatosis. Electronically Signed: Esthela Begum MD  1/30/2025 4:12 PM EST  Workstation ID: [id number]     Interval results reviewed.    Assessment/Plan:   Acute hypoxic respiratory failure  Bilateral pneumonia-healthcare associated pneumonia  Asthma  Morbid obesity  On methadone  Smoker  Hepatic steatosis  Splenomegaly    Continue patient on cefepime and  DC azithromycin as urine Legionella antigen negative and patient on methadone increased risk of QT interval prolongation.  MRSA DNA probe negative, blood culture pending  Steroids  Continue oxygen on 3 L per nasal cannula.  Baseline is room air  Patient received cefepime and vancomycin in the ER.  Patient with recent hospitalization  CT angiogram negative for PE    Restart methadone    Treatment plan discussed with RN.      VTE Prophylaxis:  Mechanical VTE prophylaxis orders are present.         Code status is   There are no questions and answers to display.       Plan for disposition:     Barriers to Discharge: Hypoxia  Anticipated Date of Discharge: 2/2/25  Place of Discharge:  home      Time: 40 minutes     Signature: Electronically signed by Marlene Etienne MD, 01/31/25, 09:33 EST.  [facility] Hospitalist Team

## 2025-01-31 NOTE — H&P
"[facility] Medicine Services  History & Physical    Patient Name: Jacqueline Turcios  : 1990  MRN: [id number]  Primary Care Physician:  Ellie Elizabeth APRN  Date of admission: 2025  Date and Time of Service: 2025 at 2140    Subjective      Chief Complaint: \"shortness of breath\"    History of Present Illness: Jacqueline Turcios is a 34 y.o. female with a PMH of PMH of MDD, Morbid Obesity, ongoing smoking around 8 years PPD, Asthma, well known to me from recent admission on  for acute hypoxia, rhinovirus infection  who presented to [facility] on 2025 with  worsening SOB since she woke up today in am, associated with greenish sputum production, subjective fever and chills. Was seen at [facility] ED and was hypoxic SPO2 82 % on room air, requiring 3 L NC. CT chest PE protocol performed showed patchy peribronchiolar nodular densities within both lungs suggestive of bronchopneumonia. The patient was subsequently transferred to [facility] for further evaluation and management.       Review of Systems   Constitutional:  Positive for chills and fever.   Respiratory:  Positive for cough, shortness of breath and wheezing. Negative for choking and chest tightness.    Cardiovascular:  Negative for chest pain and leg swelling.   Gastrointestinal:  Negative for abdominal pain and diarrhea.   Genitourinary:  Negative for dysuria.   Musculoskeletal:  Negative for back pain.   Psychiatric/Behavioral:  Negative for confusion.        Personal History     Past Medical History:   Diagnosis Date    Asthma     Kidney stone        Past Surgical History:   Procedure Laterality Date     SECTION      CHOLECYSTECTOMY      DILATION AND CURETTAGE, DIAGNOSTIC / THERAPEUTIC      EXCISION LESION N/A 2024    Procedure: EXCISION of cyst from psterior neck, prone position;  Surgeon: Raul Lawson MD;  Location: [facility] OR;  Service: General;  Laterality: N/A;    URETERAL STENT " INSERTION         Family History: family history is not on file. Otherwise pertinent FHx was reviewed and not pertinent to current issue.    Social History:  reports that she has been smoking cigarettes. She started smoking about 12 years ago. She has a 12.1 pack-year smoking history. She has been exposed to tobacco smoke. She has never used smokeless tobacco. She reports that she does not drink alcohol and does not use drugs.    Home Medications:  Prior to Admission Medications       Prescriptions Last Dose Informant Patient Reported? Taking?    FLUoxetine (PROzac) 20 MG capsule 1/30/2025  Yes Yes    Take 2 capsules by mouth Daily.    gabapentin (NEURONTIN) 100 MG capsule 1/29/2025  Yes Yes    Take 1 capsule by mouth 3 (Three) Times a Day.    hydrOXYzine (ATARAX) 25 MG tablet 1/29/2025  No Yes    Take 2 tablets by mouth Every 6 (Six) Hours As Needed for Anxiety.    ipratropium-albuterol (DUO-NEB) 0.5-2.5 mg/3 ml nebulizer 1/30/2025  No Yes    Take 3 mL by nebulization 4 (Four) Times a Day.    lamoTRIgine (LaMICtal) 25 MG tablet 1/30/2025  Yes Yes    Take 1 tablet by mouth Daily.    methadone (DOLOPHINE) 5 MG tablet 1/30/2025  Yes Yes    Take 177 mg by mouth Daily.    methocarbamol (ROBAXIN) 750 MG tablet 1/30/2025  Yes Yes    Take 1 tablet by mouth 3 (Three) Times a Day.    vitamin D (ERGOCALCIFEROL) 1.25 MG (15961 UT) capsule capsule 1/28/2025  Yes Yes    Take 1 capsule by mouth 1 (One) Time Per Week.    naloxone (NARCAN) 4 MG/0.1ML nasal spray   No No    Call 911. Don't prime. [place] in 1 nostril for overdose. Repeat in 2-3 minutes in other nostril if no or minimal breathing/responsiveness.              Allergies:  Allergies   Allergen Reactions    Penicillins Hives     Beta lactam allergy details  Antibiotic reaction: other (throat closed)  Age at reaction: infant  Dose to reaction time: (!) hours  Reason for antibiotic: unknown  Epinephrine required for reaction?: unknown  Tolerated antibiotics: amoxicillin,  cephalexin           Objective      Vitals:   Temp:  [98.4 °F (36.9 °C)-98.7 °F (37.1 °C)] 98.4 °F (36.9 °C)  Heart Rate:  [] 92  Resp:  [13-22] 16  BP: (107-144)/(67-88) 115/72  Body mass index is 51.69 kg/m².  Physical Exam  Constitutional:       General: She is not in acute distress.     Appearance: She is obese. She is not ill-appearing.   HENT:      Head: Normocephalic.      Nose: Nose normal.      Mouth/Throat:      Mouth: Mucous membranes are dry.   Eyes:      Extraocular Movements: Extraocular movements intact.      Pupils: Pupils are equal, round, and reactive to light.   Cardiovascular:      Rate and Rhythm: Normal rate and regular rhythm.      Pulses: Normal pulses.      Heart sounds: Normal heart sounds. No murmur heard.  Pulmonary:      Effort: Pulmonary effort is normal. No respiratory distress.      Breath sounds: Rales present. No rhonchi.   Abdominal:      General: Abdomen is flat. Bowel sounds are normal.      Palpations: Abdomen is soft.   Musculoskeletal:         General: Normal range of motion.      Cervical back: Neck supple.   Skin:     General: Skin is warm.   Neurological:      General: No focal deficit present.      Mental Status: She is alert and oriented to person, place, and time.   Psychiatric:         Behavior: Behavior normal.         Diagnostic Data:  Lab Results (last 24 hours)       Procedure Component Value Units Date/Time    MRSA Screen, PCR (Inpatient) - Swab, Nares [[id number]] Collected: 01/30/25 2229    Specimen: Swab from Nares Updated: 01/30/25 2246    Respiratory Panel PCR w/COVID-19(SARS-CoV-2) LOUIE/GIOVANNY/TOMMY/PAD/COR/JANET In-House, NP Swab in UTM/VTM, 2 HR TAT - Swab, Nasopharynx [[id number]] Collected: 01/30/25 2229    Specimen: Swab from Nasopharynx Updated: 01/30/25 2246    Respiratory Panel PCR w/COVID-19(SARS-CoV-2) LOUIE/GIOVANNY/TOMMY/PAD/COR/JANET In-House, NP Swab in UTM/VTM, 2 HR TAT - Swab, Nasopharynx [[id number]]  (Normal) Collected: 01/30/25 1646    Specimen: Swab from  Nasopharynx Updated: 01/30/25 2216     ADENOVIRUS, PCR Not Detected     Coronavirus 229E Not Detected     Coronavirus HKU1 Not Detected     Coronavirus NL63 Not Detected     Coronavirus OC43 Not Detected     COVID19 Not Detected     Human Metapneumovirus Not Detected     Human Rhinovirus/Enterovirus Not Detected     Influenza A PCR Not Detected     Influenza B PCR Not Detected     Parainfluenza Virus 1 Not Detected     Parainfluenza Virus 2 Not Detected     Parainfluenza Virus 3 Not Detected     Parainfluenza Virus 4 Not Detected     RSV, PCR Not Detected     Bordetella pertussis pcr Not Detected     Bordetella parapertussis PCR Not Detected     Chlamydophila pneumoniae PCR Not Detected     Mycoplasma pneumo by PCR Not Detected    Narrative:      In the setting of a positive respiratory panel with a viral infection PLUS a negative procalcitonin without other underlying concern for bacterial infection, consider observing off antibiotics or discontinuation of antibiotics and continue supportive care. If the respiratory panel is positive for atypical bacterial infection (Bordetella pertussis, Chlamydophila pneumoniae, or Mycoplasma pneumoniae), consider antibiotic de-escalation to target atypical bacterial infection.    Lactic Acid, Plasma [[id number]]  (Normal) Collected: 01/30/25 1650    Specimen: Blood from Arm, Left Updated: 01/30/25 1715     Lactate 1.4 mmol/L     Blood Culture - Blood, Arm, Left [[id number]] Collected: 01/30/25 1650    Specimen: Blood from Arm, Left Updated: 01/30/25 1653    BNP [[id number]]  (Normal) Collected: 01/30/25 1504    Specimen: Blood Updated: 01/30/25 1532     proBNP 122.0 pg/mL     Narrative:      This assay is used as an aid in the diagnosis of individuals suspected of having heart failure. It can be used as an aid in the diagnosis of acute decompensated heart failure (ADHF) in patients presenting with signs and symptoms of ADHF to the emergency department (ED). In addition, NT-proBNP  of <300 pg/mL indicates ADHF is not likely.    Age Range Result Interpretation  NT-proBNP Concentration (pg/mL:      <50             Positive            >450                   Gray                 300-450                    Negative             <300    50-75           Positive            >900                  Gray                300-900                  Negative            <300      >75             Positive            >1800                  Gray                300-1800                  Negative            <300    High Sensitivity Troponin T [[id number]]  (Normal) Collected: 01/30/25 1504    Specimen: Blood Updated: 01/30/25 1532     HS Troponin T <6 ng/L     Narrative:      High Sensitive Troponin T Reference Range:  <14.0 ng/L- Negative Female for AMI  <22.0 ng/L- Negative Male for AMI  >=14 - Abnormal Female indicating possible myocardial injury.  >=22 - Abnormal Male indicating possible myocardial injury.   Clinicians would have to utilize clinical acumen, EKG, Troponin, and serial changes to determine if it is an Acute Myocardial Infarction or myocardial injury due to an underlying chronic condition.         Comprehensive Metabolic Panel [[id number]]  (Abnormal) Collected: 01/30/25 1504    Specimen: Blood Updated: 01/30/25 1530     Glucose 118 mg/dL      BUN 5 mg/dL      Creatinine 0.69 mg/dL      Sodium 134 mmol/L      Potassium 4.7 mmol/L      Comment: Slight hemolysis detected by analyzer. Result may be falsely elevated.        Chloride 96 mmol/L      CO2 34.0 mmol/L      Calcium 9.5 mg/dL      Total Protein 6.6 g/dL      Albumin 3.6 g/dL      ALT (SGPT) 32 U/L      AST (SGOT) 19 U/L      Comment: Slight hemolysis detected by analyzer. Result may be falsely elevated.        Alkaline Phosphatase 126 U/L      Total Bilirubin 0.4 mg/dL      Globulin 3.0 gm/dL      A/G Ratio 1.2 g/dL      BUN/Creatinine Ratio 7.2     Anion Gap 4.0 mmol/L      eGFR 117.0 mL/min/1.73     Narrative:      GFR Categories in Chronic  Kidney Disease (CKD)      GFR Category          GFR (mL/min/1.73)    Interpretation  G1                     90 or greater         Normal or high (1)  G2                      60-89                Mild decrease (1)  G3a                   45-59                Mild to moderate decrease  G3b                   30-44                Moderate to severe decrease  G4                    15-29                Severe decrease  G5                    14 or less           Kidney failure          (1)In the absence of evidence of kidney disease, neither GFR category G1 or G2 fulfill the criteria for CKD.    eGFR calculation 2021 CKD-EPI creatinine equation, which does not include race as a factor    Pregnancy, Urine - Urine, Clean Catch [[id number]]  (Normal) Collected: 01/30/25 1515    Specimen: Urine, Clean Catch Updated: 01/30/25 1521     HCG, Urine QL Negative    Urinalysis without microscopic (no culture) - Urine, Clean Catch [[id number]]  (Abnormal) Collected: 01/30/25 1515    Specimen: Urine, Clean Catch Updated: 01/30/25 1520     Color, UA Yellow     Appearance, UA Slightly Cloudy     pH, UA 7.5     Specific Gravity, UA 1.020     Glucose, UA Negative     Ketones, UA Negative     Bilirubin, UA Negative     Blood, UA Negative     Protein, UA 30 mg/dL (1+)     Leuk Esterase, UA Negative     Nitrite, UA Negative     Urobilinogen, UA >=8.0 E.U./dL    CBC & Differential [[id number]]  (Abnormal) Collected: 01/30/25 1504    Specimen: Blood Updated: 01/30/25 1514    Narrative:      The following orders were created for panel order CBC & Differential.  Procedure                               Abnormality         Status                     ---------                               -----------         ------                     CBC Auto Differential[[id number]]        Abnormal            Final result                 Please view results for these tests on the individual orders.    CBC Auto Differential [[id number]]  (Abnormal) Collected: 01/30/25  1504    Specimen: Blood Updated: 01/30/25 1514     WBC 14.65 10*3/mm3      RBC 4.40 10*6/mm3      Hemoglobin 11.8 g/dL      Hematocrit 39.8 %      MCV 90.5 fL      MCH 26.8 pg      MCHC 29.6 g/dL      RDW 14.2 %      RDW-SD 47.3 fl      MPV 9.3 fL      Platelets 193 10*3/mm3      Neutrophil % 83.9 %      Lymphocyte % 7.2 %      Monocyte % 8.4 %      Eosinophil % 0.1 %      Basophil % 0.1 %      Immature Grans % 0.3 %      Neutrophils, Absolute 12.29 10*3/mm3      Lymphocytes, Absolute 1.06 10*3/mm3      Monocytes, Absolute 1.23 10*3/mm3      Eosinophils, Absolute 0.02 10*3/mm3      Basophils, Absolute 0.01 10*3/mm3      Immature Grans, Absolute 0.04 10*3/mm3     COVID-19 and FLU A/B PCR, 1 HR TAT - Swab, Nasopharynx [[id number]]  (Normal) Collected: 01/30/25 1425    Specimen: Swab from Nasopharynx Updated: 01/30/25 1447     COVID19 Not Detected     Influenza A PCR Not Detected     Influenza B PCR Not Detected    Narrative:      Fact sheet for providers: https://www.fda.gov/media/975668/download    Fact sheet for patients: https://www.fda.gov/media/224944/download    Test performed by PCR.    RSV PCR - Swab, Nasopharynx [[id number]]  (Normal) Collected: 01/30/25 1425    Specimen: Swab from Nasopharynx Updated: 01/30/25 1447     RSV, PCR Not Detected             Imaging Results (Last 24 Hours)       Procedure Component Value Units Date/Time    CT Angiogram Chest Pulmonary Embolism [[id number]] Collected: 01/30/25 1609     Updated: 01/30/25 1614    Narrative:      CT ANGIOGRAM CHEST PULMONARY EMBOLISM    Date of Exam: 1/30/2025 3:50 PM EST    Indication: shortness of breath, hypoxia.    Comparison: CT chest 1/19/2025. AP chest 1/19/2025.    Technique: Axial CT images were obtained of the chest after the uneventful intravenous administration of iodinated contrast utilizing pulmonary embolism protocol.  In addition, a 3-D volume rendered image was created for interpretation.  Sagittal and   coronal reconstructions were  performed.  Automated exposure control and iterative reconstruction methods were used.      Findings:  Suboptimal contrast opacification of the pulmonary arteries. No pulmonary embolism is identified. Heart size is within normal limits. No significant coronary calcifications. No pericardial effusion or pleural effusion.    Patchy peribronchiolar nodular densities are seen within the posterior right upper lobe and within the posterior medial bilateral lower lobes with relative sparing of the right middle lobe and left upper lobe. Linear subsegmental atelectasis is   demonstrated within the left upper lobe and bilateral lower lobes.    No acute or suspicious osseous abnormalities. Bilateral breast implants are noted. The liver is steatotic. Cholecystectomy changes are present. Spleen size is enlarged at 15.7 cm but appears stable. Remainder of the imaged upper abdominal organs have a   normal appearance.          Impression:      Impression:    1. No pulmonary embolism is seen.  2. Patchy peribronchiolar nodular densities within both lungs suggestive of bronchopneumonia  3. Stable splenic enlargement.  4. Hepatic steatosis.        Electronically Signed: Esthela Begum MD    1/30/2025 4:12 PM EST    Workstation ID: [id number]              Assessment & Plan        This is a 34 y.o. female with:    Active and Resolved Problems  Active Hospital Problems    Diagnosis  POA    **Bronchopneumonia [J18.0]  Yes      Resolved Hospital Problems   No resolved problems to display.       Bronchopneumonia, post-viral  Acute hypoxic respiratory failure due to Bronchopneumonia and possible underlying OHS  Patient requiring 3 L NC during ED course  Recent hospitalization for acute hypoxia and Rhinovirus infection  CT chest PE protocol negative for PE, there are patchy peribronchiolar nodular densities within both lungs  Started on IV cefepime and IV vancomycin given recent hospitalization, possible hospital acquired pneumonia, will  continue for now, may stop vanco if MRSA screen negative   Continue oxygen supplementation, titrate FIO2 to goal SPO2 > 90 %, wean as tolerated  Checking blood and sputum culture, atypical workup      Morbid Obesity  Continue lifestyle modifications        History of depression  Continue home medications    VTE Prophylaxis:  Mechanical VTE prophylaxis orders are present.        The patient desires to be as follows:    CODE STATUS:     Full code      Jaymie Donnelly, who can be contacted at , is the designated person to make medical decisions on the patient's behalf if She is incapable of doing so. This was clarified with patient and/or next of kin on 1/30/2025 during the course of this H&P.    Admission Status:  I believe this patient meets inpatient status.    Expected Length of Stay: 2-3    PDMP and Medication Dispenses via Sidebar reviewed and consistent with patient reported medications.    I discussed the patient's findings and my recommendations with patient, family, and nursing staff.      Signature:     This document has been electronically signed by Carlos Llanes Alvarez, MD on January 30, 2025 23:36 [facility] Hospitalist Team

## 2025-01-31 NOTE — CASE MANAGEMENT/SOCIAL WORK
Social Work Assessment   [place]     Patient Name: Jacqueline Turcios  MRN: [id number]  Today's Date: 1/31/2025    Admit Date: 1/30/2025     Discharge Needs Assessment       Row Name 01/31/25 1401       Living Environment    People in Home child(lalit), dependent;other relative(s)  Aunt.    Current Living Arrangements home    Potentially Unsafe Housing Conditions none    In the past 12 months has the electric, gas, oil, or water company threatened to shut off services in your home? No    Primary Care Provided by self    Provides Primary Care For child(lalit)    Family Caregiver if Needed other relative(s)  Aunt.    Quality of Family Relationships involved;helpful;supportive    Able to Return to Prior Arrangements yes       Resource/Environmental Concerns    Resource/Environmental Concerns none    Transportation Concerns none       Transportation Needs    In the past 12 months, has lack of transportation kept you from medical appointments or from getting medications? no    In the past 12 months, has lack of transportation kept you from meetings, work, or from getting things needed for daily living? No       Food Insecurity    Within the past 12 months, you worried that your food would run out before you got the money to buy more. Never true    Within the past 12 months, the food you bought just didn't last and you didn't have money to get more. Never true       Transition Planning    Patient/Family Anticipates Transition to home with family    Patient/Family Anticipated Services at Transition none    Transportation Anticipated family or friend will provide       Discharge Needs Assessment    Readmission Within the Last 30 Days no previous admission in last 30 days    Concerns to be Addressed no discharge needs identified    Do you want help finding or keeping work or a job? I do not need or want help    Do you want help with school or training? For example, starting or completing job training or getting a high school diploma,  GED or equivalent No    Anticipated Changes Related to Illness none    Equipment Needed After Discharge none    Provided Post Acute Provider List? N/A    Provided Post Acute Provider Quality & Resource List? N/A    Offered/Gave Vendor List no              Discharge Plan       Row Name 01/31/25 0218       Plan    Plan Routine home.    Patient/Family in Agreement with Plan yes    Plan Comments LSW met with patient at bedside, introduced self/role, and reason for visit. LSW completed CM assessment with patient. PCP confirmed, pharmacy is [facility] on [address]. Patient is able to drive and provides her own self-care. Patient lives at home with her aunt and two children. Patient's mother is transporting patient home after discharge. Patient has no DME and has no outpatient or home health services.              Demographic Summary       Row Name 01/31/25 1359       General Information    Admission Type observation    Arrived From emergency department    Referral Source admission list    Reason for Consult care coordination/care conference;discharge planning    Preferred Language English       Contact Information    Permission Granted to Share Info With     Contact Information Obtained for               Functional Status       Row Name 01/31/25 1400       Functional Status    Usual Activity Tolerance excellent    Current Activity Tolerance excellent       Physical Activity    On average, how many days per week do you engage in moderate to strenuous exercise (like a brisk walk)? 7 days    On average, how many minutes do you engage in exercise at this level? 60 min    Number of minutes of exercise per week 420       Functional Status, IADL    Medications independent    Meal Preparation independent    Housekeeping independent    Laundry independent    Shopping independent    If for any reason you need help with day-to-day activities such as bathing, preparing meals, shopping, managing finances,  etc., do you get the help you need? I don't need any help             GERRY Narayanan, LSW    Phone: [phone]  Fax: [phone]  [email or web address]

## 2025-01-31 NOTE — PLAN OF CARE
Goal Outcome Evaluation:              Outcome Evaluation: Patient has been resting well throughout the shift. Only complaints were of a headache due to not getting her methadone; methadone has been ordered. Remains on IV cefepime and on 3L of oxygen. No other issues at this time. Continuing to monitor.

## 2025-01-31 NOTE — PLAN OF CARE
Goal Outcome Evaluation:      VSS on 3L NC, up ad raz, non productive cough, iv atb, droplet iso for previous visit + rhino

## 2025-01-31 NOTE — ED NOTES
Pt counseled on importance of EMS transfer d/t oxygen dependence. Pt verbalizes understanding and refuses transport via EMS. Pt agreed to sign EMS refusal form, RN witnessed. Pt educated to go directly to [facility] upon exit of this facility. Pt states she will be stopping at home first for personal items. Pt educated again to go directly to [facility] and have her family member return home for personal items. Pt states she will do this, verbalizes understanding.

## 2025-02-01 LAB
ALBUMIN SERPL-MCNC: 3.5 G/DL (ref 3.5–5.2)
ALBUMIN/GLOB SERPL: 1.1 G/DL
ALP SERPL-CCNC: 113 U/L (ref 39–117)
ALT SERPL W P-5'-P-CCNC: 24 U/L (ref 1–33)
ANION GAP SERPL CALCULATED.3IONS-SCNC: 8.6 MMOL/L (ref 5–15)
AST SERPL-CCNC: 15 U/L (ref 1–32)
BASOPHILS # BLD AUTO: 0.03 10*3/MM3 (ref 0–0.2)
BASOPHILS NFR BLD AUTO: 0.2 % (ref 0–1.5)
BILIRUB SERPL-MCNC: <0.2 MG/DL (ref 0–1.2)
BUN SERPL-MCNC: 6 MG/DL (ref 6–20)
BUN/CREAT SERPL: 10 (ref 7–25)
CALCIUM SPEC-SCNC: 9.3 MG/DL (ref 8.6–10.5)
CHLORIDE SERPL-SCNC: 102 MMOL/L (ref 98–107)
CO2 SERPL-SCNC: 27.4 MMOL/L (ref 22–29)
CREAT SERPL-MCNC: 0.6 MG/DL (ref 0.57–1)
DEPRECATED RDW RBC AUTO: 45.4 FL (ref 37–54)
EGFRCR SERPLBLD CKD-EPI 2021: 121 ML/MIN/1.73
EOSINOPHIL # BLD AUTO: 0 10*3/MM3 (ref 0–0.4)
EOSINOPHIL NFR BLD AUTO: 0 % (ref 0.3–6.2)
ERYTHROCYTE [DISTWIDTH] IN BLOOD BY AUTOMATED COUNT: 14.1 % (ref 12.3–15.4)
GLOBULIN UR ELPH-MCNC: 3.3 GM/DL
GLUCOSE SERPL-MCNC: 211 MG/DL (ref 65–99)
HCT VFR BLD AUTO: 36.9 % (ref 34–46.6)
HGB BLD-MCNC: 10.9 G/DL (ref 12–15.9)
IMM GRANULOCYTES # BLD AUTO: 0.22 10*3/MM3 (ref 0–0.05)
IMM GRANULOCYTES NFR BLD AUTO: 1.7 % (ref 0–0.5)
LYMPHOCYTES # BLD AUTO: 1.1 10*3/MM3 (ref 0.7–3.1)
LYMPHOCYTES NFR BLD AUTO: 8.4 % (ref 19.6–45.3)
MCH RBC QN AUTO: 26.6 PG (ref 26.6–33)
MCHC RBC AUTO-ENTMCNC: 29.5 G/DL (ref 31.5–35.7)
MCV RBC AUTO: 90 FL (ref 79–97)
MONOCYTES # BLD AUTO: 0.94 10*3/MM3 (ref 0.1–0.9)
MONOCYTES NFR BLD AUTO: 7.2 % (ref 5–12)
NEUTROPHILS NFR BLD AUTO: 10.84 10*3/MM3 (ref 1.7–7)
NEUTROPHILS NFR BLD AUTO: 82.5 % (ref 42.7–76)
NRBC BLD AUTO-RTO: 0 /100 WBC (ref 0–0.2)
PLATELET # BLD AUTO: 217 10*3/MM3 (ref 140–450)
PMV BLD AUTO: 10 FL (ref 6–12)
POTASSIUM SERPL-SCNC: 4.4 MMOL/L (ref 3.5–5.2)
PROT SERPL-MCNC: 6.8 G/DL (ref 6–8.5)
RBC # BLD AUTO: 4.1 10*6/MM3 (ref 3.77–5.28)
SODIUM SERPL-SCNC: 138 MMOL/L (ref 136–145)
WBC NRBC COR # BLD AUTO: 13.13 10*3/MM3 (ref 3.4–10.8)

## 2025-02-01 PROCEDURE — 94799 UNLISTED PULMONARY SVC/PX: CPT

## 2025-02-01 PROCEDURE — 25010000002 CEFEPIME PER 500 MG: Performed by: STUDENT IN AN ORGANIZED HEALTH CARE EDUCATION/TRAINING PROGRAM

## 2025-02-01 PROCEDURE — 80053 COMPREHEN METABOLIC PANEL: CPT | Performed by: INTERNAL MEDICINE

## 2025-02-01 PROCEDURE — 25010000002 METHYLPREDNISOLONE PER 40 MG: Performed by: INTERNAL MEDICINE

## 2025-02-01 PROCEDURE — 85025 COMPLETE CBC W/AUTO DIFF WBC: CPT | Performed by: INTERNAL MEDICINE

## 2025-02-01 PROCEDURE — 94761 N-INVAS EAR/PLS OXIMETRY MLT: CPT

## 2025-02-01 PROCEDURE — 94664 DEMO&/EVAL PT USE INHALER: CPT

## 2025-02-01 RX ADMIN — GABAPENTIN 100 MG: 100 CAPSULE ORAL at 16:27

## 2025-02-01 RX ADMIN — IPRATROPIUM BROMIDE AND ALBUTEROL SULFATE 3 ML: .5; 3 SOLUTION RESPIRATORY (INHALATION) at 11:12

## 2025-02-01 RX ADMIN — METHYLPREDNISOLONE SODIUM SUCCINATE 40 MG: 40 INJECTION, POWDER, FOR SOLUTION INTRAMUSCULAR; INTRAVENOUS at 21:04

## 2025-02-01 RX ADMIN — IPRATROPIUM BROMIDE AND ALBUTEROL SULFATE 3 ML: .5; 3 SOLUTION RESPIRATORY (INHALATION) at 06:40

## 2025-02-01 RX ADMIN — CEFEPIME 2000 MG: 2 INJECTION, POWDER, FOR SOLUTION INTRAVENOUS at 11:10

## 2025-02-01 RX ADMIN — IPRATROPIUM BROMIDE AND ALBUTEROL SULFATE 3 ML: .5; 3 SOLUTION RESPIRATORY (INHALATION) at 00:31

## 2025-02-01 RX ADMIN — MONTELUKAST 10 MG: 10 TABLET, FILM COATED ORAL at 21:05

## 2025-02-01 RX ADMIN — PANTOPRAZOLE SODIUM 40 MG: 40 TABLET, DELAYED RELEASE ORAL at 09:37

## 2025-02-01 RX ADMIN — NICOTINE 1 PATCH: 14 PATCH TRANSDERMAL at 09:34

## 2025-02-01 RX ADMIN — BREXPIPRAZOLE 2 MG: 1 TABLET ORAL at 09:30

## 2025-02-01 RX ADMIN — GABAPENTIN 100 MG: 100 CAPSULE ORAL at 21:05

## 2025-02-01 RX ADMIN — METHADONE HYDROCHLORIDE 177 MG: 10 CONCENTRATE ORAL at 09:31

## 2025-02-01 RX ADMIN — METHYLPREDNISOLONE SODIUM SUCCINATE 40 MG: 40 INJECTION, POWDER, FOR SOLUTION INTRAMUSCULAR; INTRAVENOUS at 15:20

## 2025-02-01 RX ADMIN — METHOCARBAMOL TABLETS 750 MG: 750 TABLET, COATED ORAL at 21:05

## 2025-02-01 RX ADMIN — LAMOTRIGINE 25 MG: 25 TABLET ORAL at 21:05

## 2025-02-01 RX ADMIN — IPRATROPIUM BROMIDE AND ALBUTEROL SULFATE 3 ML: .5; 3 SOLUTION RESPIRATORY (INHALATION) at 19:09

## 2025-02-01 RX ADMIN — METHOCARBAMOL TABLETS 750 MG: 750 TABLET, COATED ORAL at 09:30

## 2025-02-01 RX ADMIN — METHOCARBAMOL TABLETS 750 MG: 750 TABLET, COATED ORAL at 16:27

## 2025-02-01 RX ADMIN — FLUOXETINE 40 MG: 20 CAPSULE ORAL at 09:30

## 2025-02-01 RX ADMIN — METHYLPREDNISOLONE SODIUM SUCCINATE 40 MG: 40 INJECTION, POWDER, FOR SOLUTION INTRAMUSCULAR; INTRAVENOUS at 05:26

## 2025-02-01 RX ADMIN — Medication 10 ML: at 21:05

## 2025-02-01 RX ADMIN — CEFEPIME 2000 MG: 2 INJECTION, POWDER, FOR SOLUTION INTRAVENOUS at 18:59

## 2025-02-01 RX ADMIN — GABAPENTIN 100 MG: 100 CAPSULE ORAL at 09:30

## 2025-02-01 RX ADMIN — IPRATROPIUM BROMIDE AND ALBUTEROL SULFATE 3 ML: .5; 3 SOLUTION RESPIRATORY (INHALATION) at 23:45

## 2025-02-01 RX ADMIN — CEFEPIME 2000 MG: 2 INJECTION, POWDER, FOR SOLUTION INTRAVENOUS at 01:10

## 2025-02-01 RX ADMIN — LAMOTRIGINE 25 MG: 25 TABLET ORAL at 09:30

## 2025-02-01 RX ADMIN — Medication 10 ML: at 09:31

## 2025-02-01 NOTE — PROGRESS NOTES
[facility] MEDICINE SERVICE  DAILY PROGRESS NOTE    NAME: Jacqueline Turcios  : 1990  MRN: [id number]      LOS: 0 days     PROVIDER OF SERVICE: Marlene Etienne MD    Chief Complaint: Bronchopneumonia    Subjective:     Interval History:    Patient seen and evaluated at bedside.   On 1l/nc, o2 sats =92%  Treatment plan discussed with patient. All questions addressed.     Review of Systems:   All 21 ROS were negative except mentioned above.    Objective:     Vital Signs  Temp:  [98 °F (36.7 °C)-99.5 °F (37.5 °C)] 98.3 °F (36.8 °C)  Heart Rate:  [] 100  Resp:  [14-20] 19  BP: (110-150)/(65-86) 111/71  Flow (L/min) (Oxygen Therapy):  [2-3] 2   Body mass index is 51.69 kg/m².    Physical Exam   General: No acute distress, appears stated age  Neuro: Awake and alert, oriented x3, no focal deficits appreciated  Head: Atraumatic, normocephalic  HEENT: EOMI, anicteric, normal sclerae and conjunctivae, moist mucus membranes  Neck: supple, no lymphadenopathy  CV: RRR, soft heart sounds, no murmurs appreciated, no peripheral edema  Pulm: Decreased breath sounds,  increased work of breathing, no adventitious sounds  Abd: Soft, nontender, nondistended  Skin: Warm, dry and intact  Psych: Appropriate mood and affect    Scheduled Meds   Brexpiprazole, 2 mg, Oral, Daily  cefepime, 2,000 mg, Intravenous, Q8H  FLUoxetine, 40 mg, Oral, Daily  gabapentin, 100 mg, Oral, TID  ipratropium-albuterol, 3 mL, Nebulization, Q6H - RT  lamoTRIgine, 25 mg, Oral, BID  methadone, 177 mg, Oral, Daily  methocarbamol, 750 mg, Oral, TID  methylPREDNISolone sodium succinate, 40 mg, Intravenous, Q8H  montelukast, 10 mg, Oral, Nightly  nicotine, 1 patch, Transdermal, Q24H  pantoprazole, 40 mg, Oral, QAM AC  sodium chloride, 10 mL, Intravenous, Q12H  [START ON 2025] vitamin D, 50,000 Units, Oral, Weekly       PRN Meds     acetaminophen **OR** acetaminophen **OR** acetaminophen    senna-docusate sodium **AND** polyethylene glycol **AND**  bisacodyl **AND** bisacodyl    Calcium Replacement - Follow Nurse / BPA Driven Protocol    guaiFENesin-dextromethorphan    hydrOXYzine    Magnesium Standard Dose Replacement - Follow Nurse / BPA Driven Protocol    nitroglycerin    Pharmacy to Dose Cefepime    Phosphorus Replacement - Follow Nurse / BPA Driven Protocol    Potassium Replacement - Follow Nurse / BPA Driven Protocol    sodium chloride    sodium chloride    sodium chloride   Infusions  Pharmacy to Dose Cefepime,           Diagnostic Data    Results from last 7 days   Lab Units 02/01/25  0457   WBC 10*3/mm3 13.13*   HEMOGLOBIN g/dL 10.9*   HEMATOCRIT % 36.9   PLATELETS 10*3/mm3 217   GLUCOSE mg/dL 211*   CREATININE mg/dL 0.60   BUN mg/dL 6   SODIUM mmol/L 138   POTASSIUM mmol/L 4.4   AST (SGOT) U/L 15   ALT (SGPT) U/L 24   ALK PHOS U/L 113   BILIRUBIN mg/dL <0.2   ANION GAP mmol/L 8.6       CT Angiogram Chest Pulmonary Embolism    Result Date: 1/30/2025  Impression: 1. No pulmonary embolism is seen. 2. Patchy peribronchiolar nodular densities within both lungs suggestive of bronchopneumonia 3. Stable splenic enlargement. 4. Hepatic steatosis. Electronically Signed: Esthela Begum MD  1/30/2025 4:12 PM EST  Workstation ID: [id number]     Interval results reviewed.    Assessment/Plan:   Acute hypoxic respiratory failure  Bilateral pneumonia-healthcare associated pneumonia  Asthma  Morbid obesity  Smoker  Hepatic steatosis  Splenomegaly     Continue patient on cefepime, urine Legionella antigen negative and patient on methadone increased risk of QT interval prolongation.    MRSA DNA probe negative, blood culture = no gr  Cont Steroids  Continue oxygen on 3 L per nasal cannula.  Baseline is room air  Patient received cefepime and vancomycin in the ER.  Patient with recent hospitalization  CT angiogram negative for PE     on methadone        Treatment plan discussed with RN.     VTE Prophylaxis:  Mechanical VTE prophylaxis orders are present.         Code status  is   There are no questions and answers to display.       Plan for disposition:     Barriers to Discharge: Hypoxia  Anticipated Date of Discharge: 2/2/25  Place of Discharge:  home         Time: 40 minutes     Signature: Electronically signed by Marlene Etienne MD, 02/01/25, 10:30 EST.  [facility] Hospitalist Team

## 2025-02-01 NOTE — PLAN OF CARE
Goal Outcome Evaluation:   Pt coughing frequently during the night.  Unable able to obtain labs and lab notified of above.  Will continue current plan of care.

## 2025-02-02 ENCOUNTER — READMISSION MANAGEMENT (OUTPATIENT)
Dept: CALL CENTER | Facility: HOSPITAL | Age: 35
End: 2025-02-02
Payer: MEDICAID

## 2025-02-02 VITALS
SYSTOLIC BLOOD PRESSURE: 145 MMHG | RESPIRATION RATE: 14 BRPM | HEART RATE: 85 BPM | TEMPERATURE: 98.7 F | BODY MASS INDEX: 45.99 KG/M2 | HEIGHT: 67 IN | DIASTOLIC BLOOD PRESSURE: 84 MMHG | OXYGEN SATURATION: 92 % | WEIGHT: 293 LBS

## 2025-02-02 LAB
ALBUMIN SERPL-MCNC: 3.6 G/DL (ref 3.5–5.2)
ALBUMIN/GLOB SERPL: 1.1 G/DL
ALP SERPL-CCNC: 109 U/L (ref 39–117)
ALT SERPL W P-5'-P-CCNC: 24 U/L (ref 1–33)
ANION GAP SERPL CALCULATED.3IONS-SCNC: 9.4 MMOL/L (ref 5–15)
AST SERPL-CCNC: 12 U/L (ref 1–32)
BASOPHILS # BLD AUTO: 0.02 10*3/MM3 (ref 0–0.2)
BASOPHILS NFR BLD AUTO: 0.1 % (ref 0–1.5)
BILIRUB SERPL-MCNC: <0.2 MG/DL (ref 0–1.2)
BUN SERPL-MCNC: 9 MG/DL (ref 6–20)
BUN/CREAT SERPL: 13.6 (ref 7–25)
CALCIUM SPEC-SCNC: 9 MG/DL (ref 8.6–10.5)
CHLORIDE SERPL-SCNC: 101 MMOL/L (ref 98–107)
CO2 SERPL-SCNC: 25.6 MMOL/L (ref 22–29)
CREAT SERPL-MCNC: 0.66 MG/DL (ref 0.57–1)
DEPRECATED RDW RBC AUTO: 46.7 FL (ref 37–54)
EGFRCR SERPLBLD CKD-EPI 2021: 118.2 ML/MIN/1.73
EOSINOPHIL # BLD AUTO: 0 10*3/MM3 (ref 0–0.4)
EOSINOPHIL NFR BLD AUTO: 0 % (ref 0.3–6.2)
ERYTHROCYTE [DISTWIDTH] IN BLOOD BY AUTOMATED COUNT: 14.5 % (ref 12.3–15.4)
GLOBULIN UR ELPH-MCNC: 3.3 GM/DL
GLUCOSE BLDC GLUCOMTR-MCNC: 139 MG/DL (ref 70–105)
GLUCOSE SERPL-MCNC: 246 MG/DL (ref 65–99)
HCT VFR BLD AUTO: 36 % (ref 34–46.6)
HGB BLD-MCNC: 10.6 G/DL (ref 12–15.9)
IMM GRANULOCYTES # BLD AUTO: 0.26 10*3/MM3 (ref 0–0.05)
IMM GRANULOCYTES NFR BLD AUTO: 1.7 % (ref 0–0.5)
LYMPHOCYTES # BLD AUTO: 1.32 10*3/MM3 (ref 0.7–3.1)
LYMPHOCYTES NFR BLD AUTO: 8.9 % (ref 19.6–45.3)
MCH RBC QN AUTO: 26.4 PG (ref 26.6–33)
MCHC RBC AUTO-ENTMCNC: 29.4 G/DL (ref 31.5–35.7)
MCV RBC AUTO: 89.8 FL (ref 79–97)
MONOCYTES # BLD AUTO: 0.87 10*3/MM3 (ref 0.1–0.9)
MONOCYTES NFR BLD AUTO: 5.9 % (ref 5–12)
NEUTROPHILS NFR BLD AUTO: 12.39 10*3/MM3 (ref 1.7–7)
NEUTROPHILS NFR BLD AUTO: 83.4 % (ref 42.7–76)
NRBC BLD AUTO-RTO: 0 /100 WBC (ref 0–0.2)
PLATELET # BLD AUTO: 235 10*3/MM3 (ref 140–450)
PMV BLD AUTO: 9.9 FL (ref 6–12)
POTASSIUM SERPL-SCNC: 4.5 MMOL/L (ref 3.5–5.2)
PROT SERPL-MCNC: 6.9 G/DL (ref 6–8.5)
QT INTERVAL: 351 MS
QTC INTERVAL: 444 MS
RBC # BLD AUTO: 4.01 10*6/MM3 (ref 3.77–5.28)
SODIUM SERPL-SCNC: 136 MMOL/L (ref 136–145)
WBC NRBC COR # BLD AUTO: 14.86 10*3/MM3 (ref 3.4–10.8)

## 2025-02-02 PROCEDURE — 80053 COMPREHEN METABOLIC PANEL: CPT | Performed by: INTERNAL MEDICINE

## 2025-02-02 PROCEDURE — 94664 DEMO&/EVAL PT USE INHALER: CPT

## 2025-02-02 PROCEDURE — 85025 COMPLETE CBC W/AUTO DIFF WBC: CPT | Performed by: INTERNAL MEDICINE

## 2025-02-02 PROCEDURE — 25010000002 CEFEPIME PER 500 MG: Performed by: STUDENT IN AN ORGANIZED HEALTH CARE EDUCATION/TRAINING PROGRAM

## 2025-02-02 PROCEDURE — 25010000002 METHYLPREDNISOLONE PER 40 MG: Performed by: INTERNAL MEDICINE

## 2025-02-02 PROCEDURE — 94618 PULMONARY STRESS TESTING: CPT

## 2025-02-02 PROCEDURE — 94799 UNLISTED PULMONARY SVC/PX: CPT

## 2025-02-02 PROCEDURE — 82948 REAGENT STRIP/BLOOD GLUCOSE: CPT | Performed by: INTERNAL MEDICINE

## 2025-02-02 PROCEDURE — 94761 N-INVAS EAR/PLS OXIMETRY MLT: CPT

## 2025-02-02 RX ORDER — NICOTINE POLACRILEX 4 MG
15 LOZENGE BUCCAL
Status: DISCONTINUED | OUTPATIENT
Start: 2025-02-02 | End: 2025-02-02 | Stop reason: HOSPADM

## 2025-02-02 RX ORDER — INSULIN LISPRO 100 [IU]/ML
2-9 INJECTION, SOLUTION INTRAVENOUS; SUBCUTANEOUS
Status: DISCONTINUED | OUTPATIENT
Start: 2025-02-02 | End: 2025-02-02 | Stop reason: HOSPADM

## 2025-02-02 RX ORDER — PREDNISONE 10 MG/1
TABLET ORAL
Qty: 48 TABLET | Refills: 0 | Status: SHIPPED | OUTPATIENT
Start: 2025-02-02

## 2025-02-02 RX ORDER — PANTOPRAZOLE SODIUM 40 MG/1
40 TABLET, DELAYED RELEASE ORAL
Status: DISCONTINUED | OUTPATIENT
Start: 2025-02-02 | End: 2025-02-02 | Stop reason: HOSPADM

## 2025-02-02 RX ORDER — IBUPROFEN 600 MG/1
1 TABLET ORAL
Status: DISCONTINUED | OUTPATIENT
Start: 2025-02-02 | End: 2025-02-02 | Stop reason: HOSPADM

## 2025-02-02 RX ORDER — ALBUTEROL SULFATE 90 UG/1
2 INHALANT RESPIRATORY (INHALATION) EVERY 4 HOURS PRN
Qty: 6.7 G | Refills: 0 | Status: SHIPPED | OUTPATIENT
Start: 2025-02-02

## 2025-02-02 RX ORDER — DEXTROSE MONOHYDRATE 25 G/50ML
25 INJECTION, SOLUTION INTRAVENOUS
Status: DISCONTINUED | OUTPATIENT
Start: 2025-02-02 | End: 2025-02-02 | Stop reason: HOSPADM

## 2025-02-02 RX ORDER — CEFDINIR 300 MG/1
300 CAPSULE ORAL 2 TIMES DAILY
Qty: 10 CAPSULE | Refills: 0 | Status: SHIPPED | OUTPATIENT
Start: 2025-02-02 | End: 2025-02-05 | Stop reason: SDUPTHER

## 2025-02-02 RX ADMIN — CEFEPIME 2000 MG: 2 INJECTION, POWDER, FOR SOLUTION INTRAVENOUS at 11:18

## 2025-02-02 RX ADMIN — Medication 10 ML: at 09:48

## 2025-02-02 RX ADMIN — METHADONE HYDROCHLORIDE 177 MG: 10 CONCENTRATE ORAL at 09:48

## 2025-02-02 RX ADMIN — PANTOPRAZOLE SODIUM 40 MG: 40 TABLET, DELAYED RELEASE ORAL at 09:47

## 2025-02-02 RX ADMIN — FLUOXETINE 40 MG: 20 CAPSULE ORAL at 09:47

## 2025-02-02 RX ADMIN — IPRATROPIUM BROMIDE AND ALBUTEROL SULFATE 3 ML: .5; 3 SOLUTION RESPIRATORY (INHALATION) at 07:34

## 2025-02-02 RX ADMIN — CEFEPIME 2000 MG: 2 INJECTION, POWDER, FOR SOLUTION INTRAVENOUS at 02:46

## 2025-02-02 RX ADMIN — NICOTINE 1 PATCH: 14 PATCH TRANSDERMAL at 09:53

## 2025-02-02 RX ADMIN — METHOCARBAMOL TABLETS 750 MG: 750 TABLET, COATED ORAL at 09:48

## 2025-02-02 RX ADMIN — METHYLPREDNISOLONE SODIUM SUCCINATE 40 MG: 40 INJECTION, POWDER, FOR SOLUTION INTRAMUSCULAR; INTRAVENOUS at 15:22

## 2025-02-02 RX ADMIN — GABAPENTIN 100 MG: 100 CAPSULE ORAL at 09:48

## 2025-02-02 RX ADMIN — METHYLPREDNISOLONE SODIUM SUCCINATE 40 MG: 40 INJECTION, POWDER, FOR SOLUTION INTRAMUSCULAR; INTRAVENOUS at 05:27

## 2025-02-02 RX ADMIN — BREXPIPRAZOLE 2 MG: 1 TABLET ORAL at 09:48

## 2025-02-02 RX ADMIN — LAMOTRIGINE 25 MG: 25 TABLET ORAL at 09:48

## 2025-02-02 NOTE — PROCEDURES
Exercise Oximetry    Patient Name:Jacqueline Turcios   MRN: [id number]   Date: 02/02/25             ROOM AIR BASELINE   SpO2% 94   Heart Rate 85   Blood Pressure      EXERCISE ON ROOM AIR SpO2% EXERCISE ON O2 @  LPM SpO2%   1 MINUTE 95 1 MINUTE    2 MINUTES 95 2 MINUTES    3 MINUTES 94 3 MINUTES    4 MINUTES 94 4 MINUTES    5 MINUTES 93 5 MINUTES    6 MINUTES 95 6 MINUTES               Distance Walked   Distance Walked   Dyspnea (Jojo Scale)   Dyspnea (Jojo Scale)   Fatigue (Jojo Scale)   Fatigue (Jojo Scale)   SpO2% Post Exercise   SpO2% Post Exercise   HR Post Exercise   HR Post Exercise   Time to Recovery   Time to Recovery     Comments: Patient does not require home O2 at this time   Romana Rocha, CRT

## 2025-02-02 NOTE — PLAN OF CARE
Goal Outcome Evaluation:   Pt napping between care.  Pt denies any pain or discomfort and no s/s of pain noted.  Will continue current plan of care.

## 2025-02-02 NOTE — PLAN OF CARE
Goal Outcome Evaluation:              Outcome Evaluation: PT discharging per MD

## 2025-02-02 NOTE — PLAN OF CARE
Goal Outcome Evaluation:              Outcome Evaluation: Pt has been resting comfortably throughout the shift. She remainson 3L of O2 and desats when increasing activity. She is stable with no complaints at this time.

## 2025-02-02 NOTE — DISCHARGE SUMMARY
"             [facility] Medicine Services  Discharge Summary    Date of Service: 2025  Patient Name: Jacqueline Turcios  : 1990  MRN: [id number]    Date of Admission: 2025  Discharge Diagnosis: Bronchopneumonia  Date of Discharge: 2025  Primary Care Physician: Ellie Elizabeth APRN    Presenting Problem:   Bronchopneumonia [J18.0]  Hypoxia [R09.02]    Active and Resolved Hospital Problems:  Active Hospital Problems    Diagnosis POA    **Bronchopneumonia [J18.0] Yes      Resolved Hospital Problems   No resolved problems to display.         Hospital Course     HPI:  \"Jacqueline Turcios is a 34 y.o. female with a PMH of PMH of MDD, Morbid Obesity, ongoing smoking around 8 years PPD, Asthma, well known to me from recent admission on  for acute hypoxia, rhinovirus infection  who presented to [facility] on 2025 with  worsening SOB since she woke up today in am, associated with greenish sputum production, subjective fever and chills. Was seen at [facility] ED and was hypoxic SPO2 82 % on room air, requiring 3 L NC. CT chest PE protocol performed showed patchy peribronchiolar nodular densities within both lungs suggestive of bronchopneumonia. The patient was subsequently transferred to [facility] for further evaluation and management. \"    Hospital Course:  Patient was admitted to the for bronchopneumonia she received cefepime and vancomycin.  Patient was started on 3 L oxygen per nasal cannula O2 sats were 95%.  Azithromycin was discontinued as urine Legionella antigen was negative and patient was on methadone so risk of increased QT interval.  Her MRSA DNA probe was negative so vancomycin was also discontinued and patient was continued on cefepime.  Her blood cultures were no growth.  Her CT angiogram was negative for PE.  During hospitalization her oxygen demand decreases and 6-minute walk test was performed before discharge and she passed the test and does not need any home " oxygen.  Her IV steroids were changed to p.o. tapering dose of prednisone.  Patient will need to see if PCP in 2 to 3 days.  Patient stable for discharge    DISCHARGE Follow Up Recommendations for labs and diagnostics:   Follow-up with PCP in 2 to 3 days    Reasons For Change In Medications and Indications for New Medications:  Cefdinir 100 mg 1 tablet twice daily for 5 days.    Day of Discharge     Vital Signs:  Temp:  [97.9 °F (36.6 °C)-98.9 °F (37.2 °C)] 98.7 °F (37.1 °C)  Heart Rate:  [] 85  Resp:  [11-22] 14  BP: (102-145)/(62-84) 145/84  Flow (L/min) (Oxygen Therapy):  [2] 2    Physical Exam:  Vitals and nursing note reviewed.   Constitutional:       Appearance: Normal appearance.   HENT:      Head: Normocephalic and atraumatic.   Cardiovascular:      Rate and Rhythm: Normal rate and rhythm.      Heart sounds: Normal heart sounds.   Pulmonary:      Effort: Pulmonary effort is normal.      Breath sounds: Decreased breath sounds.   Abdominal:      Palpations: Abdomen is soft.   Musculoskeletal:      Cervical back: Neck supple.   Neurological:      Mental Status: Mental status is at baseline.     Pertinent  and/or Most Recent Results     LAB RESULTS:      Lab 02/02/25  0350 02/01/25 0457 01/31/25  0142 01/30/25  1650 01/30/25  1504   WBC 14.86* 13.13* 12.07*  --  14.65*   HEMOGLOBIN 10.6* 10.9* 10.6*  --  11.8*   HEMATOCRIT 36.0 36.9 36.4  --  39.8   PLATELETS 235 217 193  --  193   NEUTROS ABS 12.39* 10.84* 9.33*  --  12.29*   IMMATURE GRANS (ABS) 0.26* 0.22* 0.05  --  0.04   LYMPHS ABS 1.32 1.10 1.65  --  1.06   MONOS ABS 0.87 0.94* 0.98*  --  1.23*   EOS ABS 0.00 0.00 0.04  --  0.02   MCV 89.8 90.0 91.0  --  90.5   LACTATE  --   --   --  1.4  --          Lab 02/02/25  0350 02/01/25  0457 01/31/25  0142 01/30/25  1504   SODIUM 136 138 135* 134*   POTASSIUM 4.5 4.4 3.9 4.7   CHLORIDE 101 102 98 96*   CO2 25.6 27.4 28.2 34.0*   ANION GAP 9.4 8.6 8.8 4.0*   BUN 9 6 7 5*   CREATININE 0.66 0.60 0.64 0.69    EGFR 118.2 121.0 119.1 117.0   GLUCOSE 246* 211* 135* 118*   CALCIUM 9.0 9.3 9.0 9.5         Lab 02/02/25  0350 02/01/25  0457 01/31/25  0142 01/30/25  1504   TOTAL PROTEIN 6.9 6.8 6.3 6.6   ALBUMIN 3.6 3.5 3.3* 3.6   GLOBULIN 3.3 3.3 3.0 3.0   ALT (SGPT) 24 24 30 32   AST (SGOT) 12 15 19 19   BILIRUBIN <0.2 <0.2 0.3 0.4   ALK PHOS 109 113 114 126*         Lab 01/30/25  1504   PROBNP 122.0   HSTROP T <6                 Brief Urine Lab Results  (Last result in the past 365 days)        Color   Clarity   Blood   Leuk Est   Nitrite   Protein   CREAT   Urine HCG        01/30/25 1515               Negative       01/30/25 1515 Yellow   Slightly Cloudy   Negative   Negative   Negative   30 mg/dL (1+)                 Microbiology Results (last 10 days)       Procedure Component Value - Date/Time    Legionella Antigen, Urine - Urine, Urine, Clean Catch [[id number]]  (Normal) Collected: 01/31/25 0147    Lab Status: Final result Specimen: Urine, Clean Catch Updated: 01/31/25 0454     LEGIONELLA ANTIGEN, URINE Negative    S. Pneumo Ag Urine or CSF - Urine, Urine, Clean Catch [[id number]]  (Normal) Collected: 01/31/25 0147    Lab Status: Final result Specimen: Urine, Clean Catch Updated: 01/31/25 0454     Strep Pneumo Ag Negative    MRSA Screen, PCR (Inpatient) - Swab, Nares [[id number]]  (Normal) Collected: 01/30/25 2229    Lab Status: Final result Specimen: Swab from Nares Updated: 01/31/25 0036     MRSA PCR No MRSA Detected    Narrative:      The negative predictive value of this diagnostic test is high and should only be used to consider de-escalating anti-MRSA therapy. A positive result may indicate colonization with MRSA and must be correlated clinically.    Respiratory Panel PCR w/COVID-19(SARS-CoV-2) LOUIE/GIOVANNY/TOMMY/PAD/COR/JANET In-House, NP Swab in UTM/VTM, 2 HR TAT - Swab, Nasopharynx [[id number]]  (Normal) Collected: 01/30/25 2228    Lab Status: Final result Specimen: Swab from Nasopharynx Updated: 01/30/25 9625      ADENOVIRUS, PCR Not Detected     Coronavirus 229E Not Detected     Coronavirus HKU1 Not Detected     Coronavirus NL63 Not Detected     Coronavirus OC43 Not Detected     COVID19 Not Detected     Human Metapneumovirus Not Detected     Human Rhinovirus/Enterovirus Not Detected     Influenza A PCR Not Detected     Influenza B PCR Not Detected     Parainfluenza Virus 1 Not Detected     Parainfluenza Virus 2 Not Detected     Parainfluenza Virus 3 Not Detected     Parainfluenza Virus 4 Not Detected     RSV, PCR Not Detected     Bordetella pertussis pcr Not Detected     Bordetella parapertussis PCR Not Detected     Chlamydophila pneumoniae PCR Not Detected     Mycoplasma pneumo by PCR Not Detected    Narrative:      In the setting of a positive respiratory panel with a viral infection PLUS a negative procalcitonin without other underlying concern for bacterial infection, consider observing off antibiotics or discontinuation of antibiotics and continue supportive care. If the respiratory panel is positive for atypical bacterial infection (Bordetella pertussis, Chlamydophila pneumoniae, or Mycoplasma pneumoniae), consider antibiotic de-escalation to target atypical bacterial infection.    Blood Culture - Blood, Arm, Left [[id number]]  (Normal) Collected: 01/30/25 1650    Lab Status: Preliminary result Specimen: Blood from Arm, Left Updated: 02/01/25 1700     Blood Culture No growth at 2 days    Narrative:      Less than seven (7) mL's of blood was collected.  Insufficient quantity may yield false negative results.    Respiratory Panel PCR w/COVID-19(SARS-CoV-2) LOUIE/GIOVANNY/TOMMY/PAD/COR/JANET In-House, NP Swab in UTM/VTM, 2 HR TAT - Swab, Nasopharynx [[id number]]  (Normal) Collected: 01/30/25 1646    Lab Status: Final result Specimen: Swab from Nasopharynx Updated: 01/30/25 2216     ADENOVIRUS, PCR Not Detected     Coronavirus 229E Not Detected     Coronavirus HKU1 Not Detected     Coronavirus NL63 Not Detected     Coronavirus OC43 Not  Detected     COVID19 Not Detected     Human Metapneumovirus Not Detected     Human Rhinovirus/Enterovirus Not Detected     Influenza A PCR Not Detected     Influenza B PCR Not Detected     Parainfluenza Virus 1 Not Detected     Parainfluenza Virus 2 Not Detected     Parainfluenza Virus 3 Not Detected     Parainfluenza Virus 4 Not Detected     RSV, PCR Not Detected     Bordetella pertussis pcr Not Detected     Bordetella parapertussis PCR Not Detected     Chlamydophila pneumoniae PCR Not Detected     Mycoplasma pneumo by PCR Not Detected    Narrative:      In the setting of a positive respiratory panel with a viral infection PLUS a negative procalcitonin without other underlying concern for bacterial infection, consider observing off antibiotics or discontinuation of antibiotics and continue supportive care. If the respiratory panel is positive for atypical bacterial infection (Bordetella pertussis, Chlamydophila pneumoniae, or Mycoplasma pneumoniae), consider antibiotic de-escalation to target atypical bacterial infection.    COVID-19 and FLU A/B PCR, 1 HR TAT - Swab, Nasopharynx [[id number]]  (Normal) Collected: 01/30/25 1425    Lab Status: Final result Specimen: Swab from Nasopharynx Updated: 01/30/25 1447     COVID19 Not Detected     Influenza A PCR Not Detected     Influenza B PCR Not Detected    Narrative:      Fact sheet for providers: https://www.fda.gov/media/172672/download    Fact sheet for patients: https://www.fda.gov/media/103469/download    Test performed by PCR.    RSV PCR - Swab, Nasopharynx [[id number]]  (Normal) Collected: 01/30/25 1425    Lab Status: Final result Specimen: Swab from Nasopharynx Updated: 01/30/25 1447     RSV, PCR Not Detected            CT Angiogram Chest Pulmonary Embolism    Result Date: 1/30/2025  Impression: Impression: 1. No pulmonary embolism is seen. 2. Patchy peribronchiolar nodular densities within both lungs suggestive of bronchopneumonia 3. Stable splenic enlargement. 4.  Hepatic steatosis. Electronically Signed: Esthela Begum MD  1/30/2025 4:12 PM EST  Workstation ID: [id number]    CT Head Without Contrast    Result Date: 1/19/2025  Impression: Impression: No acute intracranial process identified. Electronically Signed: Brady Zhang MD  1/19/2025 6:32 PM EST  Workstation ID: [id number]    CT Angiogram Chest Pulmonary Embolism    Result Date: 1/19/2025  Impression: Impression: 1.Negative for pulmonary embolus. 2.No acute cardiopulmonary process. 3.Hepatic steatosis. Electronically Signed: Brady Zhang MD  1/19/2025 5:38 PM EST  Workstation ID: [id number]    XR Chest 1 View    Result Date: 1/19/2025  Impression: Impression: No acute cardiopulmonary abnormality is identified. Electronically Signed: Isabell Decker  1/19/2025 4:49 PM EST  Workstation ID: [id number]             Results for orders placed during the hospital encounter of 01/19/25    Adult Transthoracic Echo Complete W/ Cont if Necessary Per Protocol    Interpretation Summary    Left ventricular systolic function is normal. Calculated left ventricular EF = 65% Left ventricular ejection fraction appears to be 61 - 65%.    Left ventricular diastolic function is consistent with (grade I) impaired relaxation.    Estimated right ventricular systolic pressure from tricuspid regurgitation is normal (<35 mmHg).    No significant valvular abnormalities noted.      Labs Pending at Discharge:  Pending Results       Procedure [Order ID] Specimen - Date/Time    Blood Culture - Blood, [[id number]]     Specimen: Blood     Respiratory Culture - Sputum, Cough [[id number]]     Specimen: Sputum from Cough             Procedures Performed    02/02 1047 Note By: Romana Rocha, CRT    Consults:   Consults       Date and Time Order Name Status Description    1/20/2025  1:21 AM Inpatient Pulmonology Consult Completed             Discharge Details        Discharge Medications        New Medications        Instructions Start Date   albuterol sulfate   (90 Base) MCG/ACT inhaler  Commonly known as: PROVENTIL HFA;VENTOLIN HFA;PROAIR HFA   2 puffs, Inhalation, Every 4 Hours PRN      cefdinir 300 MG capsule  Commonly known as: OMNICEF   300 mg, Oral, 2 Times Daily      predniSONE 10 MG (48) dose pack  Commonly known as: DELTASONE   Use as directed             Continue These Medications        Instructions Start Date   Brexpiprazole 2 MG tablet   2 mg, Oral, Daily      FLUoxetine 20 MG capsule  Commonly known as: PROzac   40 mg, Daily      gabapentin 100 MG capsule  Commonly known as: NEURONTIN   100 mg, 3 Times Daily      hydrOXYzine 25 MG tablet  Commonly known as: ATARAX   50 mg, Oral, Every 6 Hours PRN      ipratropium-albuterol 0.5-2.5 mg/3 ml nebulizer  Commonly known as: DUO-NEB   3 mL, Nebulization, 4 Times Daily - RT      lamoTRIgine 25 MG tablet  Commonly known as: LaMICtal   25 mg, Oral, 2 Times Daily      methadone 5 MG tablet  Commonly known as: DOLOPHINE   177 mg, Daily      methocarbamol 750 MG tablet  Commonly known as: ROBAXIN   750 mg, 3 Times Daily      montelukast 10 MG tablet  Commonly known as: SINGULAIR   10 mg, Oral, Nightly      naloxone 4 MG/0.1ML nasal spray  Commonly known as: NARCAN   Call 911. Don't prime. [place] in 1 nostril for overdose. Repeat in 2-3 minutes in other nostril if no or minimal breathing/responsiveness.      vitamin D 1.25 MG (08966 UT) capsule capsule  Commonly known as: ERGOCALCIFEROL   50,000 Units, Weekly               Allergies   Allergen Reactions    Penicillins Hives     Beta lactam allergy details  Antibiotic reaction: other (throat closed)  Age at reaction: infant  Dose to reaction time: (!) hours  Reason for antibiotic: unknown  Epinephrine required for reaction?: unknown  Tolerated antibiotics: amoxicillin, cephalexin, cefepime           Discharge Disposition:   Home or Self Care    Diet:  Diet Instructions       Diet: Regular/House Diet; Regular (IDDSI 7); Thin (IDDSI 0)      Discharge Diet:  Regular/House Diet    Texture: Regular (IDDSI 7)    Fluid Consistency: Thin (IDDSI 0)            Discharge Activity:   Activity Instructions       Activity as Tolerated              CODE STATUS:  Code Status and Medical Interventions: CPR (Attempt to Resuscitate); Full Support   Ordered at: 02/01/25 1039     Level Of Support Discussed With:    Patient     Code Status (Patient has no pulse and is not breathing):    CPR (Attempt to Resuscitate)     Medical Interventions (Patient has pulse or is breathing):    Full Support       No future appointments.    Additional Instructions for the Follow-ups that You Need to Schedule       Discharge Follow-up with PCP   As directed       Currently Documented PCP:    Ellie Elizabeth APRN    PCP Phone Number:    [phone]     Follow Up Details: 2-3d                Time spent on Discharge including face to face service:  >30 minutes    Signature: Electronically signed by Marlene Etienne MD, 02/02/25, 11:39 EST.  [facility] Hospitalist Team

## 2025-02-03 NOTE — PAYOR COMM NOTE
"AUTHORIZATION PENDING:   PLEASE CALL OR FAX DETERMINATION TO CONTACT BELOW. THANK YOU.        Shelbi De Jesus RN MSN  /UR  [facility]  [phone] office  [phone] fax  [email or web address]    [facility]  NPI: [id number]  Tax: [id number]          Jacqueline Turcios (34 y.o. Female)       Date of Birth   1990    Social Security Number       Address   [address] [place] [address] [address] [place] [address]    Home Phone   [phone]    MRN   [id number]       Jehovah's witness   Adventism    Marital Status   Single                            Admission Date   1/30/25    Admission Type   Urgent    Admitting Provider   Marlene Etienne MD    Attending Provider       Department, Room/Bed   [facility] MEDICAL INPATIENT, 367/1       Discharge Date   2/2/2025    Discharge Disposition   Home or Self Care    Discharge Destination                                 Attending Provider: (none)   Allergies: Penicillins    Isolation: None   Infection: Rhinovirus  (01/19/25)   Code Status: Prior    Ht: 170.2 cm (67\")   Wt: 150 kg (330 lb)    Admission Cmt: None   Principal Problem: Bronchopneumonia [J18.0]                   Active Insurance as of 1/30/2025       Primary Coverage       Payor Plan Insurance Group Employer/Plan Group    [id number]       Payor Plan Address Payor Plan Phone Number Payor Plan Fax Number Effective Dates    MAIL STOP:   10/1/2022 - None Entered    [address] [address]       [facility] [id number]         Subscriber Name Subscriber Birth Date Member ID       JACQUELINE TURCIOS 1990 [id number]                     Emergency Contacts        (Rel.) Home Phone Work Phone Mobile Phone    BC RILEY (Mother) [phone] -- [phone]    PRITI JAMES (Significant Other) -- -- [phone]          02/02/25 1134  Discharge patient  Once     Completed     Expected Discharge Date: 02/02/25  Expected Discharge Time: " "Morning  Discharge Disposition: Home or Self Care  Physician of Record for Attribution - Please select from Treatment Team: CATALINA MATA [[id number]]  Review needed by CMO to determine Physician of Record: No   References:    Link to Physician of Record Policy    25 1138   25 1240  Inpatient Admission  Once     Completed     Level of Care: Telemetry  Diagnosis: Bronchopneumonia [244857]  Admitting Physician: CATALINA MATA [[id number]]  Attending Physician: CATALINA MATA [[id number]]  Certification: I Certify That Inpatient Hospital Services Are Medically Necessary For Greater Than 2 Midnights    25 1239   25 1707  Initiate Observation Status  Once     Completed     Level of Care: Telemetry  Diagnosis: Bronchopneumonia [808256]  Admitting Physician: MALOU MENA [[id number]]  Attending Physician: MALOU MENA [[id number]]                History & Physical        Llanes Alvarez, Carlos, MD at 25 2336              [facility] Medicine Services  History & Physical    Patient Name: Jacqueline Turcios  : 1990  MRN: [id number]  Primary Care Physician:  Ellie Elizabeth APRN  Date of admission: 2025  Date and Time of Service: 2025 at 2140    Subjective      Chief Complaint: \"shortness of breath\"    History of Present Illness: Jacqueline Turcios is a 34 y.o. female with a PMH of PMH of MDD, Morbid Obesity, ongoing smoking around 8 years PPD, Asthma, well known to me from recent admission on  for acute hypoxia, rhinovirus infection  who presented to [facility] on 2025 with  worsening SOB since she woke up today in am, associated with greenish sputum production, subjective fever and chills. Was seen at [facility] ED and was hypoxic SPO2 82 % on room air, requiring 3 L NC. CT chest PE protocol performed showed patchy peribronchiolar nodular densities within both lungs suggestive of bronchopneumonia. The patient was subsequently transferred to [facility] for further evaluation " and management.       Review of Systems   Constitutional:  Positive for chills and fever.   Respiratory:  Positive for cough, shortness of breath and wheezing. Negative for choking and chest tightness.    Cardiovascular:  Negative for chest pain and leg swelling.   Gastrointestinal:  Negative for abdominal pain and diarrhea.   Genitourinary:  Negative for dysuria.   Musculoskeletal:  Negative for back pain.   Psychiatric/Behavioral:  Negative for confusion.        Personal History     Past Medical History:   Diagnosis Date    Asthma     Kidney stone        Past Surgical History:   Procedure Laterality Date     SECTION      CHOLECYSTECTOMY      DILATION AND CURETTAGE, DIAGNOSTIC / THERAPEUTIC      EXCISION LESION N/A 2024    Procedure: EXCISION of cyst from psterior neck, prone position;  Surgeon: Raul Lawson MD;  Location: [facility] MAIN OR;  Service: General;  Laterality: N/A;    URETERAL STENT INSERTION         Family History: family history is not on file. Otherwise pertinent FHx was reviewed and not pertinent to current issue.    Social History:  reports that she has been smoking cigarettes. She started smoking about 12 years ago. She has a 12.1 pack-year smoking history. She has been exposed to tobacco smoke. She has never used smokeless tobacco. She reports that she does not drink alcohol and does not use drugs.    Home Medications:  Prior to Admission Medications       Prescriptions Last Dose Informant Patient Reported? Taking?    FLUoxetine (PROzac) 20 MG capsule 2025  Yes Yes    Take 2 capsules by mouth Daily.    gabapentin (NEURONTIN) 100 MG capsule 2025  Yes Yes    Take 1 capsule by mouth 3 (Three) Times a Day.    hydrOXYzine (ATARAX) 25 MG tablet 2025  No Yes    Take 2 tablets by mouth Every 6 (Six) Hours As Needed for Anxiety.    ipratropium-albuterol (DUO-NEB) 0.5-2.5 mg/3 ml nebulizer 2025  No Yes    Take 3 mL by nebulization 4 (Four) Times a Day.     lamoTRIgine (LaMICtal) 25 MG tablet 1/30/2025  Yes Yes    Take 1 tablet by mouth Daily.    methadone (DOLOPHINE) 5 MG tablet 1/30/2025  Yes Yes    Take 177 mg by mouth Daily.    methocarbamol (ROBAXIN) 750 MG tablet 1/30/2025  Yes Yes    Take 1 tablet by mouth 3 (Three) Times a Day.    vitamin D (ERGOCALCIFEROL) 1.25 MG (82757 UT) capsule capsule 1/28/2025  Yes Yes    Take 1 capsule by mouth 1 (One) Time Per Week.    naloxone (NARCAN) 4 MG/0.1ML nasal spray   No No    Call 911. Don't prime. [place] in 1 nostril for overdose. Repeat in 2-3 minutes in other nostril if no or minimal breathing/responsiveness.              Allergies:  Allergies   Allergen Reactions    Penicillins Hives     Beta lactam allergy details  Antibiotic reaction: other (throat closed)  Age at reaction: infant  Dose to reaction time: (!) hours  Reason for antibiotic: unknown  Epinephrine required for reaction?: unknown  Tolerated antibiotics: amoxicillin, cephalexin           Objective      Vitals:   Temp:  [98.4 °F (36.9 °C)-98.7 °F (37.1 °C)] 98.4 °F (36.9 °C)  Heart Rate:  [] 92  Resp:  [13-22] 16  BP: (107-144)/(67-88) 115/72  Body mass index is 51.69 kg/m².  Physical Exam  Constitutional:       General: She is not in acute distress.     Appearance: She is obese. She is not ill-appearing.   HENT:      Head: Normocephalic.      Nose: Nose normal.      Mouth/Throat:      Mouth: Mucous membranes are dry.   Eyes:      Extraocular Movements: Extraocular movements intact.      Pupils: Pupils are equal, round, and reactive to light.   Cardiovascular:      Rate and Rhythm: Normal rate and regular rhythm.      Pulses: Normal pulses.      Heart sounds: Normal heart sounds. No murmur heard.  Pulmonary:      Effort: Pulmonary effort is normal. No respiratory distress.      Breath sounds: Rales present. No rhonchi.   Abdominal:      General: Abdomen is flat. Bowel sounds are normal.      Palpations: Abdomen is soft.   Musculoskeletal:         General:  Normal range of motion.      Cervical back: Neck supple.   Skin:     General: Skin is warm.   Neurological:      General: No focal deficit present.      Mental Status: She is alert and oriented to person, place, and time.   Psychiatric:         Behavior: Behavior normal.         Diagnostic Data:  Lab Results (last 24 hours)       Procedure Component Value Units Date/Time    MRSA Screen, PCR (Inpatient) - Swab, Nares [[id number]] Collected: 01/30/25 2229    Specimen: Swab from Nares Updated: 01/30/25 2246    Respiratory Panel PCR w/COVID-19(SARS-CoV-2) LOUIE/GIOVANNY/TOMMY/PAD/COR/JANET In-House, NP Swab in UTM/VTM, 2 HR TAT - Swab, Nasopharynx [[id number]] Collected: 01/30/25 2229    Specimen: Swab from Nasopharynx Updated: 01/30/25 2246    Respiratory Panel PCR w/COVID-19(SARS-CoV-2) LOUIE/GIOVANNY/TOMMY/PAD/COR/JANET In-House, NP Swab in UTM/VTM, 2 HR TAT - Swab, Nasopharynx [[id number]]  (Normal) Collected: 01/30/25 1646    Specimen: Swab from Nasopharynx Updated: 01/30/25 2216     ADENOVIRUS, PCR Not Detected     Coronavirus 229E Not Detected     Coronavirus HKU1 Not Detected     Coronavirus NL63 Not Detected     Coronavirus OC43 Not Detected     COVID19 Not Detected     Human Metapneumovirus Not Detected     Human Rhinovirus/Enterovirus Not Detected     Influenza A PCR Not Detected     Influenza B PCR Not Detected     Parainfluenza Virus 1 Not Detected     Parainfluenza Virus 2 Not Detected     Parainfluenza Virus 3 Not Detected     Parainfluenza Virus 4 Not Detected     RSV, PCR Not Detected     Bordetella pertussis pcr Not Detected     Bordetella parapertussis PCR Not Detected     Chlamydophila pneumoniae PCR Not Detected     Mycoplasma pneumo by PCR Not Detected    Narrative:      In the setting of a positive respiratory panel with a viral infection PLUS a negative procalcitonin without other underlying concern for bacterial infection, consider observing off antibiotics or discontinuation of antibiotics and continue supportive care.  If the respiratory panel is positive for atypical bacterial infection (Bordetella pertussis, Chlamydophila pneumoniae, or Mycoplasma pneumoniae), consider antibiotic de-escalation to target atypical bacterial infection.    Lactic Acid, Plasma [[id number]]  (Normal) Collected: 01/30/25 1650    Specimen: Blood from Arm, Left Updated: 01/30/25 1715     Lactate 1.4 mmol/L     Blood Culture - Blood, Arm, Left [[id number]] Collected: 01/30/25 1650    Specimen: Blood from Arm, Left Updated: 01/30/25 1653    BNP [[id number]]  (Normal) Collected: 01/30/25 1504    Specimen: Blood Updated: 01/30/25 1532     proBNP 122.0 pg/mL     Narrative:      This assay is used as an aid in the diagnosis of individuals suspected of having heart failure. It can be used as an aid in the diagnosis of acute decompensated heart failure (ADHF) in patients presenting with signs and symptoms of ADHF to the emergency department (ED). In addition, NT-proBNP of <300 pg/mL indicates ADHF is not likely.    Age Range Result Interpretation  NT-proBNP Concentration (pg/mL:      <50             Positive            >450                   Gray                 300-450                    Negative             <300    50-75           Positive            >900                  Gray                300-900                  Negative            <300      >75             Positive            >1800                  Gray                300-1800                  Negative            <300    High Sensitivity Troponin T [[id number]]  (Normal) Collected: 01/30/25 1504    Specimen: Blood Updated: 01/30/25 1532     HS Troponin T <6 ng/L     Narrative:      High Sensitive Troponin T Reference Range:  <14.0 ng/L- Negative Female for AMI  <22.0 ng/L- Negative Male for AMI  >=14 - Abnormal Female indicating possible myocardial injury.  >=22 - Abnormal Male indicating possible myocardial injury.   Clinicians would have to utilize clinical acumen, EKG, Troponin, and serial changes to  determine if it is an Acute Myocardial Infarction or myocardial injury due to an underlying chronic condition.         Comprehensive Metabolic Panel [[id number]]  (Abnormal) Collected: 01/30/25 1504    Specimen: Blood Updated: 01/30/25 1530     Glucose 118 mg/dL      BUN 5 mg/dL      Creatinine 0.69 mg/dL      Sodium 134 mmol/L      Potassium 4.7 mmol/L      Comment: Slight hemolysis detected by analyzer. Result may be falsely elevated.        Chloride 96 mmol/L      CO2 34.0 mmol/L      Calcium 9.5 mg/dL      Total Protein 6.6 g/dL      Albumin 3.6 g/dL      ALT (SGPT) 32 U/L      AST (SGOT) 19 U/L      Comment: Slight hemolysis detected by analyzer. Result may be falsely elevated.        Alkaline Phosphatase 126 U/L      Total Bilirubin 0.4 mg/dL      Globulin 3.0 gm/dL      A/G Ratio 1.2 g/dL      BUN/Creatinine Ratio 7.2     Anion Gap 4.0 mmol/L      eGFR 117.0 mL/min/1.73     Narrative:      GFR Categories in Chronic Kidney Disease (CKD)      GFR Category          GFR (mL/min/1.73)    Interpretation  G1                     90 or greater         Normal or high (1)  G2                      60-89                Mild decrease (1)  G3a                   45-59                Mild to moderate decrease  G3b                   30-44                Moderate to severe decrease  G4                    15-29                Severe decrease  G5                    14 or less           Kidney failure          (1)In the absence of evidence of kidney disease, neither GFR category G1 or G2 fulfill the criteria for CKD.    eGFR calculation 2021 CKD-EPI creatinine equation, which does not include race as a factor    Pregnancy, Urine - Urine, Clean Catch [[id number]]  (Normal) Collected: 01/30/25 1515    Specimen: Urine, Clean Catch Updated: 01/30/25 1521     HCG, Urine QL Negative    Urinalysis without microscopic (no culture) - Urine, Clean Catch [[id number]]  (Abnormal) Collected: 01/30/25 1515    Specimen: Urine, Clean Catch Updated:  01/30/25 1520     Color, UA Yellow     Appearance, UA Slightly Cloudy     pH, UA 7.5     Specific Gravity, UA 1.020     Glucose, UA Negative     Ketones, UA Negative     Bilirubin, UA Negative     Blood, UA Negative     Protein, UA 30 mg/dL (1+)     Leuk Esterase, UA Negative     Nitrite, UA Negative     Urobilinogen, UA >=8.0 E.U./dL    CBC & Differential [[id number]]  (Abnormal) Collected: 01/30/25 1504    Specimen: Blood Updated: 01/30/25 1514    Narrative:      The following orders were created for panel order CBC & Differential.  Procedure                               Abnormality         Status                     ---------                               -----------         ------                     CBC Auto Differential[[id number]]        Abnormal            Final result                 Please view results for these tests on the individual orders.    CBC Auto Differential [[id number]]  (Abnormal) Collected: 01/30/25 1504    Specimen: Blood Updated: 01/30/25 1514     WBC 14.65 10*3/mm3      RBC 4.40 10*6/mm3      Hemoglobin 11.8 g/dL      Hematocrit 39.8 %      MCV 90.5 fL      MCH 26.8 pg      MCHC 29.6 g/dL      RDW 14.2 %      RDW-SD 47.3 fl      MPV 9.3 fL      Platelets 193 10*3/mm3      Neutrophil % 83.9 %      Lymphocyte % 7.2 %      Monocyte % 8.4 %      Eosinophil % 0.1 %      Basophil % 0.1 %      Immature Grans % 0.3 %      Neutrophils, Absolute 12.29 10*3/mm3      Lymphocytes, Absolute 1.06 10*3/mm3      Monocytes, Absolute 1.23 10*3/mm3      Eosinophils, Absolute 0.02 10*3/mm3      Basophils, Absolute 0.01 10*3/mm3      Immature Grans, Absolute 0.04 10*3/mm3     COVID-19 and FLU A/B PCR, 1 HR TAT - Swab, Nasopharynx [[id number]]  (Normal) Collected: 01/30/25 1425    Specimen: Swab from Nasopharynx Updated: 01/30/25 1447     COVID19 Not Detected     Influenza A PCR Not Detected     Influenza B PCR Not Detected    Narrative:      Fact sheet for providers:  https://www.fda.gov/media/206451/download    Fact sheet for patients: https://www.fda.gov/media/647581/download    Test performed by PCR.    RSV PCR - Swab, Nasopharynx [[id number]]  (Normal) Collected: 01/30/25 1425    Specimen: Swab from Nasopharynx Updated: 01/30/25 1447     RSV, PCR Not Detected             Imaging Results (Last 24 Hours)       Procedure Component Value Units Date/Time    CT Angiogram Chest Pulmonary Embolism [[id number]] Collected: 01/30/25 1609     Updated: 01/30/25 1614    Narrative:      CT ANGIOGRAM CHEST PULMONARY EMBOLISM    Date of Exam: 1/30/2025 3:50 PM EST    Indication: shortness of breath, hypoxia.    Comparison: CT chest 1/19/2025. AP chest 1/19/2025.    Technique: Axial CT images were obtained of the chest after the uneventful intravenous administration of iodinated contrast utilizing pulmonary embolism protocol.  In addition, a 3-D volume rendered image was created for interpretation.  Sagittal and   coronal reconstructions were performed.  Automated exposure control and iterative reconstruction methods were used.      Findings:  Suboptimal contrast opacification of the pulmonary arteries. No pulmonary embolism is identified. Heart size is within normal limits. No significant coronary calcifications. No pericardial effusion or pleural effusion.    Patchy peribronchiolar nodular densities are seen within the posterior right upper lobe and within the posterior medial bilateral lower lobes with relative sparing of the right middle lobe and left upper lobe. Linear subsegmental atelectasis is   demonstrated within the left upper lobe and bilateral lower lobes.    No acute or suspicious osseous abnormalities. Bilateral breast implants are noted. The liver is steatotic. Cholecystectomy changes are present. Spleen size is enlarged at 15.7 cm but appears stable. Remainder of the imaged upper abdominal organs have a   normal appearance.          Impression:      Impression:    1. No  pulmonary embolism is seen.  2. Patchy peribronchiolar nodular densities within both lungs suggestive of bronchopneumonia  3. Stable splenic enlargement.  4. Hepatic steatosis.        Electronically Signed: Esthela Begum MD    1/30/2025 4:12 PM EST    Workstation ID: [id number]              Assessment & Plan        This is a 34 y.o. female with:    Active and Resolved Problems  Active Hospital Problems    Diagnosis  POA    **Bronchopneumonia [J18.0]  Yes      Resolved Hospital Problems   No resolved problems to display.       Bronchopneumonia, post-viral  Acute hypoxic respiratory failure due to Bronchopneumonia and possible underlying OHS  Patient requiring 3 L NC during ED course  Recent hospitalization for acute hypoxia and Rhinovirus infection  CT chest PE protocol negative for PE, there are patchy peribronchiolar nodular densities within both lungs  Started on IV cefepime and IV vancomycin given recent hospitalization, possible hospital acquired pneumonia, will continue for now, may stop vanco if MRSA screen negative   Continue oxygen supplementation, titrate FIO2 to goal SPO2 > 90 %, wean as tolerated  Checking blood and sputum culture, atypical workup      Morbid Obesity  Continue lifestyle modifications        History of depression  Continue home medications    VTE Prophylaxis:  Mechanical VTE prophylaxis orders are present.        The patient desires to be as follows:    CODE STATUS:     Full code      Jaymie Godfreylogg, who can be contacted at , is the designated person to make medical decisions on the patient's behalf if She is incapable of doing so. This was clarified with patient and/or next of kin on 1/30/2025 during the course of this H&P.    Admission Status:  I believe this patient meets inpatient status.    Expected Length of Stay: 2-3    PDMP and Medication Dispenses via Sidebar reviewed and consistent with patient reported medications.    I discussed the patient's findings and my  recommendations with patient, family, and nursing staff.      Signature:     This document has been electronically signed by Carlos Llanes Alvarez, MD on January 30, 2025 23:36 EST   [facility] Hospitalist Team     Electronically signed by Llanes Alvarez, Carlos, MD at 01/31/25 0010          Emergency Department Notes        Samantha Brock, RN at 01/30/25 1931          Pt counseled on importance of EMS transfer d/t oxygen dependence. Pt verbalizes understanding and refuses transport via EMS. Pt agreed to sign EMS refusal form, RN witnessed. Pt educated to go directly to [facility] upon exit of this facility. Pt states she will be stopping at home first for personal items. Pt educated again to go directly to [facility] and have her family member return home for personal items. Pt states she will do this, verbalizes understanding.    Electronically signed by Samantha Brock, RN at 01/30/25 1935       Anjana Samuel RN at 01/30/25 1654          Pt requesting to be transferred to [facility] via POV so she could stop at home prior to going to [facility], reminded pt she was requiring O2 support and she needed to go straight to [facility] with no other stops. Pt stated understanding. KAYLEY RUBIO and Primary RN Elissa made aware.     Electronically signed by Anjana Samuel RN at 01/30/25 1722       Drea Carter APRN at 01/30/25 1506       Attestation signed by Shan Persaud MD at 01/30/25 1846        SHARED APC NON FACE TO FACE: I performed a substantive part of the MDM during the patient's E/M visit. I personally made or approved the documented management plan and acknowledge its risk of complications.   Shan Persaud MD 1/30/2025 18:45 EST                              [facility] ED / URGENT CARE    EMERGENCY DEPARTMENT ENCOUNTER    Room Number:  07/07  Date seen:  1/30/2025  Time seen: 15:06 EST  PCP: Ellie Elizabeth APRN  Historian: Patient    HPI:  Chief complaint: Shortness of breath  Context:Jacqueline MCCANN  Tam is a 34 y.o. female with history of asthma who presents to the ED with c/o shortness of breath.  Patient reports that she was admitted to the hospital 8 days ago for rhinovirus.  She reports that she had to be placed on oxygen due to her oxygen being in the 70s.  She states that she was there for 3 days and they were able to get her oxygen to improve somewhat.  She reports that they did a walking test and her oxygen stayed at 89 so they did not send her home with oxygen.  She reports that she has been having shortness of breath and a fever since waking up.  She reports that her fever was 102 prior to arrival.  She states that she did take some ibuprofen for this.  Patient reports that she is an every day smoker but has cut back due to her recent hospital stay.  Upon arrival the patient was noted to have an oxygen saturation of 82% was placed on 3 L per nasal cannula.  She states that prior to today she was feeling much better.    Timing: Constant  Duration: today  Intensity/Severity: Severe  Associated Symptoms: Shortness of breath, fever      MEDICAL RECORD REVIEW  Asthma    ALLERGIES  Penicillins    PAST MEDICAL HISTORY  Active Ambulatory Problems     Diagnosis Date Noted    Acute hypoxic respiratory failure 2025    Acute bronchitis due to Rhinovirus 2025     Resolved Ambulatory Problems     Diagnosis Date Noted    Sebaceous cyst 2024     Past Medical History:   Diagnosis Date    Asthma     Kidney stone        PAST SURGICAL HISTORY  Past Surgical History:   Procedure Laterality Date     SECTION      CHOLECYSTECTOMY      DILATION AND CURETTAGE, DIAGNOSTIC / THERAPEUTIC      EXCISION LESION N/A 2024    Procedure: EXCISION of cyst from psterior neck, prone position;  Surgeon: Raul Lawson MD;  Location: [facility] MAIN OR;  Service: General;  Laterality: N/A;    URETERAL STENT INSERTION         FAMILY HISTORY  History reviewed. No pertinent family history.    SOCIAL  HISTORY  Social History     Socioeconomic History    Marital status: Single   Tobacco Use    Smoking status: Every Day     Current packs/day: 1.00     Average packs/day: 1 pack/day for 12.1 years (12.1 ttl pk-yrs)     Types: Cigarettes     Start date: 2013     Passive exposure: Current    Smokeless tobacco: Never   Vaping Use    Vaping status: Never Used   Substance and Sexual Activity    Alcohol use: No    Drug use: Never    Sexual activity: Defer       REVIEW OF SYSTEMS  Review of Systems    All systems reviewed and negative except for those discussed in HPI.     PHYSICAL EXAM    I have reviewed the triage vital signs and nursing notes.    ED Triage Vitals   Temp Heart Rate Resp BP SpO2   01/30/25 1422 01/30/25 1413 01/30/25 1422 01/30/25 1422 01/30/25 1413   98.7 °F (37.1 °C) 120 22 144/77 (!) 82 %      Temp src Heart Rate Source Patient Position BP Location FiO2 (%)   01/30/25 1422 -- -- -- --   Oral           Physical Exam  Constitutional:       Appearance: She is well-developed. She is obese.   HENT:      Nose: Nose normal.      Mouth/Throat:      Mouth: Mucous membranes are moist.   Eyes:      Pupils: Pupils are equal, round, and reactive to light.   Cardiovascular:      Rate and Rhythm: Regular rhythm. Tachycardia present.      Pulses: Normal pulses.      Heart sounds: Normal heart sounds.   Pulmonary:      Effort: Pulmonary effort is normal. Tachypnea present.      Breath sounds: Rhonchi present.   Musculoskeletal:         General: Normal range of motion.      Cervical back: Normal range of motion.   Skin:     General: Skin is warm.   Neurological:      General: No focal deficit present.      Mental Status: She is alert.   Psychiatric:         Mood and Affect: Mood normal.         Behavior: Behavior normal.         Vital signs and nursing notes reviewed.        LAB RESULTS  Recent Results (from the past 24 hours)   COVID-19 and FLU A/B PCR, 1 HR TAT - Swab, Nasopharynx    Collection Time: 01/30/25  2:25 PM     Specimen: Nasopharynx; Swab   Result Value Ref Range    COVID19 Not Detected Not Detected - Ref. Range    Influenza A PCR Not Detected Not Detected    Influenza B PCR Not Detected Not Detected   RSV PCR - Swab, Nasopharynx    Collection Time: 01/30/25  2:25 PM    Specimen: Nasopharynx; Swab   Result Value Ref Range    RSV, PCR Not Detected Not Detected   Comprehensive Metabolic Panel    Collection Time: 01/30/25  3:04 PM    Specimen: Blood   Result Value Ref Range    Glucose 118 (H) 65 - 99 mg/dL    BUN 5 (L) 6 - 20 mg/dL    Creatinine 0.69 0.57 - 1.00 mg/dL    Sodium 134 (L) 136 - 145 mmol/L    Potassium 4.7 3.5 - 5.2 mmol/L    Chloride 96 (L) 98 - 107 mmol/L    CO2 34.0 (H) 22.0 - 29.0 mmol/L    Calcium 9.5 8.6 - 10.5 mg/dL    Total Protein 6.6 6.0 - 8.5 g/dL    Albumin 3.6 3.5 - 5.2 g/dL    ALT (SGPT) 32 1 - 33 U/L    AST (SGOT) 19 1 - 32 U/L    Alkaline Phosphatase 126 (H) 39 - 117 U/L    Total Bilirubin 0.4 0.0 - 1.2 mg/dL    Globulin 3.0 gm/dL    A/G Ratio 1.2 g/dL    BUN/Creatinine Ratio 7.2 7.0 - 25.0    Anion Gap 4.0 (L) 5.0 - 15.0 mmol/L    eGFR 117.0 >60.0 mL/min/1.73   BNP    Collection Time: 01/30/25  3:04 PM    Specimen: Blood   Result Value Ref Range    proBNP 122.0 0.0 - 450.0 pg/mL   High Sensitivity Troponin T    Collection Time: 01/30/25  3:04 PM    Specimen: Blood   Result Value Ref Range    HS Troponin T <6 <14 ng/L   CBC Auto Differential    Collection Time: 01/30/25  3:04 PM    Specimen: Blood   Result Value Ref Range    WBC 14.65 (H) 3.40 - 10.80 10*3/mm3    RBC 4.40 3.77 - 5.28 10*6/mm3    Hemoglobin 11.8 (L) 12.0 - 15.9 g/dL    Hematocrit 39.8 34.0 - 46.6 %    MCV 90.5 79.0 - 97.0 fL    MCH 26.8 26.6 - 33.0 pg    MCHC 29.6 (L) 31.5 - 35.7 g/dL    RDW 14.2 12.3 - 15.4 %    RDW-SD 47.3 37.0 - 54.0 fl    MPV 9.3 6.0 - 12.0 fL    Platelets 193 140 - 450 10*3/mm3    Neutrophil % 83.9 (H) 42.7 - 76.0 %    Lymphocyte % 7.2 (L) 19.6 - 45.3 %    Monocyte % 8.4 5.0 - 12.0 %    Eosinophil % 0.1 (L)  0.3 - 6.2 %    Basophil % 0.1 0.0 - 1.5 %    Immature Grans % 0.3 0.0 - 0.5 %    Neutrophils, Absolute 12.29 (H) 1.70 - 7.00 10*3/mm3    Lymphocytes, Absolute 1.06 0.70 - 3.10 10*3/mm3    Monocytes, Absolute 1.23 (H) 0.10 - 0.90 10*3/mm3    Eosinophils, Absolute 0.02 0.00 - 0.40 10*3/mm3    Basophils, Absolute 0.01 0.00 - 0.20 10*3/mm3    Immature Grans, Absolute 0.04 0.00 - 0.05 10*3/mm3   Pregnancy, Urine - Urine, Clean Catch    Collection Time: 01/30/25  3:15 PM    Specimen: Urine, Clean Catch   Result Value Ref Range    HCG, Urine QL Negative Negative   Urinalysis without microscopic (no culture) - Urine, Clean Catch    Collection Time: 01/30/25  3:15 PM    Specimen: Urine, Clean Catch   Result Value Ref Range    Color, UA Yellow Yellow, Straw    Appearance, UA Slightly Cloudy (A) Clear    pH, UA 7.5 5.0 - 8.0    Specific Gravity, UA 1.020 1.005 - 1.030    Glucose, UA Negative Negative    Ketones, UA Negative Negative    Bilirubin, UA Negative Negative    Blood, UA Negative Negative    Protein, UA 30 mg/dL (1+) (A) Negative    Leuk Esterase, UA Negative Negative    Nitrite, UA Negative Negative    Urobilinogen, UA >=8.0 E.U./dL (A) 0.2 - 1.0 E.U./dL   ECG 12 Lead ED Triage Standing Order; SOA    Collection Time: 01/30/25  3:24 PM   Result Value Ref Range    QT Interval 351 ms    QTC Interval 444 ms   Lactic Acid, Plasma    Collection Time: 01/30/25  4:50 PM    Specimen: Arm, Left; Blood   Result Value Ref Range    Lactate 1.4 0.5 - 2.0 mmol/L       Ordered the above labs and independently reviewed the results.      RADIOLOGY RESULTS  CT Angiogram Chest Pulmonary Embolism    Result Date: 1/30/2025  CT ANGIOGRAM CHEST PULMONARY EMBOLISM Date of Exam: 1/30/2025 3:50 PM EST Indication: shortness of breath, hypoxia. Comparison: CT chest 1/19/2025. AP chest 1/19/2025. Technique: Axial CT images were obtained of the chest after the uneventful intravenous administration of iodinated contrast utilizing pulmonary embolism  protocol.  In addition, a 3-D volume rendered image was created for interpretation.  Sagittal and coronal reconstructions were performed.  Automated exposure control and iterative reconstruction methods were used. Findings: Suboptimal contrast opacification of the pulmonary arteries. No pulmonary embolism is identified. Heart size is within normal limits. No significant coronary calcifications. No pericardial effusion or pleural effusion. Patchy peribronchiolar nodular densities are seen within the posterior right upper lobe and within the posterior medial bilateral lower lobes with relative sparing of the right middle lobe and left upper lobe. Linear subsegmental atelectasis is demonstrated within the left upper lobe and bilateral lower lobes. No acute or suspicious osseous abnormalities. Bilateral breast implants are noted. The liver is steatotic. Cholecystectomy changes are present. Spleen size is enlarged at 15.7 cm but appears stable. Remainder of the imaged upper abdominal organs have a normal appearance.     Impression: 1. No pulmonary embolism is seen. 2. Patchy peribronchiolar nodular densities within both lungs suggestive of bronchopneumonia 3. Stable splenic enlargement. 4. Hepatic steatosis. Electronically Signed: Esthela Begum MD  1/30/2025 4:12 PM EST  Workstation ID: [id number]        I ordered the above noted radiological studies. Independently reviewed by me and discussed with radiologist.  See dictation above for official radiology interpretation.      Orders placed during this visit:  Orders Placed This Encounter   Procedures    COVID-19 and FLU A/B PCR, 1 HR TAT - Swab, Nasopharynx    RSV PCR - Swab, Nasopharynx    Respiratory Culture - Sputum, Cough    Blood Culture - Blood,    Blood Culture - Blood,    Respiratory Panel PCR w/COVID-19(SARS-CoV-2) LOUIE/GIOVANNY/TOMMY/PAD/COR/JANET In-House, NP Swab in UTM/VTM, 2 HR TAT - Swab, Nasopharynx    MRSA Screen, PCR (Inpatient) - Swab, Nares    CT Angiogram Chest  Pulmonary Embolism    Comprehensive Metabolic Panel    BNP    High Sensitivity Troponin T    hCG, Serum, Qualitative    CBC Auto Differential    Pregnancy, Urine - Urine, Clean Catch    Urinalysis without microscopic (no culture) - Urine, Clean Catch    Lactic Acid, Plasma    NPO Diet NPO Type: Strict NPO    Undress & Gown    Continuous Pulse Oximetry    Vital Signs    Oxygen Therapy- Nasal Cannula; Titrate 1-6 LPM Per SpO2; 90 - 95%    ECG 12 Lead ED Triage Standing Order; SOA    Insert Peripheral IV    Initiate Observation Status    CBC & Differential    ED Acknowledgement Form Needed;           PROCEDURES    Procedures        MEDICATIONS GIVEN IN ER    Medications   sodium chloride 0.9 % flush 10 mL (has no administration in time range)   Pharmacy to dose vancomycin (has no administration in time range)   vancomycin IVPB 2000 mg in 0.9% Sodium Chloride 500 mL (has no administration in time range)   ipratropium-albuterol (DUO-NEB) nebulizer solution 3 mL (3 mL Nebulization Given 1/30/25 1528)   iopamidol (ISOVUE-370) 76 % injection 100 mL (100 mL Intravenous Given 1/30/25 1606)   cefepime 2000 mg IVPB in 100 mL NS (MBP) (0 mg Intravenous Stopped 1/30/25 1753)         PROGRESS, DATA ANALYSIS, CONSULTS, AND MEDICAL DECISION MAKING    All labs and radiology studies have been independently reviewed by me.     ED Course as of 01/30/25 1811   Thu Jan 30, 2025   1620 Hospitalist called for admission [KJ]   1659 Second call placed to hospitalist.     Due to poor venous access nursing staff was unable to obtain second troponin or second set of cultures.  [KJ]   1718 Patient states that she does not want to go by ambulance. I have told her that I cannot recommend going by private vehicle as she is requiring oxygen. I discussed the risks and benefits of going by private vehicle.     I have spoken with Dr. Daniel from the hospitalist group for admission and he will be accepting. I asked the patient to think about going by  ambulance while she is waiting for a bed at [facility], [place]. [KJ]      ED Course User Index  [KJ] Drea Carter APRN       AS OF 18:11 EST VITALS:    BP - 107/67  HR - 97  TEMP - 98.7 °F (37.1 °C) (Oral)  02 SATS - 94%    Medical Decision Making  Patient is a 34-year-old female who presents today with shortness of breath.  Patient an IV established and blood work was obtained. Presentation not consistent with acute cardiac etiologies to include ACS (non ischemic ekg, unremarkable trop), CHF, pericardial effusion / tamponade . Presentation not consistent with acute respiratory etiologies to include acute PE (Wells low risk), pneumothorax , asthma, COPD exacerbation, allergic etiologies. Presentation also not consistent with non-cardiopulmonary causes to include toxidromes, metabolic etiologies such as acidemia or electrolyte derangements, sepsis, neurologic causes (i.e. demyelinating diseases).  On the patient's CT scan she was noted to have bronchopneumonia.  I spoke with the hospitalist group Dr. Daniel who will be accepting for admission.  At time of admission the patient states that she does not want a go by ambulance that she wants to be able to go home and pack her belongings for the admission.  I strongly discouraged the patient as she is required oxygen.  I discussed the risk and benefits of going by private vehicle.  I again strongly encouraged her that she needs to go by ambulance due to being hypoxic.  Informed the patient that if she does go by private vehicle we will have to remove her IV and have her sign the refusal form.  At the time of writing this note the patient is currently waiting for a room to be clean and is still wanted to go by private vehicle.    Problems Addressed:  Bronchopneumonia: complicated acute illness or injury  Hypoxia: complicated acute illness or injury    Amount and/or Complexity of Data Reviewed  Labs: ordered.  Radiology: ordered.  ECG/medicine tests: ordered.    Risk  OTC  drugs.  Prescription drug management.  Decision regarding hospitalization.          DIAGNOSIS  Final diagnoses:   Bronchopneumonia   Hypoxia       New Medications Ordered This Visit   Medications    sodium chloride 0.9 % flush 10 mL    ipratropium-albuterol (DUO-NEB) nebulizer solution 3 mL    iopamidol (ISOVUE-370) 76 % injection 100 mL    Pharmacy to dose vancomycin    cefepime 2000 mg IVPB in 100 mL NS (MBP)    vancomycin IVPB 2000 mg in 0.9% Sodium Chloride 500 mL           I performed hand hygiene on entry into the pt room and upon exit.      Part of this note may be an electronic transcription/translation of spoken language to printed text using the Dragon Dictation System.     Appropriate PPE worn during exam.    Dictated utilizing Dragon dictation     Note Disclaimer: At [facility], we believe that sharing information builds trust and better relationships. You are receiving this note because you recently visited [facility]. It is possible you will see health information before a provider has talked with you about it. This kind of information can be easy to misunderstand. To help you fully understand what it means for your health, we urge you to discuss this note with your provider.             Electronically signed by Shan Persaud MD at 01/30/25 1846          Operative/Procedure Notes (all)        Romana Rocha, CRT at 02/02/25 1047  Version 1 of 1         Exercise Oximetry    Patient Name:Jacqueline Turcios   MRN: [id number]   Date: 02/02/25             ROOM AIR BASELINE   SpO2% 94   Heart Rate 85   Blood Pressure      EXERCISE ON ROOM AIR SpO2% EXERCISE ON O2 @  LPM SpO2%   1 MINUTE 95 1 MINUTE    2 MINUTES 95 2 MINUTES    3 MINUTES 94 3 MINUTES    4 MINUTES 94 4 MINUTES    5 MINUTES 93 5 MINUTES    6 MINUTES 95 6 MINUTES               Distance Walked   Distance Walked   Dyspnea (Jojo Scale)   Dyspnea (Jojo Scale)   Fatigue (Jojo Scale)   Fatigue (Jojo Scale)   SpO2% Post Exercise   SpO2% Post  Exercise   HR Post Exercise   HR Post Exercise   Time to Recovery   Time to Recovery     Comments: Patient does not require home O2 at this time   Romana Rocha CRT      Electronically signed by Romana Rocha CRT at 25 1055          Physician Progress Notes (all)        Marlene Etienne MD at 25 1028                  [facility] MEDICINE SERVICE  DAILY PROGRESS NOTE    NAME: Jacqueline Turcios  : 1990  MRN: [id number]      LOS: 0 days     PROVIDER OF SERVICE: Marlene Etienne MD    Chief Complaint: Bronchopneumonia    Subjective:     Interval History:    Patient seen and evaluated at bedside.   On 1l/nc, o2 sats =92%  Treatment plan discussed with patient. All questions addressed.     Review of Systems:   All 21 ROS were negative except mentioned above.    Objective:     Vital Signs  Temp:  [98 °F (36.7 °C)-99.5 °F (37.5 °C)] 98.3 °F (36.8 °C)  Heart Rate:  [] 100  Resp:  [14-20] 19  BP: (110-150)/(65-86) 111/71  Flow (L/min) (Oxygen Therapy):  [2-3] 2   Body mass index is 51.69 kg/m².    Physical Exam   General: No acute distress, appears stated age  Neuro: Awake and alert, oriented x3, no focal deficits appreciated  Head: Atraumatic, normocephalic  HEENT: EOMI, anicteric, normal sclerae and conjunctivae, moist mucus membranes  Neck: supple, no lymphadenopathy  CV: RRR, soft heart sounds, no murmurs appreciated, no peripheral edema  Pulm: Decreased breath sounds,  increased work of breathing, no adventitious sounds  Abd: Soft, nontender, nondistended  Skin: Warm, dry and intact  Psych: Appropriate mood and affect    Scheduled Meds   Brexpiprazole, 2 mg, Oral, Daily  cefepime, 2,000 mg, Intravenous, Q8H  FLUoxetine, 40 mg, Oral, Daily  gabapentin, 100 mg, Oral, TID  ipratropium-albuterol, 3 mL, Nebulization, Q6H - RT  lamoTRIgine, 25 mg, Oral, BID  methadone, 177 mg, Oral, Daily  methocarbamol, 750 mg, Oral, TID  methylPREDNISolone sodium succinate, 40 mg, Intravenous, Q8H  montelukast, 10 mg,  Oral, Nightly  nicotine, 1 patch, Transdermal, Q24H  pantoprazole, 40 mg, Oral, QAM AC  sodium chloride, 10 mL, Intravenous, Q12H  [START ON 2/4/2025] vitamin D, 50,000 Units, Oral, Weekly       PRN Meds     acetaminophen **OR** acetaminophen **OR** acetaminophen    senna-docusate sodium **AND** polyethylene glycol **AND** bisacodyl **AND** bisacodyl    Calcium Replacement - Follow Nurse / BPA Driven Protocol    guaiFENesin-dextromethorphan    hydrOXYzine    Magnesium Standard Dose Replacement - Follow Nurse / BPA Driven Protocol    nitroglycerin    Pharmacy to Dose Cefepime    Phosphorus Replacement - Follow Nurse / BPA Driven Protocol    Potassium Replacement - Follow Nurse / BPA Driven Protocol    sodium chloride    sodium chloride    sodium chloride   Infusions  Pharmacy to Dose Cefepime,           Diagnostic Data    Results from last 7 days   Lab Units 02/01/25  0457   WBC 10*3/mm3 13.13*   HEMOGLOBIN g/dL 10.9*   HEMATOCRIT % 36.9   PLATELETS 10*3/mm3 217   GLUCOSE mg/dL 211*   CREATININE mg/dL 0.60   BUN mg/dL 6   SODIUM mmol/L 138   POTASSIUM mmol/L 4.4   AST (SGOT) U/L 15   ALT (SGPT) U/L 24   ALK PHOS U/L 113   BILIRUBIN mg/dL <0.2   ANION GAP mmol/L 8.6       CT Angiogram Chest Pulmonary Embolism    Result Date: 1/30/2025  Impression: 1. No pulmonary embolism is seen. 2. Patchy peribronchiolar nodular densities within both lungs suggestive of bronchopneumonia 3. Stable splenic enlargement. 4. Hepatic steatosis. Electronically Signed: Esthela Begum MD  1/30/2025 4:12 PM EST  Workstation ID: [id number]     Interval results reviewed.    Assessment/Plan:   Acute hypoxic respiratory failure  Bilateral pneumonia-healthcare associated pneumonia  Asthma  Morbid obesity  Smoker  Hepatic steatosis  Splenomegaly     Continue patient on cefepime, urine Legionella antigen negative and patient on methadone increased risk of QT interval prolongation.    MRSA DNA probe negative, blood culture = no gr  Cont  Steroids  Continue oxygen on 3 L per nasal cannula.  Baseline is room air  Patient received cefepime and vancomycin in the ER.  Patient with recent hospitalization  CT angiogram negative for PE     on methadone        Treatment plan discussed with RN.     VTE Prophylaxis:  Mechanical VTE prophylaxis orders are present.         Code status is   There are no questions and answers to display.       Plan for disposition:     Barriers to Discharge: Hypoxia  Anticipated Date of Discharge: 25  Place of Discharge:  home         Time: 40 minutes     Signature: Electronically signed by Marlene Etienne MD, 25, 10:30 EST.  [facility], [place], operated by [facility] Hospitalist Team       Electronically signed by Marlene Etienne MD at 25 1038       Marlene Etienne MD at 25 0933              [facility] MEDICINE SERVICE  DAILY PROGRESS NOTE    NAME: Jacqueline Turcios  : 1990  MRN: [id number]      LOS: 0 days     PROVIDER OF SERVICE: Marlene Etienne MD    Chief Complaint: Bronchopneumonia    Subjective:     Interval History:    Patient seen and evaluated at bedside.   H&P, labs and imaging reviewed.  Patient smokes 1-1/2 pack/day for 4 years decreased to half a pack per day for 1 week.  Baseline is room air patient on 3 L per nasal cannula O2 sats 95%.  Bowel movement was 2 days ago.  Treatment plan discussed with patient. All questions addressed.     Review of Systems:   All 21 ROS were negative except mentioned above.    Objective:     Vital Signs  Temp:  [97.6 °F (36.4 °C)-98.7 °F (37.1 °C)] 97.6 °F (36.4 °C)  Heart Rate:  [] 93  Resp:  [13-22] 16  BP: (107-144)/(64-88) 121/76  Flow (L/min) (Oxygen Therapy):  [2-3] 3   Body mass index is 51.69 kg/m².    Physical Exam   General: Moderate respiratory distress, appears stated age  Neuro: Awake and alert, oriented x3, no focal deficits appreciated  Head: Atraumatic, normocephalic  HEENT: EOMI, anicteric, normal sclerae and conjunctivae, moist mucus membranes  Neck: supple, no  lymphadenopathy  CV: RRR, soft heart sounds, no murmurs appreciated, no peripheral edema  Pulm: Decreased breath sounds,  increased work of breathing, wheezing on right lung posteriorly  Abd: Soft, nontender, nondistended  Skin: Warm, dry and intact  Psych: Appropriate mood and affect    Scheduled Meds   azithromycin, 500 mg, Oral, Q24H  cefepime, 2,000 mg, Intravenous, Q8H  FLUoxetine, 40 mg, Oral, Daily  gabapentin, 100 mg, Oral, TID  ipratropium-albuterol, 3 mL, Nebulization, Q6H - RT  lamoTRIgine, 25 mg, Oral, Daily  methadone, 160 mg, Oral, Daily  methocarbamol, 750 mg, Oral, TID  sodium chloride, 10 mL, Intravenous, Q12H  [START ON 2/4/2025] vitamin D, 50,000 Units, Oral, Weekly       PRN Meds     acetaminophen **OR** acetaminophen **OR** acetaminophen    senna-docusate sodium **AND** polyethylene glycol **AND** bisacodyl **AND** bisacodyl    Calcium Replacement - Follow Nurse / BPA Driven Protocol    guaiFENesin-dextromethorphan    hydrOXYzine    Magnesium Standard Dose Replacement - Follow Nurse / BPA Driven Protocol    nitroglycerin    Pharmacy to Dose Cefepime    Phosphorus Replacement - Follow Nurse / BPA Driven Protocol    Potassium Replacement - Follow Nurse / BPA Driven Protocol    sodium chloride    sodium chloride    sodium chloride   Infusions  Pharmacy to Dose Cefepime,           Diagnostic Data    Results from last 7 days   Lab Units 01/31/25  0142   WBC 10*3/mm3 12.07*   HEMOGLOBIN g/dL 10.6*   HEMATOCRIT % 36.4   PLATELETS 10*3/mm3 193   GLUCOSE mg/dL 135*   CREATININE mg/dL 0.64   BUN mg/dL 7   SODIUM mmol/L 135*   POTASSIUM mmol/L 3.9   AST (SGOT) U/L 19   ALT (SGPT) U/L 30   ALK PHOS U/L 114   BILIRUBIN mg/dL 0.3   ANION GAP mmol/L 8.8       CT Angiogram Chest Pulmonary Embolism    Result Date: 1/30/2025  Impression: 1. No pulmonary embolism is seen. 2. Patchy peribronchiolar nodular densities within both lungs suggestive of bronchopneumonia 3. Stable splenic enlargement. 4. Hepatic  steatosis. Electronically Signed: Esthela Begum MD  2025 4:12 PM EST  Workstation ID: [id number]     Interval results reviewed.    Assessment/Plan:   Acute hypoxic respiratory failure  Bilateral pneumonia-healthcare associated pneumonia  Asthma  Morbid obesity  On methadone  Smoker  Hepatic steatosis  Splenomegaly    Continue patient on cefepime and  DC azithromycin as urine Legionella antigen negative and patient on methadone increased risk of QT interval prolongation.  MRSA DNA probe negative, blood culture pending  Steroids  Continue oxygen on 3 L per nasal cannula.  Baseline is room air  Patient received cefepime and vancomycin in the ER.  Patient with recent hospitalization  CT angiogram negative for PE    Restart methadone    Treatment plan discussed with RN.     VTE Prophylaxis:  Mechanical VTE prophylaxis orders are present.         Code status is   There are no questions and answers to display.       Plan for disposition:     Barriers to Discharge: Hypoxia  Anticipated Date of Discharge: 25  Place of Discharge:  home      Time: 40 minutes     Signature: Electronically signed by Marlene Etienne MD, 25, 09:33 EST.  [facility] Hospitalist Team     Electronically signed by Marlene Etienne MD at 25 0948       Consult Notes (all)    No notes of this type exist for this encounter.          Discharge Summary        Marlene Etienne MD at 25 1138                       [facility] Medicine Services  Discharge Summary    Date of Service: 2025  Patient Name: Jacqueline Turcios  : 1990  MRN: [id number]    Date of Admission: 2025  Discharge Diagnosis: Bronchopneumonia  Date of Discharge: 2025  Primary Care Physician: Ellie Elizabeth APRN    Presenting Problem:   Bronchopneumonia [J18.0]  Hypoxia [R09.02]    Active and Resolved Hospital Problems:  Active Hospital Problems    Diagnosis POA    **Bronchopneumonia [J18.0] Yes      Resolved Hospital Problems   No resolved problems to  "display.         Hospital Course     HPI:  \"Jacqueline Turcios is a 34 y.o. female with a PMH of PMH of MDD, Morbid Obesity, ongoing smoking around 8 years PPD, Asthma, well known to me from recent admission on 1/19 for acute hypoxia, rhinovirus infection  who presented to [facility] on 1/30/2025 with  worsening SOB since she woke up today in am, associated with greenish sputum production, subjective fever and chills. Was seen at [facility] ED and was hypoxic SPO2 82 % on room air, requiring 3 L NC. CT chest PE protocol performed showed patchy peribronchiolar nodular densities within both lungs suggestive of bronchopneumonia. The patient was subsequently transferred to [facility] for further evaluation and management. \"    Hospital Course:  Patient was admitted to the for bronchopneumonia she received cefepime and vancomycin.  Patient was started on 3 L oxygen per nasal cannula O2 sats were 95%.  Azithromycin was discontinued as urine Legionella antigen was negative and patient was on methadone so risk of increased QT interval.  Her MRSA DNA probe was negative so vancomycin was also discontinued and patient was continued on cefepime.  Her blood cultures were no growth.  Her CT angiogram was negative for PE.  During hospitalization her oxygen demand decreases and 6-minute walk test was performed before discharge and she passed the test and does not need any home oxygen.  Her IV steroids were changed to p.o. tapering dose of prednisone.  Patient will need to see if PCP in 2 to 3 days.  Patient stable for discharge    DISCHARGE Follow Up Recommendations for labs and diagnostics:   Follow-up with PCP in 2 to 3 days    Reasons For Change In Medications and Indications for New Medications:  Cefdinir 100 mg 1 tablet twice daily for 5 days.    Day of Discharge     Vital Signs:  Temp:  [97.9 °F (36.6 °C)-98.9 °F (37.2 °C)] 98.7 °F (37.1 °C)  Heart Rate:  [] 85  Resp:  [11-22] 14  BP: (102-145)/(62-84) " 145/84  Flow (L/min) (Oxygen Therapy):  [2] 2    Physical Exam:  Vitals and nursing note reviewed.   Constitutional:       Appearance: Normal appearance.   HENT:      Head: Normocephalic and atraumatic.   Cardiovascular:      Rate and Rhythm: Normal rate and rhythm.      Heart sounds: Normal heart sounds.   Pulmonary:      Effort: Pulmonary effort is normal.      Breath sounds: Decreased breath sounds.   Abdominal:      Palpations: Abdomen is soft.   Musculoskeletal:      Cervical back: Neck supple.   Neurological:      Mental Status: Mental status is at baseline.     Pertinent  and/or Most Recent Results     LAB RESULTS:      Lab 02/02/25  0350 02/01/25 0457 01/31/25  0142 01/30/25  1650 01/30/25  1504   WBC 14.86* 13.13* 12.07*  --  14.65*   HEMOGLOBIN 10.6* 10.9* 10.6*  --  11.8*   HEMATOCRIT 36.0 36.9 36.4  --  39.8   PLATELETS 235 217 193  --  193   NEUTROS ABS 12.39* 10.84* 9.33*  --  12.29*   IMMATURE GRANS (ABS) 0.26* 0.22* 0.05  --  0.04   LYMPHS ABS 1.32 1.10 1.65  --  1.06   MONOS ABS 0.87 0.94* 0.98*  --  1.23*   EOS ABS 0.00 0.00 0.04  --  0.02   MCV 89.8 90.0 91.0  --  90.5   LACTATE  --   --   --  1.4  --          Lab 02/02/25  0350 02/01/25 0457 01/31/25  0142 01/30/25  1504   SODIUM 136 138 135* 134*   POTASSIUM 4.5 4.4 3.9 4.7   CHLORIDE 101 102 98 96*   CO2 25.6 27.4 28.2 34.0*   ANION GAP 9.4 8.6 8.8 4.0*   BUN 9 6 7 5*   CREATININE 0.66 0.60 0.64 0.69   EGFR 118.2 121.0 119.1 117.0   GLUCOSE 246* 211* 135* 118*   CALCIUM 9.0 9.3 9.0 9.5         Lab 02/02/25  0350 02/01/25 0457 01/31/25  0142 01/30/25  1504   TOTAL PROTEIN 6.9 6.8 6.3 6.6   ALBUMIN 3.6 3.5 3.3* 3.6   GLOBULIN 3.3 3.3 3.0 3.0   ALT (SGPT) 24 24 30 32   AST (SGOT) 12 15 19 19   BILIRUBIN <0.2 <0.2 0.3 0.4   ALK PHOS 109 113 114 126*         Lab 01/30/25  1504   PROBNP 122.0   HSTROP T <6                 Brief Urine Lab Results  (Last result in the past 365 days)        Color   Clarity   Blood   Leuk Est   Nitrite   Protein    CREAT   Urine HCG        01/30/25 1515               Negative       01/30/25 1515 Yellow   Slightly Cloudy   Negative   Negative   Negative   30 mg/dL (1+)                 Microbiology Results (last 10 days)       Procedure Component Value - Date/Time    Legionella Antigen, Urine - Urine, Urine, Clean Catch [[id number]]  (Normal) Collected: 01/31/25 0147    Lab Status: Final result Specimen: Urine, Clean Catch Updated: 01/31/25 0454     LEGIONELLA ANTIGEN, URINE Negative    S. Pneumo Ag Urine or CSF - Urine, Urine, Clean Catch [[id number]]  (Normal) Collected: 01/31/25 0147    Lab Status: Final result Specimen: Urine, Clean Catch Updated: 01/31/25 0454     Strep Pneumo Ag Negative    MRSA Screen, PCR (Inpatient) - Swab, Nares [[id number]]  (Normal) Collected: 01/30/25 2229    Lab Status: Final result Specimen: Swab from Nares Updated: 01/31/25 0036     MRSA PCR No MRSA Detected    Narrative:      The negative predictive value of this diagnostic test is high and should only be used to consider de-escalating anti-MRSA therapy. A positive result may indicate colonization with MRSA and must be correlated clinically.    Respiratory Panel PCR w/COVID-19(SARS-CoV-2) LOUIE/GIOVANNY/TOMMY/PAD/COR/JANET In-House, NP Swab in UTM/VTM, 2 HR TAT - Swab, Nasopharynx [[id number]]  (Normal) Collected: 01/30/25 2229    Lab Status: Final result Specimen: Swab from Nasopharynx Updated: 01/30/25 2337     ADENOVIRUS, PCR Not Detected     Coronavirus 229E Not Detected     Coronavirus HKU1 Not Detected     Coronavirus NL63 Not Detected     Coronavirus OC43 Not Detected     COVID19 Not Detected     Human Metapneumovirus Not Detected     Human Rhinovirus/Enterovirus Not Detected     Influenza A PCR Not Detected     Influenza B PCR Not Detected     Parainfluenza Virus 1 Not Detected     Parainfluenza Virus 2 Not Detected     Parainfluenza Virus 3 Not Detected     Parainfluenza Virus 4 Not Detected     RSV, PCR Not Detected     Bordetella pertussis pcr Not  Detected     Bordetella parapertussis PCR Not Detected     Chlamydophila pneumoniae PCR Not Detected     Mycoplasma pneumo by PCR Not Detected    Narrative:      In the setting of a positive respiratory panel with a viral infection PLUS a negative procalcitonin without other underlying concern for bacterial infection, consider observing off antibiotics or discontinuation of antibiotics and continue supportive care. If the respiratory panel is positive for atypical bacterial infection (Bordetella pertussis, Chlamydophila pneumoniae, or Mycoplasma pneumoniae), consider antibiotic de-escalation to target atypical bacterial infection.    Blood Culture - Blood, Arm, Left [[id number]]  (Normal) Collected: 01/30/25 1650    Lab Status: Preliminary result Specimen: Blood from Arm, Left Updated: 02/01/25 1700     Blood Culture No growth at 2 days    Narrative:      Less than seven (7) mL's of blood was collected.  Insufficient quantity may yield false negative results.    Respiratory Panel PCR w/COVID-19(SARS-CoV-2) LOUIE/GIOVANNY/TOMMY/PAD/COR/JANET In-House, NP Swab in UTM/VTM, 2 HR TAT - Swab, Nasopharynx [[id number]]  (Normal) Collected: 01/30/25 1646    Lab Status: Final result Specimen: Swab from Nasopharynx Updated: 01/30/25 2216     ADENOVIRUS, PCR Not Detected     Coronavirus 229E Not Detected     Coronavirus HKU1 Not Detected     Coronavirus NL63 Not Detected     Coronavirus OC43 Not Detected     COVID19 Not Detected     Human Metapneumovirus Not Detected     Human Rhinovirus/Enterovirus Not Detected     Influenza A PCR Not Detected     Influenza B PCR Not Detected     Parainfluenza Virus 1 Not Detected     Parainfluenza Virus 2 Not Detected     Parainfluenza Virus 3 Not Detected     Parainfluenza Virus 4 Not Detected     RSV, PCR Not Detected     Bordetella pertussis pcr Not Detected     Bordetella parapertussis PCR Not Detected     Chlamydophila pneumoniae PCR Not Detected     Mycoplasma pneumo by PCR Not Detected     Narrative:      In the setting of a positive respiratory panel with a viral infection PLUS a negative procalcitonin without other underlying concern for bacterial infection, consider observing off antibiotics or discontinuation of antibiotics and continue supportive care. If the respiratory panel is positive for atypical bacterial infection (Bordetella pertussis, Chlamydophila pneumoniae, or Mycoplasma pneumoniae), consider antibiotic de-escalation to target atypical bacterial infection.    COVID-19 and FLU A/B PCR, 1 HR TAT - Swab, Nasopharynx [[id number]]  (Normal) Collected: 01/30/25 1425    Lab Status: Final result Specimen: Swab from Nasopharynx Updated: 01/30/25 1447     COVID19 Not Detected     Influenza A PCR Not Detected     Influenza B PCR Not Detected    Narrative:      Fact sheet for providers: https://www.fda.gov/media/693068/download    Fact sheet for patients: https://www.fda.gov/media/367672/download    Test performed by PCR.    RSV PCR - Swab, Nasopharynx [[id number]]  (Normal) Collected: 01/30/25 1425    Lab Status: Final result Specimen: Swab from Nasopharynx Updated: 01/30/25 1447     RSV, PCR Not Detected            CT Angiogram Chest Pulmonary Embolism    Result Date: 1/30/2025  Impression: Impression: 1. No pulmonary embolism is seen. 2. Patchy peribronchiolar nodular densities within both lungs suggestive of bronchopneumonia 3. Stable splenic enlargement. 4. Hepatic steatosis. Electronically Signed: Esthela Begum MD  1/30/2025 4:12 PM EST  Workstation ID: [id number]    CT Head Without Contrast    Result Date: 1/19/2025  Impression: Impression: No acute intracranial process identified. Electronically Signed: Brady Zhang MD  1/19/2025 6:32 PM EST  Workstation ID: [id number]    CT Angiogram Chest Pulmonary Embolism    Result Date: 1/19/2025  Impression: Impression: 1.Negative for pulmonary embolus. 2.No acute cardiopulmonary process. 3.Hepatic steatosis. Electronically Signed: Brady Zhang  MD  1/19/2025 5:38 PM EST  Workstation ID: [id number]    XR Chest 1 View    Result Date: 1/19/2025  Impression: Impression: No acute cardiopulmonary abnormality is identified. Electronically Signed: Isabell Decker  1/19/2025 4:49 PM EST  Workstation ID: [id number]             Results for orders placed during the hospital encounter of 01/19/25    Adult Transthoracic Echo Complete W/ Cont if Necessary Per Protocol    Interpretation Summary    Left ventricular systolic function is normal. Calculated left ventricular EF = 65% Left ventricular ejection fraction appears to be 61 - 65%.    Left ventricular diastolic function is consistent with (grade I) impaired relaxation.    Estimated right ventricular systolic pressure from tricuspid regurgitation is normal (<35 mmHg).    No significant valvular abnormalities noted.      Labs Pending at Discharge:  Pending Results       Procedure [Order ID] Specimen - Date/Time    Blood Culture - Blood, [[id number]]     Specimen: Blood     Respiratory Culture - Sputum, Cough [[id number]]     Specimen: Sputum from Cough             Procedures Performed    02/02 1047 Note By: Romana Rocha, CRT    Consults:   Consults       Date and Time Order Name Status Description    1/20/2025  1:21 AM Inpatient Pulmonology Consult Completed             Discharge Details        Discharge Medications        New Medications        Instructions Start Date   albuterol sulfate  (90 Base) MCG/ACT inhaler  Commonly known as: PROVENTIL HFA;VENTOLIN HFA;PROAIR HFA   2 puffs, Inhalation, Every 4 Hours PRN      cefdinir 300 MG capsule  Commonly known as: OMNICEF   300 mg, Oral, 2 Times Daily      predniSONE 10 MG (48) dose pack  Commonly known as: DELTASONE   Use as directed             Continue These Medications        Instructions Start Date   Brexpiprazole 2 MG tablet   2 mg, Oral, Daily      FLUoxetine 20 MG capsule  Commonly known as: PROzac   40 mg, Daily      gabapentin 100 MG capsule  Commonly known  as: NEURONTIN   100 mg, 3 Times Daily      hydrOXYzine 25 MG tablet  Commonly known as: ATARAX   50 mg, Oral, Every 6 Hours PRN      ipratropium-albuterol 0.5-2.5 mg/3 ml nebulizer  Commonly known as: DUO-NEB   3 mL, Nebulization, 4 Times Daily - RT      lamoTRIgine 25 MG tablet  Commonly known as: LaMICtal   25 mg, Oral, 2 Times Daily      methadone 5 MG tablet  Commonly known as: DOLOPHINE   177 mg, Daily      methocarbamol 750 MG tablet  Commonly known as: ROBAXIN   750 mg, 3 Times Daily      montelukast 10 MG tablet  Commonly known as: SINGULAIR   10 mg, Oral, Nightly      naloxone 4 MG/0.1ML nasal spray  Commonly known as: NARCAN   Call 911. Don't prime. [place] in 1 nostril for overdose. Repeat in 2-3 minutes in other nostril if no or minimal breathing/responsiveness.      vitamin D 1.25 MG (29509 UT) capsule capsule  Commonly known as: ERGOCALCIFEROL   50,000 Units, Weekly               Allergies   Allergen Reactions    Penicillins Hives     Beta lactam allergy details  Antibiotic reaction: other (throat closed)  Age at reaction: infant  Dose to reaction time: (!) hours  Reason for antibiotic: unknown  Epinephrine required for reaction?: unknown  Tolerated antibiotics: amoxicillin, cephalexin, cefepime           Discharge Disposition:   Home or Self Care    Diet:  Diet Instructions       Diet: Regular/House Diet; Regular (IDDSI 7); Thin (IDDSI 0)      Discharge Diet: Regular/House Diet    Texture: Regular (IDDSI 7)    Fluid Consistency: Thin (IDDSI 0)            Discharge Activity:   Activity Instructions       Activity as Tolerated              CODE STATUS:  Code Status and Medical Interventions: CPR (Attempt to Resuscitate); Full Support   Ordered at: 02/01/25 1039     Level Of Support Discussed With:    Patient     Code Status (Patient has no pulse and is not breathing):    CPR (Attempt to Resuscitate)     Medical Interventions (Patient has pulse or is breathing):    Full Support       No future  appointments.    Additional Instructions for the Follow-ups that You Need to Schedule       Discharge Follow-up with PCP   As directed       Currently Documented PCP:    Ellie Elizabeth APRN    PCP Phone Number:    [phone]     Follow Up Details: 2-3d                Time spent on Discharge including face to face service:  >30 minutes    Signature: Electronically signed by Marlene Etienne MD, 02/02/25, 11:39 EST.  [facility]     Electronically signed by Marlene Etienne MD at 02/02/25 1141       Discharge Order (From admission, onward)       Start     Ordered    02/02/25 1134  Discharge patient  Once        Expected Discharge Date: 02/02/25   Expected Discharge Time: Morning   Discharge Disposition: Home or Self Care   Physician of Record for Attribution - Please select from Treatment Team: MARLENE ETIENNE [[id number]]   Review needed by CMO to determine Physician of Record: No      Question Answer Comment   Physician of Record for Attribution - Please select from Treatment Team MARLENE ETIENNE    Review needed by CMO to determine Physician of Record No        02/02/25 1138

## 2025-02-03 NOTE — OUTREACH NOTE
OBSTETRIC INITIAL VISIT    CHIEF COMPLAINT:   Chief Complaint   Patient presents with   • Initial Prenatal Visit       SUBJECTIVE:  Janay Layne is a 35 year old  woman who presents for her first OB visit at 8w4d by LMP with an estimated date of delivery of 2019.   Periods are regular.             This is a planned / desired pregnancy.              FOB: [facility], involved    Last pap: 2017, history of abnormals: yes Leep , paps WNL ever sicne.    EPDS = 10    GENETIC SCREENING/TERATOLOGY COUNSELING    Infection Risk  Hx  Travel Plans: no  High Risk Hepatitis B: NO  Exposure to TB: NO  Patient with hx of Genital Herpes: NO   (In her prenatal records from 2017, Herpes Simplex was on problem list. Pt denies ever having outbreak or lesions)  Sexual partner with hx of Genital Herpes: NO  Hx of STD (GC, Chlamydia, Syphilis, HPV): NO  Rash, Viral, or Febrile Illness since LMP: NO  Exposure to Cat Litter: NO  Chicken Pox Immune Status: Hx of Disease:  Immune  Hx of Parvovirus (Fifth Disease): NO  Occupational Exposure to Children: none     Genetics Concerns: yes AMA, was on Paxil first 4 weeks of pregnancy.    Previous pregnancy complications: c/s x 2  1) Emergency c/s, eIOL, dilated to 10 cm, NRFHT with pushing.  2) Elective RCS    Current pregnancy concerns: Nausea, vomiting. Has lost 5 pounds since becoming pregnant. She reports that she is vomiting an average of 3 times a day, but, the majority is dry heaving. She attributes certain triggers, like dairy, citrus. She wakes up with nausea, but after eating, it gets worse. However, if she keeps herself busy, she reports its a good distraction.  Denies bleeding, cramping, loss of fluid.    Patient has a history of anxiety. She was on paxil prior to becoming pregnant, self d/c'd when she was appx 4 weeks pregnant. Denies SI/HI ideation. States she is up at night about two nights a week with anxious thoughts. She has a good support system. She would like to  Prep Survey      Flowsheet Row Responses   [facility] from? [facility]   Is LACE score < 7 ? No   Eligibility Readm Mgmt   Discharge diagnosis Bronchopneumonia   Does the patient have one of the following disease processes/diagnoses(primary or secondary)? Pneumonia   Does the patient have Home health ordered? No   Is there a DME ordered? No   Prep survey completed? Yes            Lisseth JARVIS - Registered Nurse           speak with SW.     OB History    Para Term  AB Living   3 2 2 0 0 2   SAB TAB Ectopic Molar Multiple Live Births   0 0 0 0 0 2      # Outcome Date GA Lbr Esteban/2nd Weight Sex Delivery Anes PTL Lv   3 Current            2 Term    3.685 kg (8 lb 2 oz) M CS-LTranv  N MARYANN   1 Term 2016 39w0d  3.487 kg (7 lb 11 oz) M CS-LTranv EPI N MARAYNN      Complications: Fetal Intolerance      Obstetric Comments   Regular menses     Q 28-31 days.     Menstrual bleeding usually lasts 4 days.    Medium menses     Age at menarche was 13 years old.         PROBLEM LIST:  Patient Active Problem List   Diagnosis   • Anxiety   • Obesity, unspecified   • AMA (advanced maternal age) multigravida 35+   • Pregnancy   • Nausea/vomiting in pregnancy                    OB History    Para Term  AB Living   3 2 2 0 0 2   SAB TAB Ectopic Molar Multiple Live Births   0 0 0 0 0 2   Obstetric Comments   Regular menses     Q 28-31 days.     Menstrual bleeding usually lasts 4 days.    Medium menses     Age at menarche was 13 years old.        REVIEW OF SYSTEMS:  Constitutional:  Denies headache. Denies fevers or chills. No weight changes.  HEENT:  Denies vision changes or hearing changes. No sinus problems. No sore throat.  Breasts:  Denies breast masses, pain or nipple discharge.  Respiratory:  Denies shortness of breath or cough. No dyspnea. No wheeze.  Cardiovascular:  Denies chest pain, syncope or palpitations.  Gastrointestinal:  Denies diarrhea, or constipation. No blood in the stool.  Genitourinary: Denies dyspareunia, vaginal discharge, or vaginal bleeding. Denies dysuria, change in urinary frequency or urinary incontinence.  Endocrine:  Denies hot flashes, night sweats, or hot or cold intolerance.  Hematologic:  Denies easy bruising or bleeding disorders. Denies swollen glands.  Allergies/Immunologic:  Denies seasonal allergies or any history of immunologic disorders.  Neurologic:  Normal sensation and motor control.   No history of seizures. No blackouts.  Musculoskeletal:  Denies joint pain, swelling, or erythema. Denies muscle aches.  Skin:  Denies rashes, significant lesions or pruritis.  Psychiatric:  Denies memory deficits     PAST MEDICAL HX:     Anxiety                                                       Depressive disorder                                           Obesity                                                       Allergic conjunctivitis of both eyes            2018    Depression                                      2018      PAST SURGICAL HX:      SECTION, LOW TRANSVERSE                                Comment: x 2    LEEP                                                                 FAMILY HX:     Family History   Problem Relation Age of Onset   • Cancer, Breast Neg Hx    • Cancer, Colon Neg Hx    • Cancer, Endometrial Neg Hx    • Cancer, Ovarian Neg Hx    • Diabetes Neg Hx    • Hypertension Neg Hx       Review of patient's family status indicates:    Neg Hx                                                    SOCIAL HX:               Social History     Socioeconomic History   • Marital status: /Civil Union     Spouse name: Not on file   • Number of children: Not on file   • Years of education: Not on file   • Highest education level: Not on file   Occupational History   • Not on file   Social Needs   • Financial resource strain: Not on file   • Food insecurity:     Worry: Never true     Inability: Never true   • Transportation needs:     Medical: No     Non-medical: No   Tobacco Use   • Smoking status: Former Smoker     Packs/day: 0.00     Years: 10.00     Pack years: 0.00   • Smokeless tobacco: Never Used   • Tobacco comment: Quit Smoking 2019   Substance and Sexual Activity   • Alcohol use: Not Currently     Comment: socially    • Drug use: No   • Sexual activity: Yes   Lifestyle   • Physical activity:     Days per week: 7 days     Minutes per session: Not on file    • Stress: To some extent   Relationships   • Social connections:     Talks on phone: Not on file     Gets together: Not on file     Attends Yarsani service: Not on file     Active member of club or organization: Not on file     Attends meetings of clubs or organizations: Not on file     Relationship status: Not on file   • Intimate partner violence:     Fear of current or ex partner: No     Emotionally abused: No     Physically abused: No     Forced sexual activity: No   Other Topics Concern   • Not on file   Social History Narrative   • Not on file                PHYSICAL EXAM:  Visit Vitals  /71   Pulse 79   Temp 97.9 °F (36.6 °C) (Oral)   Ht 5' 8\" (1.727 m)   Wt 119.7 kg (264 lb)   LMP 03/07/2019   BMI 40.14 kg/m²     General: Alert, normal affect, in no acute distress.  Skin: Warm and dry. No rashes, bruises or active lesions.  HEENT:  Normocephalic, atraumatic. Extraocular movements grossly intact. No conjunctival erythema or scleral icterus. Normal nasal septum and turbinates.  Mucous membranes pink and moist.  Neck: Neck supple without lymphadenopathy or thyromegaly. No masses palpated.  Cardiovascular: Regular rate and rhythm. No murmurs, rubs or gallops. Peripheral pulses intact. No edema bilaterally.  Respiratory: Lungs clear to auscultation bilaterally. No rales, rhonchi or wheezes. Normal effort.  Breasts: Exam performed seated and supine; no palpable masses, non-tender, no nipple discharge or lymphadenopathy.  Abdomen: Gravid. Soft, nontender. Normal active bowel sounds. No masses. No hepatosplenomegaly.  Neurologic: Cranial nerves 2-12 grossly intact. No tremors or clonus.  Musculoskeletal: Normal muscle tone and development bilaterally.  Psychiatric: Mood stable. Alert and oriented x3. Normal affect.  Genitorinary: Inspection of external genitalia reveals normal mons pubis, pubic  hair distribution, labia minora, majora and clitoris. Sterile speculum exam reveals pink and moist vaginal  mucosa.  Cervix is free from any lesions. No cervical motion tenderness.  Gravid uterus.  No adnexal tenderness or masses palpated.  Anus free from lesions or hemorrhoids.      ASSESSMENT AND PLAN:  Janay Layne is a 35 year old  woman at 8w4d by LMP with an estimated date of delivery of 2019 who presents today to establish prenatal care.     1. Reviewed prenatal lab work and imaging to be completed.  2. Advised no travel after 36 weeks and to wear compression stockings during traveling.  3. Discussed healthy eating, total recommended weight gain for pregnancy.   4. To avoid cold lunch meat, raw/uncooked seafood.  5. Advised on provider care model.  6. Reviewed frequency of visits.  7. Educated on OB emergencies and reasons to go to OB ER.    Pt verbalized understanding of plan of care. All questions answered to her liking.     Patient Active Problem List    Diagnosis Date Noted   • AMA (advanced maternal age) multigravida 35+ 2019     Priority: Low     Genetics [  ]     • Pregnancy 2019     Priority: Low     · EDC: 19  · Accepts Blood: Y  · Anesthesia Consult: N  · 1T Labs: orders given   · Genetic screen: [ ]Carrier screen   · Anatomy US:   · 3T Labs:  · GBBS:  · Immunization:  [x]Flu vaccine     [  ]Tdap  · Contraception: tubal, needs IDPA signed [  ]  · Breast/Formula  · Boy/Girl, \"Name\"       • Nausea/vomiting in pregnancy 2019     Priority: Low     Doxy/B6     • Anxiety 2018     Priority: Low     Denies SI/HI  Self d/c'd Paxil in April   referral [  ]     • Obesity, unspecified 2018     Priority: Low     Early 1 hr [  ]  Discussed TWG, nutrition, exercise       Janay was seen today for initial prenatal visit.    Diagnoses and all orders for this visit:    Class 3 severe obesity due to excess calories without serious comorbidity with body mass index (BMI) of 40.0 to 44.9 in adult (CMS/[facility])  -     GLUCOSE 1 HR PP; Future    Pregnancy, unspecified  gestational age  -     Cancel: VZV IMMUNITY IGG; Future  -     URINE CULTURE  -     CHLAMYDIA/GC BY NUCLEIC ACID AMPLIFICATION  -     GLUCOSE 1 HR PP; Future  -     GROUP, RH AND SCREEN (NOT FOR TRANSFUSION); Future  -     RUBELLA ANTIBODY IGG; Future  -     TREPONEMA PALLIDUM ANTIBODY IGG; Future  -     CBC & AUTO DIFFERENTIAL; Future  -     HEPATITIS B SURFACE ANTIGEN; Future  -     HIV ANTIGEN/ANTIBODY SCREEN; Future  -     US OB LESS THAN 14 WKS AND  OB TRANSVAG SINGLE GESTATION; Future  -     SERVICE TO GENETIC COUNSELING  -      OB LESS THAN 14 WKS AND  OB TRANSVAG SINGLE GESTATION    Screening examination for STD (sexually transmitted disease)  -     Cancel: CHLAMYDIA/GC NUCLEIC ACID AMPLIFCATION, THIN PREP    Nausea/vomiting in pregnancy  -     Doxylamine Succinate, Sleep, 25 MG Tab; Take 1 tablet by mouth at bedtime.  -     Pyridoxine HCl (VITAMIN B-6) 25 MG tablet; Take 1 tablet by mouth 2 times daily (before meals).    Amenorrhea  -     POCT URINE PREGNANCY    Anxiety        Return to clinic in: 4 weeks.

## 2025-02-03 NOTE — CASE MANAGEMENT/SOCIAL WORK
Case Management Discharge Note      Final Note: Routine home.      Selected Continued Care - Discharged on 2/2/2025 Admission date: 1/30/2025 - Discharge disposition: Home or Self Care       Transportation Services  Private: Car    Final Discharge Disposition Code: 01 - home or self-care

## 2025-02-04 LAB — BACTERIA SPEC AEROBE CULT: NORMAL

## 2025-02-05 ENCOUNTER — READMISSION MANAGEMENT (OUTPATIENT)
Dept: CALL CENTER | Facility: HOSPITAL | Age: 35
End: 2025-02-05
Payer: MEDICAID

## 2025-02-05 ENCOUNTER — APPOINTMENT (OUTPATIENT)
Dept: GENERAL RADIOLOGY | Facility: HOSPITAL | Age: 35
End: 2025-02-05
Payer: MEDICAID

## 2025-02-05 ENCOUNTER — HOSPITAL ENCOUNTER (EMERGENCY)
Facility: HOSPITAL | Age: 35
Discharge: HOME | End: 2025-02-05
Payer: MEDICAID

## 2025-02-05 VITALS
HEART RATE: 83 BPM | BODY MASS INDEX: 45.99 KG/M2 | OXYGEN SATURATION: 91 % | SYSTOLIC BLOOD PRESSURE: 150 MMHG | HEIGHT: 67 IN | TEMPERATURE: 98 F | WEIGHT: 293 LBS | DIASTOLIC BLOOD PRESSURE: 85 MMHG | RESPIRATION RATE: 14 BRPM

## 2025-02-05 DIAGNOSIS — J18.0 BRONCHOPNEUMONIA: ICD-10-CM

## 2025-02-05 DIAGNOSIS — R06.00 DYSPNEA, UNSPECIFIED TYPE: Primary | ICD-10-CM

## 2025-02-05 LAB
ALBUMIN SERPL-MCNC: 3.6 G/DL (ref 3.5–5.2)
ALBUMIN/GLOB SERPL: 1.2 G/DL
ALP SERPL-CCNC: 108 U/L (ref 39–117)
ALT SERPL W P-5'-P-CCNC: 37 U/L (ref 1–33)
ANION GAP SERPL CALCULATED.3IONS-SCNC: 8.1 MMOL/L (ref 5–15)
AST SERPL-CCNC: 19 U/L (ref 1–32)
ATMOSPHERIC PRESS: ABNORMAL MM[HG]
B PARAPERT DNA SPEC QL NAA+PROBE: NOT DETECTED
B PERT DNA SPEC QL NAA+PROBE: NOT DETECTED
BASE EXCESS BLDV CALC-SCNC: 8.4 MMOL/L (ref -2–2)
BASOPHILS # BLD AUTO: 0.04 10*3/MM3 (ref 0–0.2)
BASOPHILS NFR BLD AUTO: 0.3 % (ref 0–1.5)
BDY SITE: ABNORMAL
BILIRUB SERPL-MCNC: 0.4 MG/DL (ref 0–1.2)
BILIRUB UR QL STRIP: NEGATIVE
BUN SERPL-MCNC: 9 MG/DL (ref 6–20)
BUN/CREAT SERPL: 16.1 (ref 7–25)
C PNEUM DNA NPH QL NAA+NON-PROBE: NOT DETECTED
CALCIUM SPEC-SCNC: 9 MG/DL (ref 8.6–10.5)
CHLORIDE SERPL-SCNC: 97 MMOL/L (ref 98–107)
CLARITY UR: CLEAR
CO2 BLDA-SCNC: 37.2 MMOL/L (ref 22–29)
CO2 SERPL-SCNC: 29.9 MMOL/L (ref 22–29)
COLOR UR: YELLOW
CREAT SERPL-MCNC: 0.56 MG/DL (ref 0.57–1)
D-LACTATE SERPL-SCNC: 1.2 MMOL/L (ref 0.3–2)
DEPRECATED RDW RBC AUTO: 45.6 FL (ref 37–54)
EGFRCR SERPLBLD CKD-EPI 2021: 123 ML/MIN/1.73
EOSINOPHIL # BLD AUTO: 0.12 10*3/MM3 (ref 0–0.4)
EOSINOPHIL NFR BLD AUTO: 0.9 % (ref 0.3–6.2)
ERYTHROCYTE [DISTWIDTH] IN BLOOD BY AUTOMATED COUNT: 14.2 % (ref 12.3–15.4)
FLUAV SUBTYP SPEC NAA+PROBE: NOT DETECTED
FLUBV RNA ISLT QL NAA+PROBE: NOT DETECTED
GLOBULIN UR ELPH-MCNC: 3 GM/DL
GLUCOSE SERPL-MCNC: 93 MG/DL (ref 65–99)
GLUCOSE UR STRIP-MCNC: NEGATIVE MG/DL
HADV DNA SPEC NAA+PROBE: NOT DETECTED
HCG SERPL QL: NEGATIVE
HCO3 BLDV-SCNC: 35.5 MMOL/L (ref 22–26)
HCOV 229E RNA SPEC QL NAA+PROBE: NOT DETECTED
HCOV HKU1 RNA SPEC QL NAA+PROBE: NOT DETECTED
HCOV NL63 RNA SPEC QL NAA+PROBE: NOT DETECTED
HCOV OC43 RNA SPEC QL NAA+PROBE: NOT DETECTED
HCT VFR BLD AUTO: 41 % (ref 34–46.6)
HGB BLD-MCNC: 12.3 G/DL (ref 12–15.9)
HGB UR QL STRIP.AUTO: NEGATIVE
HMPV RNA NPH QL NAA+NON-PROBE: NOT DETECTED
HPIV1 RNA ISLT QL NAA+PROBE: NOT DETECTED
HPIV2 RNA SPEC QL NAA+PROBE: NOT DETECTED
HPIV3 RNA NPH QL NAA+PROBE: NOT DETECTED
HPIV4 P GENE NPH QL NAA+PROBE: NOT DETECTED
IMM GRANULOCYTES # BLD AUTO: 0.19 10*3/MM3 (ref 0–0.05)
IMM GRANULOCYTES NFR BLD AUTO: 1.5 % (ref 0–0.5)
INHALED O2 CONCENTRATION: 21 %
KETONES UR QL STRIP: NEGATIVE
LEUKOCYTE ESTERASE UR QL STRIP.AUTO: NEGATIVE
LYMPHOCYTES # BLD AUTO: 2.91 10*3/MM3 (ref 0.7–3.1)
LYMPHOCYTES NFR BLD AUTO: 22.4 % (ref 19.6–45.3)
M PNEUMO IGG SER IA-ACNC: NOT DETECTED
MCH RBC QN AUTO: 26.7 PG (ref 26.6–33)
MCHC RBC AUTO-ENTMCNC: 30 G/DL (ref 31.5–35.7)
MCV RBC AUTO: 88.9 FL (ref 79–97)
MODALITY: ABNORMAL
MONOCYTES # BLD AUTO: 0.88 10*3/MM3 (ref 0.1–0.9)
MONOCYTES NFR BLD AUTO: 6.8 % (ref 5–12)
NEUTROPHILS NFR BLD AUTO: 68.1 % (ref 42.7–76)
NEUTROPHILS NFR BLD AUTO: 8.84 10*3/MM3 (ref 1.7–7)
NITRITE UR QL STRIP: NEGATIVE
NRBC BLD AUTO-RTO: 0 /100 WBC (ref 0–0.2)
NT-PROBNP SERPL-MCNC: <36 PG/ML (ref 0–450)
PCO2 BLDV: 57.4 MM HG (ref 42–51)
PH BLDV: 7.4 PH UNITS (ref 7.32–7.43)
PH UR STRIP.AUTO: 8.5 [PH] (ref 5–8)
PLATELET # BLD AUTO: 205 10*3/MM3 (ref 140–450)
PMV BLD AUTO: 9.7 FL (ref 6–12)
PO2 BLDV: 53.8 MM HG (ref 40–42)
POTASSIUM SERPL-SCNC: 4.2 MMOL/L (ref 3.5–5.2)
PROCALCITONIN SERPL-MCNC: 0.04 NG/ML (ref 0–0.25)
PROT SERPL-MCNC: 6.6 G/DL (ref 6–8.5)
PROT UR QL STRIP: NEGATIVE
RBC # BLD AUTO: 4.61 10*6/MM3 (ref 3.77–5.28)
RHINOVIRUS RNA SPEC NAA+PROBE: NOT DETECTED
RSV RNA NPH QL NAA+NON-PROBE: NOT DETECTED
SAO2 % BLDCOV: 86.4 % (ref 45–75)
SARS-COV-2 RNA RESP QL NAA+PROBE: NOT DETECTED
SODIUM SERPL-SCNC: 135 MMOL/L (ref 136–145)
SP GR UR STRIP: 1.02 (ref 1–1.03)
UROBILINOGEN UR QL STRIP: ABNORMAL
WBC NRBC COR # BLD AUTO: 12.98 10*3/MM3 (ref 3.4–10.8)

## 2025-02-05 PROCEDURE — 94640 AIRWAY INHALATION TREATMENT: CPT

## 2025-02-05 PROCEDURE — 80053 COMPREHEN METABOLIC PANEL: CPT | Performed by: OCCUPATIONAL THERAPIST

## 2025-02-05 PROCEDURE — 96374 THER/PROPH/DIAG INJ IV PUSH: CPT

## 2025-02-05 PROCEDURE — 83880 ASSAY OF NATRIURETIC PEPTIDE: CPT | Performed by: OCCUPATIONAL THERAPIST

## 2025-02-05 PROCEDURE — 87040 BLOOD CULTURE FOR BACTERIA: CPT | Performed by: OCCUPATIONAL THERAPIST

## 2025-02-05 PROCEDURE — 83605 ASSAY OF LACTIC ACID: CPT | Performed by: OCCUPATIONAL THERAPIST

## 2025-02-05 PROCEDURE — 84703 CHORIONIC GONADOTROPIN ASSAY: CPT | Performed by: OCCUPATIONAL THERAPIST

## 2025-02-05 PROCEDURE — 0202U NFCT DS 22 TRGT SARS-COV-2: CPT | Performed by: OCCUPATIONAL THERAPIST

## 2025-02-05 PROCEDURE — 81003 URINALYSIS AUTO W/O SCOPE: CPT | Performed by: OCCUPATIONAL THERAPIST

## 2025-02-05 PROCEDURE — 93005 ELECTROCARDIOGRAM TRACING: CPT

## 2025-02-05 PROCEDURE — 71045 X-RAY EXAM CHEST 1 VIEW: CPT

## 2025-02-05 PROCEDURE — 94761 N-INVAS EAR/PLS OXIMETRY MLT: CPT

## 2025-02-05 PROCEDURE — 82803 BLOOD GASES ANY COMBINATION: CPT | Performed by: OCCUPATIONAL THERAPIST

## 2025-02-05 PROCEDURE — 99284 EMERGENCY DEPT VISIT MOD MDM: CPT

## 2025-02-05 PROCEDURE — 85025 COMPLETE CBC W/AUTO DIFF WBC: CPT | Performed by: OCCUPATIONAL THERAPIST

## 2025-02-05 PROCEDURE — 84145 PROCALCITONIN (PCT): CPT | Performed by: OCCUPATIONAL THERAPIST

## 2025-02-05 PROCEDURE — 25010000002 METHYLPREDNISOLONE PER 125 MG: Performed by: OCCUPATIONAL THERAPIST

## 2025-02-05 PROCEDURE — 36415 COLL VENOUS BLD VENIPUNCTURE: CPT

## 2025-02-05 PROCEDURE — 94799 UNLISTED PULMONARY SVC/PX: CPT

## 2025-02-05 RX ORDER — IPRATROPIUM BROMIDE AND ALBUTEROL SULFATE 2.5; .5 MG/3ML; MG/3ML
3 SOLUTION RESPIRATORY (INHALATION) ONCE
Status: COMPLETED | OUTPATIENT
Start: 2025-02-05 | End: 2025-02-05

## 2025-02-05 RX ORDER — CEFDINIR 300 MG/1
300 CAPSULE ORAL 2 TIMES DAILY
Qty: 10 CAPSULE | Refills: 0 | Status: SHIPPED | OUTPATIENT
Start: 2025-02-05 | End: 2025-02-10

## 2025-02-05 RX ORDER — METHYLPREDNISOLONE 4 MG/1
TABLET ORAL
Qty: 21 TABLET | Refills: 0 | Status: SHIPPED | OUTPATIENT
Start: 2025-02-05

## 2025-02-05 RX ORDER — SODIUM CHLORIDE 0.9 % (FLUSH) 0.9 %
10 SYRINGE (ML) INJECTION AS NEEDED
Status: DISCONTINUED | OUTPATIENT
Start: 2025-02-05 | End: 2025-02-05 | Stop reason: HOSPADM

## 2025-02-05 RX ORDER — METHYLPREDNISOLONE SODIUM SUCCINATE 125 MG/2ML
125 INJECTION, POWDER, LYOPHILIZED, FOR SOLUTION INTRAMUSCULAR; INTRAVENOUS ONCE
Status: COMPLETED | OUTPATIENT
Start: 2025-02-05 | End: 2025-02-05

## 2025-02-05 RX ADMIN — IPRATROPIUM BROMIDE AND ALBUTEROL SULFATE 3 ML: .5; 3 SOLUTION RESPIRATORY (INHALATION) at 12:09

## 2025-02-05 RX ADMIN — METHYLPREDNISOLONE SODIUM SUCCINATE 125 MG: 125 INJECTION, POWDER, FOR SOLUTION INTRAMUSCULAR; INTRAVENOUS at 12:42

## 2025-02-05 NOTE — CASE MANAGEMENT/SOCIAL WORK
Discharge Planning Assessment   [facility]     Patient Name: Jacqueline Turcios  MRN: [id number]  Today's Date: 2/5/2025    Admit Date: 2/5/2025    Plan: Home with family   Discharge Needs Assessment       Row Name 02/05/25 1153       Living Environment    People in Home child(lalit), dependent;other relative(s);parent(s);significant other    Name(s) of People in Home Mom Jaymie; Kam Byrd, minor children (9yo; 20month old); Aunt    Current Living Arrangements home    Potentially Unsafe Housing Conditions none    In the past 12 months has the electric, gas, oil, or water company threatened to shut off services in your home? No    Primary Care Provided by self    Provides Primary Care For child(lalit)    Family Caregiver if Needed significant other;parent(s)    Family Caregiver Names Jomar Coon; kam Byrd    Quality of Family Relationships supportive;helpful    Able to Return to Prior Arrangements yes       Resource/Environmental Concerns    Resource/Environmental Concerns none    Transportation Concerns none       Transportation Needs    In the past 12 months, has lack of transportation kept you from medical appointments or from getting medications? no    In the past 12 months, has lack of transportation kept you from meetings, work, or from getting things needed for daily living? No       Food Insecurity    Within the past 12 months, you worried that your food would run out before you got the money to buy more. Never true    Within the past 12 months, the food you bought just didn't last and you didn't have money to get more. Never true       Transition Planning    Patient/Family Anticipates Transition to home with family    Patient/Family Anticipated Services at Transition none    Transportation Anticipated family or friend will provide       Discharge Needs Assessment    Equipment Currently Used at Home nebulizer    Concerns to be Addressed denies needs/concerns at this time    Anticipated Changes Related to Illness none     "Equipment Needed After Discharge none    Current Discharge Risk chronically ill                   Discharge Plan       Row Name 02/05/25 1155       Plan    Plan Home with family    Plan Comments Pt reports she lives with family and is independent with all ADLs. She confirms she has nebulizer at home, but denies having other dme at home. She doesn't drive, and states mom or fiance will provide transportation at discharge. She confirms pcp and reports she has appointment with her on 2/14/25.Pt has not made follow-up appointment with pulmonolgist for follow-up, and was agreeable to  attempting to do so. I called Dr. Hayward's office, but had to leave a message on [facility] requesting pt or myself be called back to schedule a hospital follow-up appointment for patient. Pt informed she may be receiving a call from Dr. Hayward's office. Phone number for Dr. Hayward's office also placed on patient's AVS. Pt reports she was \"so ready to leave last time, I didn't really pay attention\" to the discharge instructions. Pt reports she was unaware she had new prescriptions,and did not  any new prescriptions from most recent discharge. She reports she is taking duonebs that were prescribed at discharge prior to that one. She is agreeable to meds to bed and this was updated in [facility]. She was informed that if she discharges from the ER, she will need to go to the [facility] in the [facility] to  new prescriptions if she has any. She verbalized understanding. Pharmacy updated in [employer]. She denies any new dc needs. Currently on room air with O2 sat >90%.  ER Evaluation in progress. Disposition to be determined.                  Continued Care and Services - Admitted Since 2/5/2025    No active coordination exists for this encounter.          Demographic Summary       Row Name 02/05/25 1151       General Information    Admission Type other (see comments)  ER Evalutation in progress; disposition to be " determined    Arrived From home    Referral Source high risk screening    Reason for Consult discharge planning    Preferred Language English       Contact Information    Permission Granted to Share Info With                    Functional Status       Row Name 02/05/25 1153       Functional Status    Usual Activity Tolerance good    Current Activity Tolerance good       Functional Status, IADL    Medications independent    Meal Preparation independent    Housekeeping independent    Laundry independent    Shopping independent               Dedra Feldman RN, [facility]  Office: [phone]  Fax: [phone]  [email or web address]      I met with patient in room wearing PPE: mask, eyewear  Maintained distance greater than six feet and spent </=15 minutes in the room  Dedra Feldman RN

## 2025-02-05 NOTE — Clinical Note
[facility] EMERGENCY DEPARTMENT  [address] [facility] [place] [address]  Phone: [phone]    Jacqueline Turcios was seen and treated in our emergency department on 2/5/2025.  She may return to work on 02/07/2025.         Thank you for choosing [facility].    Jenni Hassan PA-C

## 2025-02-05 NOTE — ED PROVIDER NOTES
Subjective   History of Present Illness  Patient is a 34-year-old female with past medical history significant for asthma presenting to the ER for evaluation of shortness of breath.  She reports that she was just discharged home on  from hospital after being diagnosed with pneumonia.  She reports that she was diagnosed with rhinovirus about a month ago and has had difficulty since.  Patient states that she is not taking any medications at home besides an antidepressant and her albuterol inhaler 4 times a day.  She reports that the inhaler is not helping.  Patient denies chest pain, nausea, vomiting, diarrhea, hematuria, hematochezia.  No melena or changes in bowel habits.  She reports that she just feels that she is working harder to breathe.  This morning, she woke up and had pain in her swelling in her arms and legs which is new for her.      Patient told case management that she did not  her prescriptions after she was discharged from the hospital.        Review of Systems   Constitutional:  Negative for chills and fever.       Past Medical History:   Diagnosis Date    Asthma     Kidney stone        Allergies   Allergen Reactions    Penicillins Hives     Beta lactam allergy details  Antibiotic reaction: other (throat closed)  Age at reaction: infant  Dose to reaction time: (!) hours  Reason for antibiotic: unknown  Epinephrine required for reaction?: unknown  Tolerated antibiotics: amoxicillin, cephalexin, cefepime           Past Surgical History:   Procedure Laterality Date     SECTION      CHOLECYSTECTOMY      DILATION AND CURETTAGE, DIAGNOSTIC / THERAPEUTIC      EXCISION LESION N/A 2024    Procedure: EXCISION of cyst from psterior neck, prone position;  Surgeon: Raul Lawson MD;  Location: [facility] MAIN OR;  Service: General;  Laterality: N/A;    URETERAL STENT INSERTION         No family history on file.    Social History     Socioeconomic History    Marital status:  Single   Tobacco Use    Smoking status: Every Day     Current packs/day: 1.00     Average packs/day: 1 pack/day for 12.1 years (12.1 ttl pk-yrs)     Types: Cigarettes     Start date: 2013     Passive exposure: Current    Smokeless tobacco: Never   Vaping Use    Vaping status: Never Used   Substance and Sexual Activity    Alcohol use: No    Drug use: Never    Sexual activity: Defer           Objective   Physical Exam  Vitals and nursing note reviewed.   Constitutional:       General: She is not in acute distress.     Appearance: She is well-developed. She is ill-appearing. She is not toxic-appearing or diaphoretic.   HENT:      Head: Normocephalic and atraumatic.      Mouth/Throat:      Mouth: Mucous membranes are moist.   Eyes:      Extraocular Movements: Extraocular movements intact.      Pupils: Pupils are equal, round, and reactive to light.   Neck:      Vascular: No JVD.      Trachea: No tracheal deviation.   Cardiovascular:      Rate and Rhythm: Normal rate and regular rhythm. No extrasystoles are present.     Pulses: No decreased pulses.      Heart sounds: Normal heart sounds. No murmur heard.     No friction rub. No gallop.   Pulmonary:      Effort: No accessory muscle usage or respiratory distress.      Breath sounds: No stridor. Wheezing and rhonchi present. No decreased breath sounds.   Chest:      Chest wall: No deformity, tenderness or crepitus.   Abdominal:      General: Bowel sounds are normal.      Palpations: Abdomen is soft.      Tenderness: There is no abdominal tenderness. There is no guarding.   Musculoskeletal:      Cervical back: Normal range of motion and neck supple.      Right lower leg: Edema present.      Left lower leg: Edema present.   Lymphadenopathy:      Cervical: No cervical adenopathy.   Skin:     General: Skin is warm and dry.      Capillary Refill: Capillary refill takes less than 2 seconds.      Coloration: Skin is not cyanotic.      Findings: No rash.   Neurological:       General: No focal deficit present.      Mental Status: She is alert.   Psychiatric:         Mood and Affect: Mood normal.         Behavior: Behavior normal.         Procedures           ED Course  ED Course as of 02/05/25 1915 Wed Feb 05, 2025   1241 Waiting on lab [ME]   1333 Discussed labs with patient.  She is still feeling poorly.  Vital signs are normal on room air. [ME]   1408 Discussed pt with Dr. Roman Argueta   [ME]      ED Course User Index  [ME] Jenni Hassan PA-C      Labs Reviewed   COMPREHENSIVE METABOLIC PANEL - Abnormal; Notable for the following components:       Result Value    Creatinine 0.56 (*)     Sodium 135 (*)     Chloride 97 (*)     CO2 29.9 (*)     ALT (SGPT) 37 (*)     All other components within normal limits    Narrative:     GFR Categories in Chronic Kidney Disease (CKD)      GFR Category          GFR (mL/min/1.73)    Interpretation  G1                     90 or greater         Normal or high (1)  G2                      60-89                Mild decrease (1)  G3a                   45-59                Mild to moderate decrease  G3b                   30-44                Moderate to severe decrease  G4                    15-29                Severe decrease  G5                    14 or less           Kidney failure          (1)In the absence of evidence of kidney disease, neither GFR category G1 or G2 fulfill the criteria for CKD.    eGFR calculation 2021 CKD-EPI creatinine equation, which does not include race as a factor   URINALYSIS W/ CULTURE IF INDICATED - Abnormal; Notable for the following components:    pH, UA 8.5 (*)     All other components within normal limits    Narrative:     In absence of clinical symptoms, the presence of pyuria, bacteria, and/or nitrites on the urinalysis result does not correlate with infection.  Urine microscopic not indicated.   CBC WITH AUTO DIFFERENTIAL - Abnormal; Notable for the following components:    WBC 12.98 (*)     MCHC 30.0 (*)      Immature Grans % 1.5 (*)     Neutrophils, Absolute 8.84 (*)     Immature Grans, Absolute 0.19 (*)     All other components within normal limits   BLOOD GAS, VENOUS - Abnormal; Notable for the following components:    pCO2, Venous 57.4 (*)     pO2, Venous 53.8 (*)     HCO3, Venous 35.5 (*)     Base Excess, Venous 8.4 (*)     O2 Saturation, Venous 86.4 (*)     CO2 Content 37.2 (*)     All other components within normal limits   RESPIRATORY PANEL PCR W/ COVID-19 (SARS-COV-2), NP SWAB IN UTM/VTP, 2 HR TAT - Normal    Narrative:     In the setting of a positive respiratory panel with a viral infection PLUS a negative procalcitonin without other underlying concern for bacterial infection, consider observing off antibiotics or discontinuation of antibiotics and continue supportive care. If the respiratory panel is positive for atypical bacterial infection (Bordetella pertussis, Chlamydophila pneumoniae, or Mycoplasma pneumoniae), consider antibiotic de-escalation to target atypical bacterial infection.   HCG, SERUM, QUALITATIVE - Normal   BNP (IN-HOUSE) - Normal    Narrative:     This assay is used as an aid in the diagnosis of individuals suspected of having heart failure. It can be used as an aid in the diagnosis of acute decompensated heart failure (ADHF) in patients presenting with signs and symptoms of ADHF to the emergency department (ED). In addition, NT-proBNP of <300 pg/mL indicates ADHF is not likely.    Age Range Result Interpretation  NT-proBNP Concentration (pg/mL:      <50             Positive            >450                   Gray                 300-450                    Negative             <300    50-75           Positive            >900                  Gray                300-900                  Negative            <300      >75             Positive            >1800                  Gray                300-1800                  Negative            <300   PROCALCITONIN - Normal    Narrative:     As a  "Marker for Sepsis (Non-Neonates):    1. <0.5 ng/mL represents a low risk of severe sepsis and/or septic shock.  2. >2 ng/mL represents a high risk of severe sepsis and/or septic shock.    As a Marker for Lower Respiratory Tract Infections that require antibiotic therapy:    PCT on Admission    Antibiotic Therapy       6-12 Hrs later    >0.5                Strongly Recommended  >0.25 - <0.5        Recommended   0.1 - 0.25          Discouraged              Remeasure/reassess PCT  <0.1                Strongly Discouraged     Remeasure/reassess PCT    As 28 day mortality risk marker: \"Change in Procalcitonin Result\" (>80% or <=80%) if Day 0 (or Day 1) and Day 4 values are available. Refer to http://www.[employer] CompaniesPost Acute Medical Rehabilitation [facility] Tul[facility] – TulsaDJZpct-calculator.com    Change in PCT <=80%  A decrease of PCT levels below or equal to 80% defines a positive change in PCT test result representing a higher risk for 28-day all-cause mortality of patients diagnosed with severe sepsis for septic shock.    Change in PCT >80%  A decrease of PCT levels of more than 80% defines a negative change in PCT result representing a lower risk for 28-day all-cause mortality of patients diagnosed with severe sepsis or septic shock.      POC LACTATE - Normal   BLOOD CULTURE   BLOOD CULTURE   CBC AND DIFFERENTIAL    Narrative:     The following orders were created for panel order CBC & Differential.  Procedure                               Abnormality         Status                     ---------                               -----------         ------                     CBC Auto Differential[[id number]]        Abnormal            Final result                 Please view results for these tests on the individual orders.     XR Chest 1 View    Result Date: 2/5/2025  Impression: 1. Multifocal nodular airspace disease overlying the right upper lobe and bilateral lower lobes appears improved from the prior study suggesting resolving bronchopneumonia. 2. No new consolidation. " Electronically Signed: Thai Oropeza MD  2/5/2025 12:01 PM EST  Workstation ID: [id number]   Medications   methylPREDNISolone sodium succinate (SOLU-Medrol) injection 125 mg (125 mg Intravenous Given 2/5/25 1242)   ipratropium-albuterol (DUO-NEB) nebulizer solution 3 mL (3 mL Nebulization Given 2/5/25 1208)                                                        Medical Decision Making  Patient is a 34-year-old female who presented with the above complaint.  She had the above evaluation and exam.    EKG independently interpreted by Dr. Argueta and reviewed by myself.  Sinus rhythm, rate 90, QT interval 370.  comparable to prior.    Imaging was independently interpreted by radiology and reviewed by myself.  X-ray of the chest showed resolving bronchopneumonia which appeared improved from prior study.    VBG showed fully compensated respiratory acidosis.  UA was negative with exception of 8.5 pH.  Pro-Poli and BNP were normal.  CMP was unremarkable.  Lactate, respiratory panel, hCG all normal.  White count 12.98.  No bands.    Discussed patient with Dr. Argueta.    Patient was given a DuoNeb and 125 Solu-Medrol in the ED at reassessment, patient is resting comfortably no acute distress.  She is maintaining normal SpO2 on room air.  Patient is afebrile and hemodynamically stable.  She did not  her medications that were prescribed to her when she was discharged from the hospital on February 2.  Patient was prescribed cefdinir and Medrol for home, and importance of picking this up with reiterated.  Patient verbalized agreement and understanding.    Problems Addressed:  Bronchopneumonia: complicated acute illness or injury  Dyspnea, unspecified type: complicated acute illness or injury    Amount and/or Complexity of Data Reviewed  Labs: ordered.  Radiology: ordered.    Risk  Prescription drug management.        Final diagnoses:   Dyspnea, unspecified type   Bronchopneumonia       ED Disposition  ED Disposition       ED  Disposition   Discharge    Condition   Stable    Comment   --               Lyle Hayward MD  [address] [facility] [address]  [place] [address] [phone]    Schedule an appointment as soon as possible for a visit  Call to make an appointment with Dr. Hayward.    Ellie Elizabeth, APRN  [address] [place] [address] [phone]    Schedule an appointment as soon as possible for a visit in 3 days           Medication List        New Prescriptions      methylPREDNISolone 4 MG dose pack  Commonly known as: MEDROL  Take as directed on package instructions.            Changed      hydrOXYzine 25 MG tablet  Commonly known as: ATARAX  Take 2 tablets by mouth Every 6 (Six) Hours As Needed for Anxiety.  What changed:   how much to take  when to take this               Where to Get Your Medications        [facility] [address] [place] [address]      Hours: Monday to Friday 7 AM to 7 PM Phone: [phone]   cefdinir 300 MG capsule  methylPREDNISolone 4 MG dose pack            Jenni Hassan PA-C  02/05/25 3232

## 2025-02-05 NOTE — DISCHARGE INSTRUCTIONS
Take medications as prescribed.    Follow-up with your PCP.    Return to the ER with new or worsening symptoms.    Quit smoking.

## 2025-02-05 NOTE — CASE MANAGEMENT/SOCIAL WORK
"Continued Stay Note  [facility]     Patient Name: Jacqueline Turcios  MRN: [id number]  Today's Date: 2/5/2025    Admit Date: 2/5/2025    Plan: Home with family   Discharge Plan       Row Name 02/05/25 1426       Plan    Plan Comments  spoke with patient prior to pt ambulating out of the ER.  Pt states she has not received a return phonecall from Dr. Hayward, but will call the office to make an appointment. She states she is feeling better, and glad to be going home. She states she will go to the pharmacy and  her medications. She denies discharge needs.      Row Name 02/05/25 3665       Plan    Plan Home with family    Plan Comments Pt reports she lives with family and is independent with all ADLs. She confirms she has nebulizer at home, but denies having other dme at home. She doesn't drive, and states mom or fiance will provide transportation at discharge. She confirms pcp and reports she has appointment with her on 2/14/25.Pt has not made follow-up appointment with pulmonolgist for follow-up, and was agreeable to  attempting to do so. I called Dr. Hayward's office, but had to leave a message on voicemail requesting pt or myself be called back to schedule a hospital follow-up appointment for patient. Pt informed she may be receiving a call from Dr. Hayward's office. Phone number for Dr. Hayward's office also placed on patient's AVS. Pt reports she was \"so ready to leave last time, I didn't really pay attention\" to the discharge instructions. Pt reports she was unaware she had new prescriptions,and did not  any new prescriptions from most recent discharge. She reports she is taking duonebs that were prescribed at discharge prior to that one. She is agreeable to meds to bed and this was updated in [facility]. She was informed that if she discharges from the ER, she will need to go to the [facility] in the [facility] to  new prescriptions if she has any. She verbalized " understanding. Pharmacy updated in [facility]. She denies any new dc needs. Currently on room air with O2 sat >90%.  ER Evaluation in progress. Disposition to be determined.               Dedra Feldman RN, [facility]  Office: [phone]  Fax: [phone]  [email or web address]      I met with patient in room wearing PPE: mask, eyewear  Maintained distance greater than six feet and spent </=15 minutes in the room    Dedra Feldman RN

## 2025-02-06 LAB
QT INTERVAL: 370 MS
QTC INTERVAL: 454 MS

## 2025-02-06 NOTE — OUTREACH NOTE
COPD/PN Week 1 Survey      Flowsheet Row Responses   [facility] from? [facility]   Does the patient have one of the following disease processes/diagnoses(primary or secondary)? Pneumonia   Week 1 attempt successful? No   Unsuccessful attempts Attempt 1  [Pt was in ED today]            Jeaneth LINTON - Licensed Nurse

## 2025-02-09 NOTE — PROGRESS NOTES
Enter Query Response Below      Query Response: Acute hypoxic respiratory failure ruled in              If applicable, please update the problem list.

## 2025-02-09 NOTE — PROGRESS NOTES
Enter Query Response Below      Query Response: Bacterial pneumonia              If applicable, please update the problem list.

## 2025-02-10 ENCOUNTER — READMISSION MANAGEMENT (OUTPATIENT)
Dept: CALL CENTER | Facility: HOSPITAL | Age: 35
End: 2025-02-10
Payer: MEDICAID

## 2025-02-10 LAB
BACTERIA SPEC AEROBE CULT: NORMAL
BACTERIA SPEC AEROBE CULT: NORMAL

## 2025-02-10 NOTE — OUTREACH NOTE
COPD/PN Week 1 Survey      Flowsheet Row Responses   [facility] patient discharged from? [facility]   Does the patient have one of the following disease processes/diagnoses(primary or secondary)? Pneumonia   Week 1 attempt successful? Yes   Call start time 1432   Call end time 1437   Discharge diagnosis Bronchopneumonia   Is patient permission given to speak with other caregiver? Yes   List who call center can speak with Mother - Jaymie   Person spoke with today (if not patient) and relationship Jaymie   Medication alerts for this patient Mother of pt reports that the pt did  the prescriptions for antibiotic and inhaler from [employer] reviewed with patient/caregiver? Yes   Is the patient having any side effects they believe may be caused by any medication additions or changes? No   Does the patient have all medications ordered at discharge? Yes   Is the patient taking all medications as directed (includes completed medication regime)? Yes   Does the patient have a primary care provider?  Yes   Comments regarding PCP Pt's Mother is unsure if pt has made f/u appt but will advise her to do so, she reports.   DME comments No home O2 in place per pt's Mother, pt has home nebulizer.   Comments Per pt's Mother, the pt is improving and has not complained of chest pain, fever or worsening SOA since her ER visit.   What is the patient's perception of their health status since discharge? Improving   Nursing Interventions Nurse provided patient education   Is the patient/caregiver able to teach back the hierarchy of who to call/visit for symptoms/problems? PCP, Specialist, Home health nurse, Urgent Care, ED, 911 Yes   Is the patient/caregiver able to teach back signs and symptoms of worsening condition: Shortness of breath, Fever/chills, Chest pain   Is the patient/caregiver able to teach back importance of completing antibiotic course of treatment? Yes   Week 1 call completed? Yes   Call end time 1437            Mattie  B - Registered Nurse

## 2025-02-19 ENCOUNTER — READMISSION MANAGEMENT (OUTPATIENT)
Dept: CALL CENTER | Facility: HOSPITAL | Age: 35
End: 2025-02-19
Payer: MEDICAID

## 2025-02-19 NOTE — OUTREACH NOTE
COPD/PN Week 2 Survey      Flowsheet Row Responses   [facility] patient discharged from? [facility]   Does the patient have one of the following disease processes/diagnoses(primary or secondary)? Pneumonia   Week 2 attempt successful? No   Unsuccessful attempts Attempt 1            Jacqueline  - Registered Nurse

## 2025-02-25 ENCOUNTER — READMISSION MANAGEMENT (OUTPATIENT)
Dept: CALL CENTER | Facility: HOSPITAL | Age: 35
End: 2025-02-25
Payer: MEDICAID

## 2025-02-25 NOTE — OUTREACH NOTE
COPD/PN Week 2 Survey      Flowsheet Row Responses   [facility] patient discharged from? [facility]   Does the patient have one of the following disease processes/diagnoses(primary or secondary)? Pneumonia   Week 2 attempt successful? Yes   Call start time 1806   Call end time 1807   Discharge diagnosis Bronchopneumonia   Meds reviewed with patient/caregiver? Yes   Is the patient having any side effects they believe may be caused by any medication additions or changes? No   Does the patient have all medications ordered at discharge? Yes   Is the patient taking all medications as directed (includes completed medication regime)? Yes   Medication comments completed steroids and antibiotics   Does the patient have a primary care provider?  Yes   Has the patient kept scheduled appointments due by today? Yes   Psychosocial issues? No   What is the patient's perception of their health status since discharge? Improving   Is the patient/caregiver able to teach back the hierarchy of who to call/visit for symptoms/problems? PCP, Specialist, Home health nurse, Urgent Care, ED, 911 Yes   Additional teach back comments States she had f/u with PCP and is clear in both lungs.   Week 2 call completed? Yes   Graduated Yes   Graduated/Revoked comments Brief call and pt states she is doing well.   Call end time 1807            Jeaneth LINTON - Licensed Nurse

## 2025-03-13 ENCOUNTER — APPOINTMENT (OUTPATIENT)
Dept: CT IMAGING | Facility: HOSPITAL | Age: 35
End: 2025-03-13
Payer: MEDICAID

## 2025-03-13 ENCOUNTER — HOSPITAL ENCOUNTER (OUTPATIENT)
Facility: HOSPITAL | Age: 35
Discharge: HOME | End: 2025-03-13
Attending: EMERGENCY MEDICINE | Admitting: EMERGENCY MEDICINE
Payer: MEDICAID

## 2025-03-13 ENCOUNTER — HOSPITAL ENCOUNTER (INPATIENT)
Facility: HOSPITAL | Age: 35
LOS: 1 days | Discharge: LEFT BEFORE DISCHARGE | End: 2025-03-14
Attending: INTERNAL MEDICINE | Admitting: EMERGENCY MEDICINE
Payer: MEDICAID

## 2025-03-13 VITALS
HEART RATE: 120 BPM | DIASTOLIC BLOOD PRESSURE: 87 MMHG | TEMPERATURE: 99.6 F | HEIGHT: 67 IN | WEIGHT: 293 LBS | OXYGEN SATURATION: 97 % | RESPIRATION RATE: 18 BRPM | BODY MASS INDEX: 45.99 KG/M2 | SYSTOLIC BLOOD PRESSURE: 160 MMHG

## 2025-03-13 DIAGNOSIS — L08.9 BACTERIAL SKIN INFECTION: ICD-10-CM

## 2025-03-13 DIAGNOSIS — B34.2 CORONAVIRUS INFECTION: ICD-10-CM

## 2025-03-13 DIAGNOSIS — F15.11 HISTORY OF METHAMPHETAMINE ABUSE: ICD-10-CM

## 2025-03-13 DIAGNOSIS — R82.5 POSITIVE URINE DRUG SCREEN: ICD-10-CM

## 2025-03-13 DIAGNOSIS — R09.02 HYPOXIA: Primary | ICD-10-CM

## 2025-03-13 DIAGNOSIS — J98.11 MILD BIBASILAR ATELECTASIS: ICD-10-CM

## 2025-03-13 DIAGNOSIS — G44.201 ACUTE INTRACTABLE TENSION-TYPE HEADACHE: Primary | ICD-10-CM

## 2025-03-13 DIAGNOSIS — B96.89 BACTERIAL SKIN INFECTION: ICD-10-CM

## 2025-03-13 DIAGNOSIS — R51.9 INTRACTABLE HEADACHE, UNSPECIFIED CHRONICITY PATTERN, UNSPECIFIED HEADACHE TYPE: ICD-10-CM

## 2025-03-13 LAB
ALBUMIN SERPL-MCNC: 3.7 G/DL (ref 3.5–5.2)
ALBUMIN/GLOB SERPL: 1.3 G/DL
ALP SERPL-CCNC: 113 U/L (ref 39–117)
ALT SERPL W P-5'-P-CCNC: 48 U/L (ref 1–33)
AMPHET+METHAMPHET UR QL: POSITIVE
AMPHETAMINES UR QL: POSITIVE
ANION GAP SERPL CALCULATED.3IONS-SCNC: 6 MMOL/L (ref 5–15)
APAP SERPL-MCNC: <5 MCG/ML (ref 0–30)
AST SERPL-CCNC: 39 U/L (ref 1–32)
B PARAPERT DNA SPEC QL NAA+PROBE: NOT DETECTED
B PERT DNA SPEC QL NAA+PROBE: NOT DETECTED
BARBITURATES UR QL SCN: NEGATIVE
BASOPHILS # BLD AUTO: 0.01 10*3/MM3 (ref 0–0.2)
BASOPHILS NFR BLD AUTO: 0.2 % (ref 0–1.5)
BENZODIAZ UR QL SCN: POSITIVE
BILIRUB SERPL-MCNC: 0.2 MG/DL (ref 0–1.2)
BILIRUB UR QL STRIP: NEGATIVE
BUN SERPL-MCNC: 5 MG/DL (ref 6–20)
BUN/CREAT SERPL: 7.8 (ref 7–25)
BUPRENORPHINE SERPL-MCNC: NEGATIVE NG/ML
C PNEUM DNA NPH QL NAA+NON-PROBE: NOT DETECTED
CALCIUM SPEC-SCNC: 8.8 MG/DL (ref 8.6–10.5)
CANNABINOIDS SERPL QL: NEGATIVE
CHLORIDE SERPL-SCNC: 101 MMOL/L (ref 98–107)
CLARITY UR: CLEAR
CO2 SERPL-SCNC: 30 MMOL/L (ref 22–29)
COCAINE UR QL: NEGATIVE
COLOR UR: YELLOW
CREAT SERPL-MCNC: 0.64 MG/DL (ref 0.57–1)
D DIMER PPP FEU-MCNC: 0.86 MCGFEU/ML (ref 0–0.5)
D-LACTATE SERPL-SCNC: 1 MMOL/L (ref 0.3–2)
DEPRECATED RDW RBC AUTO: 48.1 FL (ref 37–54)
EGFRCR SERPLBLD CKD-EPI 2021: 119.1 ML/MIN/1.73
EOSINOPHIL # BLD AUTO: 0.01 10*3/MM3 (ref 0–0.4)
EOSINOPHIL NFR BLD AUTO: 0.2 % (ref 0.3–6.2)
ERYTHROCYTE [DISTWIDTH] IN BLOOD BY AUTOMATED COUNT: 14.6 % (ref 12.3–15.4)
ETHANOL UR QL: <0.01 %
FLUAV SUBTYP SPEC NAA+PROBE: NOT DETECTED
FLUBV RNA ISLT QL NAA+PROBE: NOT DETECTED
GEN 5 1HR TROPONIN T REFLEX: 12 NG/L
GLOBULIN UR ELPH-MCNC: 2.9 GM/DL
GLUCOSE SERPL-MCNC: 112 MG/DL (ref 65–99)
GLUCOSE UR STRIP-MCNC: NEGATIVE MG/DL
HADV DNA SPEC NAA+PROBE: NOT DETECTED
HCG INTACT+B SERPL-ACNC: <1 MIU/ML
HCOV 229E RNA SPEC QL NAA+PROBE: NOT DETECTED
HCOV HKU1 RNA SPEC QL NAA+PROBE: DETECTED
HCOV NL63 RNA SPEC QL NAA+PROBE: NOT DETECTED
HCOV OC43 RNA SPEC QL NAA+PROBE: NOT DETECTED
HCT VFR BLD AUTO: 39.1 % (ref 34–46.6)
HGB BLD-MCNC: 11.6 G/DL (ref 12–15.9)
HGB UR QL STRIP.AUTO: NEGATIVE
HMPV RNA NPH QL NAA+NON-PROBE: NOT DETECTED
HOLD SPECIMEN: NORMAL
HOLD SPECIMEN: NORMAL
HPIV1 RNA ISLT QL NAA+PROBE: NOT DETECTED
HPIV2 RNA SPEC QL NAA+PROBE: NOT DETECTED
HPIV3 RNA NPH QL NAA+PROBE: NOT DETECTED
HPIV4 P GENE NPH QL NAA+PROBE: NOT DETECTED
IMM GRANULOCYTES # BLD AUTO: 0.06 10*3/MM3 (ref 0–0.05)
IMM GRANULOCYTES NFR BLD AUTO: 1 % (ref 0–0.5)
KETONES UR QL STRIP: NEGATIVE
LEUKOCYTE ESTERASE UR QL STRIP.AUTO: NEGATIVE
LYMPHOCYTES # BLD AUTO: 0.55 10*3/MM3 (ref 0.7–3.1)
LYMPHOCYTES NFR BLD AUTO: 9.1 % (ref 19.6–45.3)
M PNEUMO IGG SER IA-ACNC: NOT DETECTED
MCH RBC QN AUTO: 26.7 PG (ref 26.6–33)
MCHC RBC AUTO-ENTMCNC: 29.7 G/DL (ref 31.5–35.7)
MCV RBC AUTO: 89.9 FL (ref 79–97)
METHADONE UR QL SCN: POSITIVE
MONOCYTES # BLD AUTO: 0.55 10*3/MM3 (ref 0.1–0.9)
MONOCYTES NFR BLD AUTO: 9.1 % (ref 5–12)
NEUTROPHILS NFR BLD AUTO: 4.88 10*3/MM3 (ref 1.7–7)
NEUTROPHILS NFR BLD AUTO: 80.4 % (ref 42.7–76)
NITRITE UR QL STRIP: NEGATIVE
NRBC BLD AUTO-RTO: 0 /100 WBC (ref 0–0.2)
NT-PROBNP SERPL-MCNC: 557 PG/ML (ref 0–450)
OPIATES UR QL: NEGATIVE
OXYCODONE UR QL SCN: NEGATIVE
PCP UR QL SCN: NEGATIVE
PH UR STRIP.AUTO: 5.5 [PH] (ref 5–8)
PLATELET # BLD AUTO: 231 10*3/MM3 (ref 140–450)
PMV BLD AUTO: 9.4 FL (ref 6–12)
POTASSIUM SERPL-SCNC: 4.8 MMOL/L (ref 3.5–5.2)
PROT SERPL-MCNC: 6.6 G/DL (ref 6–8.5)
PROT UR QL STRIP: ABNORMAL
RBC # BLD AUTO: 4.35 10*6/MM3 (ref 3.77–5.28)
RHINOVIRUS RNA SPEC NAA+PROBE: NOT DETECTED
RSV RNA NPH QL NAA+NON-PROBE: NOT DETECTED
SALICYLATES SERPL-MCNC: 0.5 MG/DL
SARS-COV-2 RNA RESP QL NAA+PROBE: NOT DETECTED
SODIUM SERPL-SCNC: 137 MMOL/L (ref 136–145)
SP GR UR STRIP: 1.02 (ref 1–1.03)
TRICYCLICS UR QL SCN: NEGATIVE
TROPONIN T NUMERIC DELTA: 1 NG/L
TROPONIN T SERPL HS-MCNC: 11 NG/L
UROBILINOGEN UR QL STRIP: ABNORMAL
WBC NRBC COR # BLD AUTO: 6.06 10*3/MM3 (ref 3.4–10.8)
WHOLE BLOOD HOLD COAG: NORMAL
WHOLE BLOOD HOLD SPECIMEN: NORMAL

## 2025-03-13 PROCEDURE — 84484 ASSAY OF TROPONIN QUANT: CPT

## 2025-03-13 PROCEDURE — 83880 ASSAY OF NATRIURETIC PEPTIDE: CPT

## 2025-03-13 PROCEDURE — 71275 CT ANGIOGRAPHY CHEST: CPT

## 2025-03-13 PROCEDURE — 25010000002 DIPHENHYDRAMINE PER 50 MG: Performed by: EMERGENCY MEDICINE

## 2025-03-13 PROCEDURE — 25010000002 KETOROLAC TROMETHAMINE PER 15 MG

## 2025-03-13 PROCEDURE — 84702 CHORIONIC GONADOTROPIN TEST: CPT

## 2025-03-13 PROCEDURE — 87040 BLOOD CULTURE FOR BACTERIA: CPT

## 2025-03-13 PROCEDURE — 80179 DRUG ASSAY SALICYLATE: CPT

## 2025-03-13 PROCEDURE — 94799 UNLISTED PULMONARY SVC/PX: CPT

## 2025-03-13 PROCEDURE — 25010000002 PROCHLORPERAZINE 10 MG/2ML SOLUTION: Performed by: EMERGENCY MEDICINE

## 2025-03-13 PROCEDURE — 25010000002 AZTREONAM PER 500 MG

## 2025-03-13 PROCEDURE — 85025 COMPLETE CBC W/AUTO DIFF WBC: CPT

## 2025-03-13 PROCEDURE — 96365 THER/PROPH/DIAG IV INF INIT: CPT

## 2025-03-13 PROCEDURE — 80306 DRUG TEST PRSMV INSTRMNT: CPT

## 2025-03-13 PROCEDURE — 81003 URINALYSIS AUTO W/O SCOPE: CPT

## 2025-03-13 PROCEDURE — 96375 TX/PRO/DX INJ NEW DRUG ADDON: CPT

## 2025-03-13 PROCEDURE — 99291 CRITICAL CARE FIRST HOUR: CPT

## 2025-03-13 PROCEDURE — 80053 COMPREHEN METABOLIC PANEL: CPT

## 2025-03-13 PROCEDURE — 93005 ELECTROCARDIOGRAM TRACING: CPT

## 2025-03-13 PROCEDURE — 25010000002 VANCOMYCIN 2-0.9 GM/500ML-% SOLUTION

## 2025-03-13 PROCEDURE — 99213 OFFICE O/P EST LOW 20 MIN: CPT | Performed by: EMERGENCY MEDICINE

## 2025-03-13 PROCEDURE — 80143 DRUG ASSAY ACETAMINOPHEN: CPT

## 2025-03-13 PROCEDURE — 70450 CT HEAD/BRAIN W/O DYE: CPT

## 2025-03-13 PROCEDURE — 96367 TX/PROPH/DG ADDL SEQ IV INF: CPT

## 2025-03-13 PROCEDURE — 36415 COLL VENOUS BLD VENIPUNCTURE: CPT

## 2025-03-13 PROCEDURE — 25010000002 METRONIDAZOLE 500 MG/100ML SOLUTION

## 2025-03-13 PROCEDURE — 83605 ASSAY OF LACTIC ACID: CPT

## 2025-03-13 PROCEDURE — 82077 ASSAY SPEC XCP UR&BREATH IA: CPT

## 2025-03-13 PROCEDURE — G0463 HOSPITAL OUTPT CLINIC VISIT: HCPCS | Performed by: EMERGENCY MEDICINE

## 2025-03-13 PROCEDURE — 0202U NFCT DS 22 TRGT SARS-COV-2: CPT

## 2025-03-13 PROCEDURE — 25010000002 DEXAMETHASONE PER 1 MG

## 2025-03-13 PROCEDURE — 25510000001 IOPAMIDOL PER 1 ML

## 2025-03-13 PROCEDURE — 85379 FIBRIN DEGRADATION QUANT: CPT

## 2025-03-13 RX ORDER — DEXAMETHASONE SODIUM PHOSPHATE 4 MG/ML
10 INJECTION, SOLUTION INTRA-ARTICULAR; INTRALESIONAL; INTRAMUSCULAR; INTRAVENOUS; SOFT TISSUE ONCE
Status: COMPLETED | OUTPATIENT
Start: 2025-03-13 | End: 2025-03-13

## 2025-03-13 RX ORDER — PROMETHAZINE HYDROCHLORIDE 25 MG/1
25 TABLET ORAL EVERY 6 HOURS PRN
Qty: 20 TABLET | Refills: 0 | Status: SHIPPED | OUTPATIENT
Start: 2025-03-13

## 2025-03-13 RX ORDER — DIPHENHYDRAMINE HYDROCHLORIDE 50 MG/ML
50 INJECTION, SOLUTION INTRAMUSCULAR; INTRAVENOUS ONCE
Status: COMPLETED | OUTPATIENT
Start: 2025-03-13 | End: 2025-03-13

## 2025-03-13 RX ORDER — PROCHLORPERAZINE EDISYLATE 5 MG/ML
5 INJECTION INTRAMUSCULAR; INTRAVENOUS ONCE
Status: DISCONTINUED | OUTPATIENT
Start: 2025-03-13 | End: 2025-03-13

## 2025-03-13 RX ORDER — VANCOMYCIN 2 GRAM/500 ML IN 0.9 % SODIUM CHLORIDE INTRAVENOUS
2000 ONCE
Status: COMPLETED | OUTPATIENT
Start: 2025-03-13 | End: 2025-03-14

## 2025-03-13 RX ORDER — DIPHENHYDRAMINE HYDROCHLORIDE 50 MG/ML
25 INJECTION, SOLUTION INTRAMUSCULAR; INTRAVENOUS ONCE
Status: DISCONTINUED | OUTPATIENT
Start: 2025-03-13 | End: 2025-03-13

## 2025-03-13 RX ORDER — PROCHLORPERAZINE EDISYLATE 5 MG/ML
10 INJECTION INTRAMUSCULAR; INTRAVENOUS ONCE
Status: DISCONTINUED | OUTPATIENT
Start: 2025-03-13 | End: 2025-03-13

## 2025-03-13 RX ORDER — METRONIDAZOLE 500 MG/100ML
500 INJECTION, SOLUTION INTRAVENOUS ONCE
Status: COMPLETED | OUTPATIENT
Start: 2025-03-13 | End: 2025-03-13

## 2025-03-13 RX ORDER — PROCHLORPERAZINE EDISYLATE 5 MG/ML
10 INJECTION INTRAMUSCULAR; INTRAVENOUS ONCE
Status: COMPLETED | OUTPATIENT
Start: 2025-03-13 | End: 2025-03-13

## 2025-03-13 RX ORDER — IOPAMIDOL 755 MG/ML
100 INJECTION, SOLUTION INTRAVASCULAR
Status: COMPLETED | OUTPATIENT
Start: 2025-03-14 | End: 2025-03-13

## 2025-03-13 RX ORDER — KETOROLAC TROMETHAMINE 30 MG/ML
15 INJECTION, SOLUTION INTRAMUSCULAR; INTRAVENOUS ONCE
Status: COMPLETED | OUTPATIENT
Start: 2025-03-13 | End: 2025-03-13

## 2025-03-13 RX ORDER — SODIUM CHLORIDE 0.9 % (FLUSH) 0.9 %
10 SYRINGE (ML) INJECTION AS NEEDED
Status: DISCONTINUED | OUTPATIENT
Start: 2025-03-13 | End: 2025-03-14 | Stop reason: HOSPADM

## 2025-03-13 RX ADMIN — PROCHLORPERAZINE EDISYLATE 10 MG: 5 INJECTION INTRAMUSCULAR; INTRAVENOUS at 16:45

## 2025-03-13 RX ADMIN — Medication 2000 MG: at 23:14

## 2025-03-13 RX ADMIN — METRONIDAZOLE 500 MG: 500 INJECTION, SOLUTION INTRAVENOUS at 23:14

## 2025-03-13 RX ADMIN — DEXAMETHASONE SODIUM PHOSPHATE 10 MG: 4 INJECTION, SOLUTION INTRAMUSCULAR; INTRAVENOUS at 22:07

## 2025-03-13 RX ADMIN — AZTREONAM 2 G: 2 INJECTION, POWDER, LYOPHILIZED, FOR SOLUTION INTRAMUSCULAR; INTRAVENOUS at 22:50

## 2025-03-13 RX ADMIN — IOPAMIDOL 100 ML: 755 INJECTION, SOLUTION INTRAVENOUS at 23:44

## 2025-03-13 RX ADMIN — DIPHENHYDRAMINE HYDROCHLORIDE 50 MG: 50 INJECTION, SOLUTION INTRAMUSCULAR; INTRAVENOUS at 16:44

## 2025-03-13 RX ADMIN — KETOROLAC TROMETHAMINE 15 MG: 30 INJECTION, SOLUTION INTRAMUSCULAR at 22:05

## 2025-03-13 NOTE — FSED PROVIDER NOTE
Subjective   History of Present Illness  Pt reports waking up with frontal headache and migraine type pain she can't get any relief from, little help from otc excedrin, recently did iv meth 4-5 days ago but denies any other drugs outside of smoking, nausea without v/d/ or abd pain, no cough/uri/fevers, no cp/soa/, no fall or trauma, no systemic symptoms    History provided by:  Patient   used: No        Review of Systems   Constitutional: Negative.    Respiratory:  Negative for apnea.    Cardiovascular:  Negative for chest pain.   Neurological:  Positive for headaches.   All other systems reviewed and are negative.      Past Medical History:   Diagnosis Date    Asthma     Kidney stone        Allergies   Allergen Reactions    Penicillins Hives     Beta lactam allergy details  Antibiotic reaction: other (throat closed)  Age at reaction: infant  Dose to reaction time: (!) hours  Reason for antibiotic: unknown  Epinephrine required for reaction?: unknown  Tolerated antibiotics: amoxicillin, cephalexin, cefepime           Past Surgical History:   Procedure Laterality Date     SECTION      CHOLECYSTECTOMY      DILATION AND CURETTAGE, DIAGNOSTIC / THERAPEUTIC      EXCISION LESION N/A 2024    Procedure: EXCISION of cyst from psterior neck, prone position;  Surgeon: Raul Lawson MD;  Location: [facility] MAIN OR;  Service: General;  Laterality: N/A;    URETERAL STENT INSERTION         No family history on file.    Social History     Socioeconomic History    Marital status: Single   Tobacco Use    Smoking status: Every Day     Current packs/day: 1.00     Average packs/day: 1 pack/day for 12.2 years (12.2 ttl pk-yrs)     Types: Cigarettes     Start date:      Passive exposure: Current    Smokeless tobacco: Never   Vaping Use    Vaping status: Never Used   Substance and Sexual Activity    Alcohol use: No    Drug use: Never    Sexual activity: Defer           Objective   Physical  Exam  Vitals and nursing note reviewed.   HENT:      Head: Normocephalic.      Nose: Nose normal.      Mouth/Throat:      Mouth: Mucous membranes are moist.   Eyes:      Conjunctiva/sclera: Conjunctivae normal.   Cardiovascular:      Rate and Rhythm: Normal rate.   Pulmonary:      Effort: Pulmonary effort is normal.   Musculoskeletal:         General: Normal range of motion.      Cervical back: Normal range of motion.   Skin:     General: Skin is warm.      Capillary Refill: Capillary refill takes less than 2 seconds.   Neurological:      General: No focal deficit present.      Mental Status: She is alert.   Psychiatric:         Mood and Affect: Mood normal.         Procedures           ED Course                                           Medical Decision Making  Pt feeling better, billy po and states moody gone  No si/hi  Hr 90s  RTER d.    Risk  Prescription drug management.        Final diagnoses:   Acute intractable tension-type headache       ED Disposition  ED Disposition       ED Disposition   Discharge    Condition   Stable    Comment   --               Ellie Elizabeth, APRN  [address] [place] [address] [phone]    In 3 days  If symptoms worsen         Medication List        New Prescriptions      promethazine 25 MG tablet  Commonly known as: PHENERGAN  Take 1 tablet by mouth Every 6 (Six) Hours As Needed for Nausea or Vomiting.            Changed      hydrOXYzine 25 MG tablet  Commonly known as: ATARAX  Take 2 tablets by mouth Every 6 (Six) Hours As Needed for Anxiety.  What changed:   how much to take  when to take this               Where to Get Your Medications        These medications were sent to [id number] - [place], [place] - [address] - [phone]  - [phone] FX  [address] [place] [address]      Phone: [phone]   promethazine 25 MG tablet

## 2025-03-13 NOTE — Clinical Note
Level of Care: Critical Care [6]   Admitting Physician: BERNARD ROBLERO [[id number]]   Attending Physician: BERNARD ROBLERO [[id number]]

## 2025-03-14 VITALS
SYSTOLIC BLOOD PRESSURE: 122 MMHG | HEIGHT: 67 IN | OXYGEN SATURATION: 94 % | HEART RATE: 83 BPM | TEMPERATURE: 97.4 F | RESPIRATION RATE: 21 BRPM | DIASTOLIC BLOOD PRESSURE: 77 MMHG | BODY MASS INDEX: 45.99 KG/M2 | WEIGHT: 293 LBS

## 2025-03-14 PROBLEM — F32.A DEPRESSION: Status: ACTIVE | Noted: 2025-03-14

## 2025-03-14 PROBLEM — F41.0 PANIC ATTACK: Status: ACTIVE | Noted: 2024-11-25

## 2025-03-14 PROBLEM — E78.5 HYPERLIPIDEMIA: Status: ACTIVE | Noted: 2024-11-22

## 2025-03-14 PROBLEM — B18.2 CARRIER OF VIRAL HEPATITIS C: Status: ACTIVE | Noted: 2024-11-25

## 2025-03-14 PROBLEM — L08.9 BACTERIAL SKIN INFECTION: Status: ACTIVE | Noted: 2025-03-14

## 2025-03-14 PROBLEM — F11.93 OPIOID WITHDRAWAL: Status: ACTIVE | Noted: 2017-05-22

## 2025-03-14 PROBLEM — M54.6 THORACIC BACK PAIN: Status: ACTIVE | Noted: 2025-01-17

## 2025-03-14 PROBLEM — J96.01 ACUTE RESPIRATORY FAILURE WITH HYPOXIA: Status: ACTIVE | Noted: 2025-03-14

## 2025-03-14 PROBLEM — R53.83 FATIGUE: Status: ACTIVE | Noted: 2024-11-22

## 2025-03-14 PROBLEM — Z87.19 H/O CHRONIC HEPATITIS: Status: ACTIVE | Noted: 2022-09-09

## 2025-03-14 PROBLEM — F41.1 GENERALIZED ANXIETY DISORDER: Status: ACTIVE | Noted: 2024-11-25

## 2025-03-14 PROBLEM — L03.90 CELLULITIS: Status: ACTIVE | Noted: 2017-10-23

## 2025-03-14 PROBLEM — F11.20 METHADONE MAINTENANCE TREATMENT AFFECTING PREGNANCY, ANTEPARTUM: Status: ACTIVE | Noted: 2022-09-09

## 2025-03-14 PROBLEM — R73.9 HYPERGLYCEMIA: Status: ACTIVE | Noted: 2024-11-22

## 2025-03-14 PROBLEM — M54.16 LUMBAR RADICULOPATHY: Status: ACTIVE | Noted: 2025-01-17

## 2025-03-14 PROBLEM — A64 STD (FEMALE): Status: ACTIVE | Noted: 2018-07-24

## 2025-03-14 PROBLEM — O34.219 H/O CESAREAN SECTION COMPLICATING PREGNANCY: Status: ACTIVE | Noted: 2022-09-09

## 2025-03-14 PROBLEM — E55.9 VITAMIN D DEFICIENCY: Status: ACTIVE | Noted: 2024-11-22

## 2025-03-14 PROBLEM — O99.320 METHADONE MAINTENANCE TREATMENT AFFECTING PREGNANCY, ANTEPARTUM: Status: ACTIVE | Noted: 2022-09-09

## 2025-03-14 PROBLEM — E87.1 HYPONATREMIA: Status: ACTIVE | Noted: 2024-12-06

## 2025-03-14 PROBLEM — F33.2 SEVERE RECURRENT MAJOR DEPRESSION WITHOUT PSYCHOTIC FEATURES: Status: ACTIVE | Noted: 2024-11-25

## 2025-03-14 PROBLEM — B19.20 HEPATITIS C: Status: ACTIVE | Noted: 2025-03-14

## 2025-03-14 PROBLEM — R74.8 ELEVATED LIVER ENZYMES: Status: ACTIVE | Noted: 2024-12-06

## 2025-03-14 PROBLEM — B96.89 BACTERIAL SKIN INFECTION: Status: ACTIVE | Noted: 2025-03-14

## 2025-03-14 PROBLEM — E66.9 OBESITY: Status: ACTIVE | Noted: 2024-11-25

## 2025-03-14 LAB
ARTERIAL PATENCY WRIST A: POSITIVE
ARTERIAL PATENCY WRIST A: POSITIVE
ATMOSPHERIC PRESS: ABNORMAL MM[HG]
ATMOSPHERIC PRESS: ABNORMAL MM[HG]
BASE EXCESS BLDA CALC-SCNC: 1.9 MMOL/L (ref 0–3)
BASE EXCESS BLDA CALC-SCNC: 3.2 MMOL/L (ref 0–3)
BDY SITE: ABNORMAL
BDY SITE: ABNORMAL
CO2 BLDA-SCNC: 32.8 MMOL/L (ref 22–29)
CO2 BLDA-SCNC: 33.3 MMOL/L (ref 22–29)
HCO3 BLDA-SCNC: 30.9 MMOL/L (ref 21–28)
HCO3 BLDA-SCNC: 31.1 MMOL/L (ref 21–28)
HEMODILUTION: NO
HEMODILUTION: NO
INHALED O2 CONCENTRATION: 80 %
INHALED O2 CONCENTRATION: 80 %
INSPIRATORY TIME: 1
Lab: ABNORMAL
MODALITY: ABNORMAL
MODALITY: ABNORMAL
NOTIFIED WHO: ABNORMAL
PCO2 BLDA: 61 MM HG (ref 35–48)
PCO2 BLDA: 71.8 MM HG (ref 35–48)
PH BLDA: 7.25 PH UNITS (ref 7.35–7.45)
PH BLDA: 7.31 PH UNITS (ref 7.35–7.45)
PO2 BLD: 104 MM[HG] (ref 0–500)
PO2 BLD: 205 MM[HG] (ref 0–500)
PO2 BLDA: 163.8 MM HG (ref 83–108)
PO2 BLDA: 83.3 MM HG (ref 83–108)
QT INTERVAL: 332 MS
QTC INTERVAL: 429 MS
READ BACK: ABNORMAL
RESPIRATORY RATE: 24
SAO2 % BLDCOA: 93.5 % (ref 94–98)
SAO2 % BLDCOA: 99.3 % (ref 94–98)
VENTILATOR MODE: ABNORMAL
VT ON VENT VENT: 500 ML

## 2025-03-14 PROCEDURE — 25010000002 LORAZEPAM PER 2 MG

## 2025-03-14 PROCEDURE — 82803 BLOOD GASES ANY COMBINATION: CPT

## 2025-03-14 PROCEDURE — 36600 WITHDRAWAL OF ARTERIAL BLOOD: CPT

## 2025-03-14 PROCEDURE — 94660 CPAP INITIATION&MGMT: CPT

## 2025-03-14 PROCEDURE — 94799 UNLISTED PULMONARY SVC/PX: CPT

## 2025-03-14 PROCEDURE — 96375 TX/PRO/DX INJ NEW DRUG ADDON: CPT

## 2025-03-14 RX ORDER — SODIUM CHLORIDE 0.9 % (FLUSH) 0.9 %
10 SYRINGE (ML) INJECTION AS NEEDED
Status: DISCONTINUED | OUTPATIENT
Start: 2025-03-14 | End: 2025-03-14 | Stop reason: HOSPADM

## 2025-03-14 RX ORDER — ONDANSETRON 4 MG/1
4 TABLET, ORALLY DISINTEGRATING ORAL EVERY 6 HOURS PRN
Status: DISCONTINUED | OUTPATIENT
Start: 2025-03-14 | End: 2025-03-14 | Stop reason: HOSPADM

## 2025-03-14 RX ORDER — BISACODYL 5 MG/1
5 TABLET, DELAYED RELEASE ORAL DAILY PRN
Status: DISCONTINUED | OUTPATIENT
Start: 2025-03-14 | End: 2025-03-14 | Stop reason: HOSPADM

## 2025-03-14 RX ORDER — ONDANSETRON 2 MG/ML
4 INJECTION INTRAMUSCULAR; INTRAVENOUS EVERY 6 HOURS PRN
Status: DISCONTINUED | OUTPATIENT
Start: 2025-03-14 | End: 2025-03-14 | Stop reason: HOSPADM

## 2025-03-14 RX ORDER — ALUMINA, MAGNESIA, AND SIMETHICONE 2400; 2400; 240 MG/30ML; MG/30ML; MG/30ML
15 SUSPENSION ORAL EVERY 6 HOURS PRN
Status: DISCONTINUED | OUTPATIENT
Start: 2025-03-14 | End: 2025-03-14 | Stop reason: HOSPADM

## 2025-03-14 RX ORDER — ACETAMINOPHEN 325 MG/1
650 TABLET ORAL EVERY 4 HOURS PRN
Status: DISCONTINUED | OUTPATIENT
Start: 2025-03-14 | End: 2025-03-14 | Stop reason: HOSPADM

## 2025-03-14 RX ORDER — HYDROXYZINE HYDROCHLORIDE 25 MG/1
25 TABLET, FILM COATED ORAL 3 TIMES DAILY PRN
COMMUNITY

## 2025-03-14 RX ORDER — SODIUM CHLORIDE 0.9 % (FLUSH) 0.9 %
10 SYRINGE (ML) INJECTION EVERY 12 HOURS SCHEDULED
Status: DISCONTINUED | OUTPATIENT
Start: 2025-03-14 | End: 2025-03-14 | Stop reason: HOSPADM

## 2025-03-14 RX ORDER — SODIUM CHLORIDE 9 MG/ML
40 INJECTION, SOLUTION INTRAVENOUS AS NEEDED
Status: DISCONTINUED | OUTPATIENT
Start: 2025-03-14 | End: 2025-03-14 | Stop reason: HOSPADM

## 2025-03-14 RX ORDER — BISACODYL 10 MG
10 SUPPOSITORY, RECTAL RECTAL DAILY PRN
Status: DISCONTINUED | OUTPATIENT
Start: 2025-03-14 | End: 2025-03-14 | Stop reason: HOSPADM

## 2025-03-14 RX ORDER — POLYETHYLENE GLYCOL 3350 17 G/17G
17 POWDER, FOR SOLUTION ORAL DAILY PRN
Status: DISCONTINUED | OUTPATIENT
Start: 2025-03-14 | End: 2025-03-14 | Stop reason: HOSPADM

## 2025-03-14 RX ORDER — LORAZEPAM 2 MG/ML
1 INJECTION INTRAMUSCULAR ONCE
Status: COMPLETED | OUTPATIENT
Start: 2025-03-14 | End: 2025-03-14

## 2025-03-14 RX ORDER — IPRATROPIUM BROMIDE AND ALBUTEROL SULFATE 2.5; .5 MG/3ML; MG/3ML
3 SOLUTION RESPIRATORY (INHALATION) EVERY 6 HOURS PRN
Status: DISCONTINUED | OUTPATIENT
Start: 2025-03-14 | End: 2025-03-14 | Stop reason: HOSPADM

## 2025-03-14 RX ORDER — NITROGLYCERIN 0.4 MG/1
0.4 TABLET SUBLINGUAL
Status: DISCONTINUED | OUTPATIENT
Start: 2025-03-14 | End: 2025-03-14 | Stop reason: HOSPADM

## 2025-03-14 RX ORDER — ACETAMINOPHEN 650 MG/1
650 SUPPOSITORY RECTAL EVERY 4 HOURS PRN
Status: DISCONTINUED | OUTPATIENT
Start: 2025-03-14 | End: 2025-03-14 | Stop reason: HOSPADM

## 2025-03-14 RX ORDER — AMOXICILLIN 250 MG
2 CAPSULE ORAL 2 TIMES DAILY PRN
Status: DISCONTINUED | OUTPATIENT
Start: 2025-03-14 | End: 2025-03-14 | Stop reason: HOSPADM

## 2025-03-14 RX ADMIN — Medication 10 ML: at 01:11

## 2025-03-14 RX ADMIN — LORAZEPAM 1 MG: 2 INJECTION INTRAMUSCULAR; INTRAVENOUS at 01:11

## 2025-03-14 NOTE — ED PROVIDER NOTES
Subjective   Chief Complaint   Patient presents with    Headache       History of Present Illness  Patient reports that she was seen at the [facility] ED for a headache earlier today and was given unknown medications however reports that she is still having a headache as well as dizziness nausea and light sensitivity has a history of intermittent migraines.  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  She reports multiple failed injections on her arms and legs that are causing her pain and discomfort.  On her arrival she was 61% on room air and was immediately placed in a room on a nonrebreather.  Review of Systems   Constitutional:  Positive for diaphoresis, fatigue and fever.   Respiratory:  Positive for cough and shortness of breath.    Cardiovascular:  Positive for chest pain.   Musculoskeletal:  Positive for myalgias.   Skin:  Positive for color change and wound.   All other systems reviewed and are negative.      Past Medical History:   Diagnosis Date    Asthma     Kidney stone        Allergies   Allergen Reactions    Penicillins Hives     Beta lactam allergy details  Antibiotic reaction: other (throat closed)  Age at reaction: infant  Dose to reaction time: (!) hours  Reason for antibiotic: unknown  Epinephrine required for reaction?: unknown  Tolerated antibiotics: amoxicillin, cephalexin, cefepime           Past Surgical History:   Procedure Laterality Date     SECTION      CHOLECYSTECTOMY      DILATION AND CURETTAGE, DIAGNOSTIC / THERAPEUTIC      EXCISION LESION N/A 2024    Procedure: EXCISION of cyst from psterior neck, prone position;  Surgeon: Raul Lawson MD;  Location: [facility] MAIN OR;  Service: General;  Laterality: N/A;    URETERAL STENT INSERTION         History reviewed. No pertinent family history.    Social History     Socioeconomic History    Marital status: Single   Tobacco Use    Smoking status:  Every Day     Current packs/day: 1.00     Average packs/day: 1 pack/day for 12.2 years (12.2 ttl pk-yrs)     Types: Cigarettes     Start date: 2013     Passive exposure: Current    Smokeless tobacco: Never   Vaping Use    Vaping status: Never Used   Substance and Sexual Activity    Alcohol use: No    Drug use: Never    Sexual activity: Defer           Objective   Physical Exam  Vitals and nursing note reviewed.   Constitutional:       General: She is in acute distress.      Appearance: Normal appearance. She is obese. She is ill-appearing and diaphoretic. She is not toxic-appearing.   HENT:      Head: Normocephalic and atraumatic.      Right Ear: Tympanic membrane, ear canal and external ear normal.      Left Ear: Tympanic membrane, ear canal and external ear normal.      Nose: Nose normal.      Mouth/Throat:      Mouth: Mucous membranes are moist.      Pharynx: Oropharynx is clear.   Eyes:      Extraocular Movements: Extraocular movements intact.      Conjunctiva/sclera: Conjunctivae normal.      Pupils: Pupils are equal, round, and reactive to light.   Cardiovascular:      Rate and Rhythm: Normal rate and regular rhythm.      Pulses: Normal pulses.      Heart sounds: Normal heart sounds.   Pulmonary:      Effort: Respiratory distress present.      Breath sounds: Wheezing present.   Abdominal:      General: Bowel sounds are normal.      Palpations: Abdomen is soft.   Musculoskeletal:         General: Normal range of motion.      Cervical back: Normal range of motion and neck supple.   Skin:     General: Skin is warm and dry.      Capillary Refill: Capillary refill takes less than 2 seconds.      Findings: Erythema and lesion present.   Neurological:      General: No focal deficit present.      Mental Status: She is alert.   Psychiatric:         Mood and Affect: Mood normal.         Behavior: Behavior normal.         Thought Content: Thought content normal.         Judgment: Judgment normal.         Procedures      "      ED Course        /77   Pulse 83   Temp 97.4 °F (36.3 °C) (Axillary)   Resp 21   Ht 170.2 cm (67.01\")   Wt (!) 156 kg (343 lb 14.7 oz)   LMP 02/15/2025 (Approximate)   SpO2 94%   BMI 53.85 kg/m²   Labs Reviewed   RESPIRATORY PANEL PCR W/ COVID-19 (SARS-COV-2), NP SWAB IN UTM/VTP, 2 HR TAT - Abnormal; Notable for the following components:       Result Value    Coronavirus HKU1 Detected (*)     All other components within normal limits    Narrative:     In the setting of a positive respiratory panel with a viral infection PLUS a negative procalcitonin without other underlying concern for bacterial infection, consider observing off antibiotics or discontinuation of antibiotics and continue supportive care. If the respiratory panel is positive for atypical bacterial infection (Bordetella pertussis, Chlamydophila pneumoniae, or Mycoplasma pneumoniae), consider antibiotic de-escalation to target atypical bacterial infection.   COMPREHENSIVE METABOLIC PANEL - Abnormal; Notable for the following components:    Glucose 112 (*)     BUN 5 (*)     CO2 30.0 (*)     ALT (SGPT) 48 (*)     AST (SGOT) 39 (*)     All other components within normal limits    Narrative:     GFR Categories in Chronic Kidney Disease (CKD)      GFR Category          GFR (mL/min/1.73)    Interpretation  G1                     90 or greater         Normal or high (1)  G2                      60-89                Mild decrease (1)  G3a                   45-59                Mild to moderate decrease  G3b                   30-44                Moderate to severe decrease  G4                    15-29                Severe decrease  G5                    14 or less           Kidney failure          (1)In the absence of evidence of kidney disease, neither GFR category G1 or G2 fulfill the criteria for CKD.    eGFR calculation 2021 CKD-EPI creatinine equation, which does not include race as a factor   URINALYSIS W/ MICROSCOPIC IF INDICATED (NO " CULTURE) - Abnormal; Notable for the following components:    Protein, UA Trace (*)     All other components within normal limits    Narrative:     Urine microscopic not indicated.   BNP (IN-HOUSE) - Abnormal; Notable for the following components:    proBNP 557.0 (*)     All other components within normal limits    Narrative:     This assay is used as an aid in the diagnosis of individuals suspected of having heart failure. It can be used as an aid in the diagnosis of acute decompensated heart failure (ADHF) in patients presenting with signs and symptoms of ADHF to the emergency department (ED). In addition, NT-proBNP of <300 pg/mL indicates ADHF is not likely.    Age Range Result Interpretation  NT-proBNP Concentration (pg/mL:      <50             Positive            >450                   Gray                 300-450                    Negative             <300    50-75           Positive            >900                  Gray                300-900                  Negative            <300      >75             Positive            >1800                  Gray                300-1800                  Negative            <300   D-DIMER, QUANTITATIVE - Abnormal; Notable for the following components:    D-Dimer, Quantitative 0.86 (*)     All other components within normal limits    Narrative:     According to the assay 's published package insert, a normal (<0.50 MCGFEU/mL) D-dimer result in conjunction with a non-high clinical probability assessment, excludes deep vein thrombosis (DVT) and pulmonary embolism (PE) with high sensitivity.    D-dimer values increase with age and this can make VTE exclusion of an older population difficult. To address this, the [employer], based on best available evidence and recent guidelines, recommends that clinicians use age-adjusted D-dimer thresholds in patients greater than 50 years of age with: a) a low probability of PE who do not meet all Pulmonary  "Embolism Rule Out Criteria, or b) in those with intermediate probability of PE.   The formula for an age-adjusted D-dimer cut-off is \"age/100\".  For example, a 60 year old patient would have an age-adjusted cut-off of 0.60 MCGFEU/mL and an 80 year old 0.80 MCGFEU/mL.   CBC WITH AUTO DIFFERENTIAL - Abnormal; Notable for the following components:    Hemoglobin 11.6 (*)     MCHC 29.7 (*)     Neutrophil % 80.4 (*)     Lymphocyte % 9.1 (*)     Eosinophil % 0.2 (*)     Immature Grans % 1.0 (*)     Lymphocytes, Absolute 0.55 (*)     Immature Grans, Absolute 0.06 (*)     All other components within normal limits   URINE DRUG SCREEN - Abnormal; Notable for the following components:    Methamphetamine, Ur Positive (*)     Amphetamine Screen, Urine Positive (*)     Benzodiazepine Screen, Urine Positive (*)     Methadone Screen, Urine Positive (*)     All other components within normal limits    Narrative:     Cutoff For Drugs Screened:    Amphetamines               500 ng/ml  Barbiturates               200 ng/ml  Benzodiazepines            150 ng/ml  Cocaine                    150 ng/ml  Methadone                  200 ng/ml  Opiates                    100 ng/ml  Phencyclidine               25 ng/ml  THC                         50 ng/ml  Methamphetamine            500 ng/ml  Tricyclic Antidepressants  300 ng/ml  Oxycodone                  100 ng/ml  Buprenorphine               10 ng/ml    The normal value for all drugs tested is negative. This report includes unconfirmed screening results, with the cutoff values listed, to be used for medical treatment purposes only.  Unconfirmed results must not be used for non-medical purposes such as employment or legal testing.  Clinical consideration should be applied to any drug of abuse test, particularly when unconfirmed results are used.    All urine drugs of abuse requests without chain of custody are for medical screening purposes only.  False positives are possible.     BLOOD GAS, " ARTERIAL - Abnormal; Notable for the following components:    pH, Arterial 7.245 (*)     pCO2, Arterial 71.8 (*)     HCO3, Arterial 31.1 (*)     O2 Saturation, Arterial 93.5 (*)     CO2 Content 33.3 (*)     All other components within normal limits   BLOOD GAS, ARTERIAL - Abnormal; Notable for the following components:    pH, Arterial 7.313 (*)     pCO2, Arterial 61.0 (*)     pO2, Arterial 163.8 (*)     HCO3, Arterial 30.9 (*)     Base Excess, Arterial 3.2 (*)     O2 Saturation, Arterial 99.3 (*)     CO2 Content 32.8 (*)     All other components within normal limits   TROPONIN - Normal    Narrative:     High Sensitive Troponin T Reference Range:  <14.0 ng/L- Negative Female for AMI  <22.0 ng/L- Negative Male for AMI  >=14 - Abnormal Female indicating possible myocardial injury.  >=22 - Abnormal Male indicating possible myocardial injury.   Clinicians would have to utilize clinical acumen, EKG, Troponin, and serial changes to determine if it is an Acute Myocardial Infarction or myocardial injury due to an underlying chronic condition.        ACETAMINOPHEN LEVEL - Normal    Narrative:     Acetaminophen Therapeutic Range  5-20 ug/mL      Hours after ingestion            Toxic Value    4 Hours                           150 ug/mL    8 Hours                            70 ug/mL   12 Hours                            40 ug/mL   16 Hours                            20 ug/mL    These values apply to a single ingestion only.    SALICYLATE LEVEL - Normal   HIGH SENSITIVITIY TROPONIN T 1HR - Normal    Narrative:     High Sensitive Troponin T Reference Range:  <14.0 ng/L- Negative Female for AMI  <22.0 ng/L- Negative Male for AMI  >=14 - Abnormal Female indicating possible myocardial injury.  >=22 - Abnormal Male indicating possible myocardial injury.   Clinicians would have to utilize clinical acumen, EKG, Troponin, and serial changes to determine if it is an Acute Myocardial Infarction or myocardial injury due to an underlying  chronic condition.        POC LACTATE - Normal   BLOOD CULTURE   BLOOD CULTURE   MRSA SCREEN, PCR   HCG, QUANTITATIVE, PREGNANCY    Narrative:     HCG Ranges by Gestational Age    Females - non-pregnant premenopausal   </= 1mIU/mL HCG  Females - postmenopausal               </= 7mIU/mL HCG    3 Weeks       5.4   -      72 mIU/mL  4 Weeks      10.2   -     708 mIU/mL  5 Weeks       217   -   8,245 mIU/mL  6 Weeks       152   -  32,177 mIU/mL  7 Weeks     4,059   - 153,767 mIU/mL  8 Weeks    31,366   - 149,094 mIU/mL  9 Weeks    59,109   - 135,901 mIU/mL  10 Weeks   44,186   - 170,409 mIU/mL  12 Weeks   27,107   - 201,615 mIU/mL  14 Weeks   24,302   -  93,646 mIU/mL  15 Weeks   12,540   -  69,747 mIU/mL  16 Weeks    8,904   -  55,332 mIU/mL  17 Weeks    8,240   -  51,793 mIU/mL  18 Weeks    9,649   -  55,271 mIU/mL     RAINBOW DRAW    Narrative:     The following orders were created for panel order [place] Draw.  Procedure                               Abnormality         Status                     ---------                               -----------         ------                     Green Top (Gel)[[id number]]                                  Final result               Lavender Top[[id number]]                                     Final result               Gold Top - SST[[id number]]                                   Final result               Light Blue Top[[id number]]                                   Final result                 Please view results for these tests on the individual orders.   ETHANOL    Narrative:     Plasma Ethanol Clinical Symptoms:    ETOH (%)               Clinical Symptom  .01-.05              No apparent influence  .03-.12              Euphoria, Diminished judgment and attention   .09-.25              Impaired comprehension, Muscle incoordination  .18-.30              Confusion, Staggered gait, Slurred speech  .25-.40              Markedly decreased response to stimuli, unable to stand or                         walk, vomitting, sleep or stupor  .35-.50              Comatose, Anesthesia, Subnormal body temperature       LIPID PANEL   CBC AND DIFFERENTIAL    Narrative:     The following orders were created for panel order CBC & Differential.  Procedure                               Abnormality         Status                     ---------                               -----------         ------                     CBC Auto Differential[[id number]]        Abnormal            Final result                 Please view results for these tests on the individual orders.   GREEN TOP   LAVENDER TOP   GOLD TOP - SST   LIGHT BLUE TOP     Medications   sodium chloride 0.9 % flush 10 mL (has no administration in time range)   nitroglycerin (NITROSTAT) SL tablet 0.4 mg (has no administration in time range)   sodium chloride 0.9 % flush 10 mL (10 mL Intravenous Given 3/14/25 0111)   sodium chloride 0.9 % flush 10 mL (has no administration in time range)   sodium chloride 0.9 % infusion 40 mL (has no administration in time range)   mupirocin (BACTROBAN) 2 % nasal ointment 1 Application (1 Application Each Nare Not Given 3/14/25 0316)   ipratropium-albuterol (DUO-NEB) nebulizer solution 3 mL (has no administration in time range)   aluminum-magnesium hydroxide-simethicone (MAALOX MAX) 400-400-40 MG/5ML suspension 15 mL (has no administration in time range)   sennosides-docusate (PERICOLACE) 8.6-50 MG per tablet 2 tablet (has no administration in time range)     And   polyethylene glycol (MIRALAX) packet 17 g (has no administration in time range)     And   bisacodyl (DULCOLAX) EC tablet 5 mg (has no administration in time range)     And   bisacodyl (DULCOLAX) suppository 10 mg (has no administration in time range)   acetaminophen (TYLENOL) tablet 650 mg (has no administration in time range)     Or   acetaminophen (TYLENOL) suppository 650 mg (has no administration in time range)   ondansetron ODT (ZOFRAN-ODT) disintegrating tablet 4  mg (has no administration in time range)     Or   ondansetron (ZOFRAN) injection 4 mg (has no administration in time range)   ketorolac (TORADOL) injection 15 mg (15 mg Intravenous Given 3/13/25 2205)   dexAMETHasone (DECADRON) injection 10 mg (10 mg Intravenous Given 3/13/25 2207)   aztreonam (AZACTAM) 2 g in sodium chloride 0.9 % 100 mL MBP (0 g Intravenous Stopped 3/13/25 2315)   metroNIDAZOLE (FLAGYL) IVPB 500 mg (0 mg Intravenous Stopped 3/13/25 2344)   vancomycin IVPB 2000 mg in 0.9% Sodium Chloride 500 mL (2,000 mg Intravenous New Bag 3/13/25 2314)   iopamidol (ISOVUE-370) 76 % injection 100 mL (100 mL Intravenous Given 3/13/25 2344)   LORazepam (ATIVAN) injection 1 mg (1 mg Intravenous Given 3/14/25 0111)     CT Angiogram Chest Pulmonary Embolism  Result Date: 3/14/2025  Impression: No evidence of pulmonary embolism. Bilateral basilar atelectasis. Electronically Signed: Sonny Pastrana MD  3/14/2025 12:02 AM EDT  Workstation ID: [id number]    CT Head Without Contrast  Result Date: 3/13/2025  Impression: Slightly more asymmetric hypoattenuation in the right cerebellum, which may posterior fossa/streak artifacts, though if patient's dizziness persists and there is concern for ischemia, recommend brain MRI. Otherwise, no acute cranial findings. Electronically Signed: Dariel Rogers  3/13/2025 10:52 PM EDT  Workstation ID: [id number]                                                   Medical Decision Making  Problems Addressed:  Bacterial skin infection: complicated acute illness or injury  Coronavirus infection: complicated acute illness or injury  History of methamphetamine abuse: complicated acute illness or injury  Hypoxia: complicated acute illness or injury  Intractable headache, unspecified chronicity pattern, unspecified headache type: complicated acute illness or injury  Mild bibasilar atelectasis: complicated acute illness or injury  Positive urine drug screen: complicated acute illness or injury    Amount  and/or Complexity of Data Reviewed  Labs: ordered.  Radiology: ordered.  ECG/medicine tests: ordered.    Risk  Prescription drug management.  Decision regarding hospitalization.      Patient presents to the ED for the above complaint, underwent the above exam and workup.  The high probability of sudden, clinically significant deterioration in the patient's condition required the highest level of my preparedness to intervene urgently.  The services I provided to this patient were to treat and/or prevent clinically significant deterioration that could result in: Death. Services included the following: chart data review, reviewing nurses notes an/or old charts, documentation time, consultant collaboration regarding findings and treatment options, vital sign assessments and ordering, interpreting and reviewing diagnostic studies/lab test.  Aggregate critical care time was 45min , which includes only time during which I was engaged in work directly related to the patient's care, as described above, whether at the bedside or elsewhere in the Emergency Department. It did not include time spent performing other procedure.    EKG reviewed: EKG is reviewed and interpreted by Dr. Amanda shows sinus tachycardia with a rate of 100 no PVCs PACs or ectopy.  Previous EKG shows sinus rhythm with a rate of 90 from 2/5/2025.    Imaging reviewed: Reviewed and interpreted by Dr. Amanda, ED attending.  Results as above.    Reviewed external records from today at the freestanding ED for headache    Patient was treated with the following while in the ED:  Medications   sodium chloride 0.9 % flush 10 mL (has no administration in time range)   nitroglycerin (NITROSTAT) SL tablet 0.4 mg (has no administration in time range)   sodium chloride 0.9 % flush 10 mL (10 mL Intravenous Given 3/14/25 0111)   sodium chloride 0.9 % flush 10 mL (has no administration in time range)   sodium chloride 0.9 % infusion 40 mL (has no administration in time  range)   mupirocin (BACTROBAN) 2 % nasal ointment 1 Application (1 Application Each Nare Not Given 3/14/25 0316)   ipratropium-albuterol (DUO-NEB) nebulizer solution 3 mL (has no administration in time range)   aluminum-magnesium hydroxide-simethicone (MAALOX MAX) 400-400-40 MG/5ML suspension 15 mL (has no administration in time range)   sennosides-docusate (PERICOLACE) 8.6-50 MG per tablet 2 tablet (has no administration in time range)     And   polyethylene glycol (MIRALAX) packet 17 g (has no administration in time range)     And   bisacodyl (DULCOLAX) EC tablet 5 mg (has no administration in time range)     And   bisacodyl (DULCOLAX) suppository 10 mg (has no administration in time range)   acetaminophen (TYLENOL) tablet 650 mg (has no administration in time range)     Or   acetaminophen (TYLENOL) suppository 650 mg (has no administration in time range)   ondansetron ODT (ZOFRAN-ODT) disintegrating tablet 4 mg (has no administration in time range)     Or   ondansetron (ZOFRAN) injection 4 mg (has no administration in time range)   ketorolac (TORADOL) injection 15 mg (15 mg Intravenous Given 3/13/25 2205)   dexAMETHasone (DECADRON) injection 10 mg (10 mg Intravenous Given 3/13/25 2207)   aztreonam (AZACTAM) 2 g in sodium chloride 0.9 % 100 mL MBP (0 g Intravenous Stopped 3/13/25 2315)   metroNIDAZOLE (FLAGYL) IVPB 500 mg (0 mg Intravenous Stopped 3/13/25 2344)   vancomycin IVPB 2000 mg in 0.9% Sodium Chloride 500 mL (2,000 mg Intravenous New Bag 3/13/25 2314)   iopamidol (ISOVUE-370) 76 % injection 100 mL (100 mL Intravenous Given 3/13/25 2344)   LORazepam (ATIVAN) injection 1 mg (1 mg Intravenous Given 3/14/25 0111)     Evaluation of patient IV was established labs imaging EKG obtained heart cath obtained.  Patient placed on BiPAP after arterial gas and call was made to ICU for placement in the unit related to elevated CO2 of 71.8.  pH of 7.24.  This was discussed with patient who is agreeable and has no further  questions.    Discussed case with Roddy, intensivist NP, who agreed to admit patient.    Final diagnoses:   Hypoxia   History of methamphetamine abuse   Intractable headache, unspecified chronicity pattern, unspecified headache type   Coronavirus infection   Mild bibasilar atelectasis   Positive urine drug screen   Bacterial skin infection       ED Disposition  ED Disposition       ED Disposition   Decision to Admit    Condition   --    Comment   Level of Care: Critical Care [6]   Diagnosis: Acute respiratory failure with hypoxia [207404]   Admitting Physician: ELLI VILLEGAS [[id number]]   Attending Physician: ELLI VILLEGAS [[id number]]   Certification: I Certify That Inpatient Hospital Services Are Medically Necessary For Greater Than 2 Midnights                 No follow-up provider specified.       Medication List        ASK your doctor about these medications      hydrOXYzine 25 MG tablet  Commonly known as: ATARAX  Ask about: Which instructions should I use?                 Reyna Drummond, APRN  03/14/25 0528

## 2025-03-14 NOTE — PAYOR COMM NOTE
"This is a clinical update for Jacqueline Turcios  Reference/Auth # [id number]     INPATIENT AUTHORIZATION PENDING:     Please call or fax determination to contact below.   Thank you.    Modesta Golden, RN, BSN  Utilization Review Nurse  [facility]  Direct & confidential phone # [phone]  Fax # [phone]      Jacqueline Turcios (34 y.o. Female)       Date of Birth   1990    Social Security Number       Address   [address] [place] [address]    Home Phone   [phone]    MRN   [id number]       Spiritism   Nondenominational    Marital Status   Single                            Admission Date   3/13/2025    Admission Type   Emergency    Admitting Provider   Tom Martinez MD    Attending Provider       Department, Room/Bed   [facility] INTENSIVE CARE UNIT, 2314/1       Discharge Date   3/14/2025    Discharge Disposition   Left Against Medical Advice    Discharge Destination                                 Attending Provider: (none)   Allergies: Penicillins    Isolation: Droplet   Infection: None   Code Status: Prior    Ht: 170.2 cm (67.01\")   Wt: 156 kg (343 lb 14.7 oz)    Admission Cmt: None   Principal Problem: Acute respiratory failure with hypoxia [J96.01]                   Active Insurance as of 3/13/2025       Primary Coverage       Payor Plan Insurance Group Employer/Plan Group    [id number]       Payor Plan Address Payor Plan Phone Number Payor Plan Fax Number Effective Dates    MAIL STOP:   10/1/2022 - None Entered    [address] [address]       [facility] [address]         Subscriber Name Subscriber Birth Date Member ID       JACQUELINE TURCIOS 1990 [id number]                     Emergency Contacts        (Rel.) Home Phone Work Phone Mobile Phone    BC RILEY (Mother) [phone] -- [phone]    PRITI JAMES (Significant Other) -- -- [phone]                 History & Physical        Joon, " JOANNE Singh at 25 0108       Attestation signed by Tom Martinez MD at 25 0843    I have reviewed this documentation and agree.    Patient was not personally seen by me but my NP attempted to keep the patient but she signed out AMA and had capacity to do so.                   Critical Care History and Physical     Jacqueline Turicos : 1990 MRN:[id number] LOS:0 ROOM:      Reason for admission: Acute respiratory failure with hypoxia     Assessment / Plan     Acute respiratory failure with hypoxia  CT PE negative  CT PE showed bilateral basilar atelectasis  Echo 2025 EF 65% LV diastolic function consistent with grade 1 impaired relaxation, RVSP normal, no significant valvular abnormalities noted  Currently on BiPAP will transition to Airvo  ABG results noted    Urine drug screen positive amphetamines, benzos, methamphetamine, methadone  Patient has been on methadone treatment  Patient has phentermine prescription last dispensed 2025 unsure if patient currently taking  UDS collected prior to benzo given in ER    Anxiety/depression  Patient had been on Prozac, Lamictal, and Rexulti  Patient reports she stopped taking these medications 2 weeks ago    Leaving AGAINST MEDICAL ADVICE  Patient educated on risk of leaving  Patient verbalizes understanding of risk    Code Status (Patient has no pulse and is not breathing): CPR (Attempt to Resuscitate)  Medical Interventions (Patient has pulse or is breathing): Full Support       Nutrition:   NPO Diet NPO Type: Strict NPO     VTE Prophylaxis:  Mechanical VTE prophylaxis orders are present.         History of Present illness     Jacqueline Turcios is a 34 y.o. female with PMH of +rhinovirus 2 months ago, history of drug abuse, depression, anxiety, hepatitis C, presented to the hospital for shortness of breath. The patient stated that she has felt short of breath since she was diagnosed with rhinovirus in January of this year. The patient reports that  "two weeks ago she stopped taking her antidepressants. She did not have a reason for stopping her medications. She reports that 5 days ago she relapsed on meth. She has several areas on her legs where she reports she missed the vein when trying to inject the meth. She states that she \"Just started hating herself again,\" which is why she relapsed. She states she has not used in 2 days. Patient denies any suicide ideations.     In ER the patient's oxygen saturations were found to be in the 60s on room air and she was placed on bipap. Once patient was admitted to ICU, she became agitated with being limited to what she could due to her hypoxia. A new ABG was obtained and patient was placed on an airvo in hopes to make the patient more comfortable. The patient stated that she wanted to leave AMA. The patient was educated multiple times on the risks of leaving AMA. The patient verified she understood and stated she still wanted to leave AMA. Patient was provided paper worked and left AMA with her  significant other.       ACP: CPR, Full Intervention. No ACP docs on file. Patients mother is listed as her NOK to make decisions for her in the event she is unable.     Patient was seen and examined on 25 at 01:08 EDT .      Past Medical/Surgical/Social/Family History & Allergies     Past Medical History:   Diagnosis Date    Asthma     Kidney stone       Past Surgical History:   Procedure Laterality Date     SECTION      CHOLECYSTECTOMY      DILATION AND CURETTAGE, DIAGNOSTIC / THERAPEUTIC      EXCISION LESION N/A 2024    Procedure: EXCISION of cyst from psterior neck, prone position;  Surgeon: Raul Lawson MD;  Location: [facility];  Service: General;  Laterality: N/A;    URETERAL STENT INSERTION        Social History     Socioeconomic History    Marital status: Single   Tobacco Use    Smoking status: Every Day     Current packs/day: 1.00     Average packs/day: 1 pack/day for 12.2 years (12.2 " ttl pk-yrs)     Types: Cigarettes     Start date: 2013     Passive exposure: Current    Smokeless tobacco: Never   Vaping Use    Vaping status: Never Used   Substance and Sexual Activity    Alcohol use: No    Drug use: Never    Sexual activity: Defer      No family history on file.   Allergies   Allergen Reactions    Penicillins Hives     Beta lactam allergy details  Antibiotic reaction: other (throat closed)  Age at reaction: infant  Dose to reaction time: (!) hours  Reason for antibiotic: unknown  Epinephrine required for reaction?: unknown  Tolerated antibiotics: amoxicillin, cephalexin, cefepime          Social Drivers of Health     Tobacco Use: High Risk (1/30/2025)    Patient History     Smoking Tobacco Use: Every Day     Smokeless Tobacco Use: Never     Passive Exposure: Current   Alcohol Use: Not At Risk (1/31/2025)    AUDIT-C     Frequency of Alcohol Consumption: Never     Average Number of Drinks: Patient does not drink     Frequency of Binge Drinking: Never   Financial Resource Strain: Low Risk  (1/31/2025)    Overall Financial Resource Strain (CARDIA)     Difficulty of Paying Living Expenses: Not very hard   Food Insecurity: No Food Insecurity (2/5/2025)    Hunger Vital Sign     Worried About Running Out of Food in the Last Year: Never true     Ran Out of Food in the Last Year: Never true   Transportation Needs: No Transportation Needs (2/5/2025)    PRAPARE - Transportation     Lack of Transportation (Medical): No     Lack of Transportation (Non-Medical): No   Physical Activity: Sufficiently Active (1/31/2025)    Exercise Vital Sign     Days of Exercise per Week: 7 days     Minutes of Exercise per Session: 60 min   Stress: No Stress Concern Present (1/31/2025)    [employer] - Occupational Stress Questionnaire     Feeling of Stress : Not at all   Social Connections: Not At Risk (1/31/2025)    Family and Community Support     Help with Day-to-Day Activities: I don't need any  help     Lonely or Isolated: Never   Interpersonal Safety: Not At Risk (3/13/2025)    Abuse Screen     Unsafe at Home or Work/School: no     Feels Threatened by Someone?: no     Does Anyone Keep You from Contacting Others or Doint Things Outside the Home?: no     Physical Sign of Abuse Present: no   Depression: Not at risk (1/31/2025)    PHQ-2     PHQ-2 Score: 0   Housing Stability: Not At Risk (2/5/2025)    Housing Stability     Current Living Arrangements: home     Potentially Unsafe Housing Conditions: none   Utilities: Not At Risk (2/5/2025)    [facility] Utilities     Threatened with loss of utilities: No   Health Literacy: Not At Risk (2/5/2025)    Education     Help with school or training?: No     Preferred Language: English   Employment: Not At Risk (1/31/2025)    Employment     Do you want help finding or keeping work or a job?: I do not need or want help   Disabilities: Not At Risk (1/31/2025)    Disabilities     Concentrating, Remembering, or Making Decisions Difficulty: no     Doing Errands Independently Difficulty: no        Home Medications     Prior to Admission medications    Medication Sig Start Date End Date Taking? Authorizing Provider   albuterol sulfate  (90 Base) MCG/ACT inhaler Inhale 2 puffs Every 4 (Four) Hours As Needed for Wheezing. 2/2/25   Marlene Etienne MD   Brexpiprazole 2 MG tablet Take 1 tablet by mouth Daily.    ProviderGiovani MD   FLUoxetine (PROzac) 20 MG capsule Take 2 capsules by mouth Daily.    ProviderGiovani MD   gabapentin (NEURONTIN) 100 MG capsule Take 1 capsule by mouth 3 (Three) Times a Day.    ProviderGiovani MD   hydrOXYzine (ATARAX) 25 MG tablet Take 2 tablets by mouth Every 6 (Six) Hours As Needed for Anxiety.  Patient taking differently: Take 1 tablet by mouth 3 (Three) Times a Day As Needed for Anxiety. 11/14/24   Scarlet Mott PA   ipratropium-albuterol (DUO-NEB) 0.5-2.5 mg/3 ml nebulizer Take 3 mL by nebulization 4 (Four) Times a Day.  1/21/25   Sita East MD   lamoTRIgine (LaMICtal) 25 MG tablet Take 1 tablet by mouth 2 (Two) Times a Day.    Giovani Echevarria MD   methadone (DOLOPHINE) 5 MG tablet Take 177 mg by mouth Daily.    Giovani Echevarria MD   methocarbamol (ROBAXIN) 750 MG tablet Take 1 tablet by mouth 3 (Three) Times a Day.    Giovani Echevarria MD   methylPREDNISolone (MEDROL) 4 MG dose pack Take as directed on package instructions. 2/5/25   Jenni Hassan PA-C   montelukast (SINGULAIR) 10 MG tablet Take 1 tablet by mouth Every Night.    Giovani Echevarria MD   naloxone (NARCAN) 4 MG/0.1ML nasal spray Call 911. Don't prime. [place] in 1 nostril for overdose. Repeat in 2-3 minutes in other nostril if no or minimal breathing/responsiveness. 4/16/24   Raul Lawson MD   predniSONE (DELTASONE) 10 MG (48) dose pack Use as directed 2/2/25   Marlene Etienne MD   promethazine (PHENERGAN) 25 MG tablet Take 1 tablet by mouth Every 6 (Six) Hours As Needed for Nausea or Vomiting. 3/13/25   Keny Borjas MD   vitamin D (ERGOCALCIFEROL) 1.25 MG (50227 UT) capsule capsule Take 1 capsule by mouth 1 (One) Time Per Week.    Giovani Echevarria MD        Objective / Physical Exam     Vital signs:  Temp: (!) 101.4 °F (38.6 °C)  BP: 91/58  Heart Rate: 89  Resp: 20  SpO2: 96 %  Weight: (!) 155 kg (341 lb 14.9 oz)    Admission Weight: Weight: (!) 155 kg (341 lb 14.9 oz)    Physical Exam  Vitals and nursing note reviewed.   Constitutional:       General: She is not in acute distress.     Appearance: She is not ill-appearing.   HENT:      Head: Normocephalic.      Mouth/Throat:      Mouth: Mucous membranes are moist.      Pharynx: Oropharynx is clear.   Eyes:      Extraocular Movements: Extraocular movements intact.      Conjunctiva/sclera: Conjunctivae normal.      Pupils: Pupils are equal, round, and reactive to light.   Cardiovascular:      Rate and Rhythm: Normal rate and regular rhythm.      Pulses: Normal pulses.       Heart sounds: Normal heart sounds.   Pulmonary:      Effort: Tachypnea present.      Breath sounds: Examination of the right-middle field reveals decreased breath sounds. Examination of the left-middle field reveals decreased breath sounds. Examination of the right-lower field reveals decreased breath sounds. Examination of the left-lower field reveals decreased breath sounds. Decreased breath sounds present.   Abdominal:      General: Bowel sounds are normal.      Palpations: Abdomen is soft.   Musculoskeletal:      Right lower leg: No edema.      Left lower leg: No edema.   Skin:     General: Skin is warm and dry.      Findings: No rash.      Comments: Bruising on left upper thigh and knee due to failed injections of meth   Neurological:      General: No focal deficit present.      Mental Status: She is alert and oriented to person, place, and time.   Psychiatric:         Mood and Affect: Mood is anxious.         Behavior: Behavior is uncooperative and agitated.         Thought Content: Thought content does not include suicidal ideation. Thought content does not include suicidal plan.          Labs     Results from last 7 days   Lab Units 03/13/25  2137   WBC 10*3/mm3 6.06   HEMOGLOBIN g/dL 11.6*   HEMATOCRIT % 39.1   PLATELETS 10*3/mm3 231      Results from last 7 days   Lab Units 03/13/25  2137   SODIUM mmol/L 137   POTASSIUM mmol/L 4.8   CHLORIDE mmol/L 101   CO2 mmol/L 30.0*   ANION GAP mmol/L 6.0   BUN mg/dL 5*   CREATININE mg/dL 0.64   GLUCOSE mg/dL 112*   ALT (SGPT) U/L 48*   AST (SGOT) U/L 39*   ALK PHOS U/L 113            Imaging     CT Angiogram Chest Pulmonary Embolism  Result Date: 3/14/2025  Impression: No evidence of pulmonary embolism. Bilateral basilar atelectasis. Electronically Signed: Sonny Pastrana MD  3/14/2025 12:02 AM EDT  Workstation ID: [id number]    CT Head Without Contrast  Result Date: 3/13/2025  Impression: Slightly more asymmetric hypoattenuation in the right cerebellum, which may  posterior fossa/streak artifacts, though if patient's dizziness persists and there is concern for ischemia, recommend brain MRI. Otherwise, no acute cranial findings. Electronically Signed: Dariel Rogers  3/13/2025 10:52 PM EDT  Workstation ID: [id number]    Current Medications     Scheduled Meds:  LORazepam, 1 mg, Intravenous, Once  mupirocin, 1 Application, Each Nare, BID  sodium chloride, 10 mL, Intravenous, Q12H  vancomycin, 2,000 mg, Intravenous, Once         Continuous Infusions:          JOANNE Guerrero   Critical Care  03/14/25   01:08 EDT       Electronically signed by Tom Martinez MD at 03/14/25 0838          Emergency Department Notes        Oralia Chu RN at 03/14/25 0121          Nursing report ED to floor  Jacqueline MCCANN Tma  34 y.o.  female    HPI:   Chief Complaint   Patient presents with    Headache       Admitting doctor:   Tom Martinez MD    Admitting diagnosis:   The primary encounter diagnosis was Hypoxia. Diagnoses of History of methamphetamine abuse, Intractable headache, unspecified chronicity pattern, unspecified headache type, Coronavirus infection, Mild bibasilar atelectasis, Positive urine drug screen, and Bacterial skin infection were also pertinent to this visit.    Code status:   Current Code Status       Date Active Code Status Order ID Comments User Context       3/14/2025 0103 CPR (Attempt to Resuscitate) [id number]  Samantha Dupree APRN ED        Question Answer    Code Status (Patient has no pulse and is not breathing) CPR (Attempt to Resuscitate)    Medical Interventions (Patient has pulse or is breathing) Full Support                    Allergies:   Penicillins    Isolation:  No active isolations     Fall Risk:  Fall Risk Assessment was completed, and patient is at low risk for falls.   Predictive Model Details         3 (Low) Factor Value    Calculated 3/14/2025 01:19 Age 34    Risk of Fall Model Active Peripheral IV Present     Imaging order in this encounter  Present     Number of Distinct Medication Classes administered 8     Respiratory Rate 20     Diastolic BP 58     Magnesium not on file     Tobacco Use Current     David Scale not on file     Number of administrations of Anti-Rheumatics 1     Calcium 8.8 mg/dL     Albumin 3.7 g/dL     ALT 48 U/L     Chloride 101 mmol/L     Potassium 4.8 mmol/L     Creatinine 0.64 mg/dL     Duration of Current Encounter 0.168 days     Total Bilirubin 0.2 mg/dL         Weight:       03/13/25 2108   Weight: (!) 155 kg (341 lb 14.9 oz)       Intake and Output  No intake or output data in the 24 hours ending 03/14/25 0121    Diet:   Dietary Orders (From admission, onward)       Start     Ordered    03/14/25 0104  NPO Diet NPO Type: Strict NPO  Diet Effective Now        Question:  NPO Type  Answer:  Strict NPO    03/14/25 0103                     Most recent vitals:   Vitals:    03/14/25 0000 03/14/25 0017 03/14/25 0032 03/14/25 0119   BP: 105/62 108/68 91/58    BP Location:       Patient Position:       Pulse: 86 84 89 79   Resp:       Temp:       TempSrc:       SpO2: 95% 96% 96% 99%   Weight:       Height:           Active LDAs/IV Access:   Lines, Drains & Airways       Active LDAs       Name Placement date Placement time Site Days    Peripheral IV 03/13/25 2150 Anterior;Distal;Right;Upper Arm 03/13/25 2150  Arm  less than 1    Peripheral IV 03/13/25 2321 Left Antecubital 03/13/25 2321  Antecubital  less than 1                    Skin Condition:   Skin Assessments (last day)       None             Labs (abnormal labs have a star):   Labs Reviewed   RESPIRATORY PANEL PCR W/ COVID-19 (SARS-COV-2), NP SWAB IN UTM/VTP, 2 HR TAT - Abnormal; Notable for the following components:       Result Value    Coronavirus HKU1 Detected (*)     All other components within normal limits    Narrative:     In the setting of a positive respiratory panel with a viral infection PLUS a negative procalcitonin without other underlying concern for bacterial  infection, consider observing off antibiotics or discontinuation of antibiotics and continue supportive care. If the respiratory panel is positive for atypical bacterial infection (Bordetella pertussis, Chlamydophila pneumoniae, or Mycoplasma pneumoniae), consider antibiotic de-escalation to target atypical bacterial infection.   COMPREHENSIVE METABOLIC PANEL - Abnormal; Notable for the following components:    Glucose 112 (*)     BUN 5 (*)     CO2 30.0 (*)     ALT (SGPT) 48 (*)     AST (SGOT) 39 (*)     All other components within normal limits    Narrative:     GFR Categories in Chronic Kidney Disease (CKD)      GFR Category          GFR (mL/min/1.73)    Interpretation  G1                     90 or greater         Normal or high (1)  G2                      60-89                Mild decrease (1)  G3a                   45-59                Mild to moderate decrease  G3b                   30-44                Moderate to severe decrease  G4                    15-29                Severe decrease  G5                    14 or less           Kidney failure          (1)In the absence of evidence of kidney disease, neither GFR category G1 or G2 fulfill the criteria for CKD.    eGFR calculation 2021 CKD-EPI creatinine equation, which does not include race as a factor   URINALYSIS W/ MICROSCOPIC IF INDICATED (NO CULTURE) - Abnormal; Notable for the following components:    Protein, UA Trace (*)     All other components within normal limits    Narrative:     Urine microscopic not indicated.   BNP (IN-HOUSE) - Abnormal; Notable for the following components:    proBNP 557.0 (*)     All other components within normal limits    Narrative:     This assay is used as an aid in the diagnosis of individuals suspected of having heart failure. It can be used as an aid in the diagnosis of acute decompensated heart failure (ADHF) in patients presenting with signs and symptoms of ADHF to the emergency department (ED). In addition,  "NT-proBNP of <300 pg/mL indicates ADHF is not likely.    Age Range Result Interpretation  NT-proBNP Concentration (pg/mL:      <50             Positive            >450                   Gray                 300-450                    Negative             <300    50-75           Positive            >900                  Gray                300-900                  Negative            <300      >75             Positive            >1800                  Gray                300-1800                  Negative            <300   D-DIMER, QUANTITATIVE - Abnormal; Notable for the following components:    D-Dimer, Quantitative 0.86 (*)     All other components within normal limits    Narrative:     According to the assay 's published package insert, a normal (<0.50 MCGFEU/mL) D-dimer result in conjunction with a non-high clinical probability assessment, excludes deep vein thrombosis (DVT) and pulmonary embolism (PE) with high sensitivity.    D-dimer values increase with age and this can make VTE exclusion of an older population difficult. To address this, the American College of Physicians, based on best available evidence and recent guidelines, recommends that clinicians use age-adjusted D-dimer thresholds in patients greater than 50 years of age with: a) a low probability of PE who do not meet all Pulmonary Embolism Rule Out Criteria, or b) in those with intermediate probability of PE.   The formula for an age-adjusted D-dimer cut-off is \"age/100\".  For example, a 60 year old patient would have an age-adjusted cut-off of 0.60 MCGFEU/mL and an 80 year old 0.80 MCGFEU/mL.   CBC WITH AUTO DIFFERENTIAL - Abnormal; Notable for the following components:    Hemoglobin 11.6 (*)     MCHC 29.7 (*)     Neutrophil % 80.4 (*)     Lymphocyte % 9.1 (*)     Eosinophil % 0.2 (*)     Immature Grans % 1.0 (*)     Lymphocytes, Absolute 0.55 (*)     Immature Grans, Absolute 0.06 (*)     All other components within normal limits "   URINE DRUG SCREEN - Abnormal; Notable for the following components:    Methamphetamine, Ur Positive (*)     Amphetamine Screen, Urine Positive (*)     Benzodiazepine Screen, Urine Positive (*)     Methadone Screen, Urine Positive (*)     All other components within normal limits    Narrative:     Cutoff For Drugs Screened:    Amphetamines               500 ng/ml  Barbiturates               200 ng/ml  Benzodiazepines            150 ng/ml  Cocaine                    150 ng/ml  Methadone                  200 ng/ml  Opiates                    100 ng/ml  Phencyclidine               25 ng/ml  THC                         50 ng/ml  Methamphetamine            500 ng/ml  Tricyclic Antidepressants  300 ng/ml  Oxycodone                  100 ng/ml  Buprenorphine               10 ng/ml    The normal value for all drugs tested is negative. This report includes unconfirmed screening results, with the cutoff values listed, to be used for medical treatment purposes only.  Unconfirmed results must not be used for non-medical purposes such as employment or legal testing.  Clinical consideration should be applied to any drug of abuse test, particularly when unconfirmed results are used.    All urine drugs of abuse requests without chain of custody are for medical screening purposes only.  False positives are possible.     TROPONIN - Normal    Narrative:     High Sensitive Troponin T Reference Range:  <14.0 ng/L- Negative Female for AMI  <22.0 ng/L- Negative Male for AMI  >=14 - Abnormal Female indicating possible myocardial injury.  >=22 - Abnormal Male indicating possible myocardial injury.   Clinicians would have to utilize clinical acumen, EKG, Troponin, and serial changes to determine if it is an Acute Myocardial Infarction or myocardial injury due to an underlying chronic condition.        ACETAMINOPHEN LEVEL - Normal    Narrative:     Acetaminophen Therapeutic Range  5-20 ug/mL      Hours after ingestion            Toxic Value     4 Hours                           150 ug/mL    8 Hours                            70 ug/mL   12 Hours                            40 ug/mL   16 Hours                            20 ug/mL    These values apply to a single ingestion only.    SALICYLATE LEVEL - Normal   HIGH SENSITIVITIY TROPONIN T 1HR - Normal    Narrative:     High Sensitive Troponin T Reference Range:  <14.0 ng/L- Negative Female for AMI  <22.0 ng/L- Negative Male for AMI  >=14 - Abnormal Female indicating possible myocardial injury.  >=22 - Abnormal Male indicating possible myocardial injury.   Clinicians would have to utilize clinical acumen, EKG, Troponin, and serial changes to determine if it is an Acute Myocardial Infarction or myocardial injury due to an underlying chronic condition.        POC LACTATE - Normal   BLOOD CULTURE   BLOOD CULTURE   MRSA SCREEN, PCR   HCG, QUANTITATIVE, PREGNANCY    Narrative:     HCG Ranges by Gestational Age    Females - non-pregnant premenopausal   </= 1mIU/mL HCG  Females - postmenopausal               </= 7mIU/mL HCG    3 Weeks       5.4   -      72 mIU/mL  4 Weeks      10.2   -     708 mIU/mL  5 Weeks       217   -   8,245 mIU/mL  6 Weeks       152   -  32,177 mIU/mL  7 Weeks     4,059   - 153,767 mIU/mL  8 Weeks    31,366   - 149,094 mIU/mL  9 Weeks    59,109   - 135,901 mIU/mL  10 Weeks   44,186   - 170,409 mIU/mL  12 Weeks   27,107   - 201,615 mIU/mL  14 Weeks   24,302   -  93,646 mIU/mL  15 Weeks   12,540   -  69,747 mIU/mL  16 Weeks    8,904   -  55,332 mIU/mL  17 Weeks    8,240   -  51,793 mIU/mL  18 Weeks    9,649   -  55,271 mIU/mL     RAINBOW DRAW    Narrative:     The following orders were created for panel order [place] Draw.  Procedure                               Abnormality         Status                     ---------                               -----------         ------                     Green Top (Gel)[[id number]]                                  Final result               Lavender  Top[[id number]]                                     Final result               Gold Top - SST[[id number]]                                   Final result               Light Blue Top[[id number]]                                   Final result                 Please view results for these tests on the individual orders.   ETHANOL    Narrative:     Plasma Ethanol Clinical Symptoms:    ETOH (%)               Clinical Symptom  .01-.05              No apparent influence  .03-.12              Euphoria, Diminished judgment and attention   .09-.25              Impaired comprehension, Muscle incoordination  .18-.30              Confusion, Staggered gait, Slurred speech  .25-.40              Markedly decreased response to stimuli, unable to stand or                        walk, vomitting, sleep or stupor  .35-.50              Comatose, Anesthesia, Subnormal body temperature       BLOOD GAS, ARTERIAL   LIPID PANEL   CBC AND DIFFERENTIAL    Narrative:     The following orders were created for panel order CBC & Differential.  Procedure                               Abnormality         Status                     ---------                               -----------         ------                     CBC Auto Differential[[id number]]        Abnormal            Final result                 Please view results for these tests on the individual orders.   GREEN TOP   LAVENDER TOP   [facility]   LIGHT BLUE TOP       LOC: Person, Place, Time, and Situation    Telemetry:  Critical Care    Cardiac Monitoring Ordered: yes    EKG:   ECG 12 Lead Dyspnea   Preliminary Result   HEART TAQS=898  bpm   RR Wrkwwwrx=018  ms   NV Dinvmsac=806  ms   P Horizontal Axis=-11  deg   P Front Axis=51  deg   QRSD Interval=67  ms   QT Rfzduvom=133  ms   ZTeF=977  ms   QRS Axis=45  deg   T Wave Axis=52  deg   - BORDERLINE ECG -   Sinus tachycardia   Probable left atrial enlargement   Date and Time of Study:2025-03-13 21:46:41          Medications Given in the ED:    Medications   sodium chloride 0.9 % flush 10 mL (has no administration in time range)   nitroglycerin (NITROSTAT) SL tablet 0.4 mg (has no administration in time range)   sodium chloride 0.9 % flush 10 mL (10 mL Intravenous Given 3/14/25 0111)   sodium chloride 0.9 % flush 10 mL (has no administration in time range)   sodium chloride 0.9 % infusion 40 mL (has no administration in time range)   mupirocin (BACTROBAN) 2 % nasal ointment 1 Application (has no administration in time range)   ipratropium-albuterol (DUO-NEB) nebulizer solution 3 mL (has no administration in time range)   aluminum-magnesium hydroxide-simethicone (MAALOX MAX) 400-400-40 MG/5ML suspension 15 mL (has no administration in time range)   sennosides-docusate (PERICOLACE) 8.6-50 MG per tablet 2 tablet (has no administration in time range)     And   polyethylene glycol (MIRALAX) packet 17 g (has no administration in time range)     And   bisacodyl (DULCOLAX) EC tablet 5 mg (has no administration in time range)     And   bisacodyl (DULCOLAX) suppository 10 mg (has no administration in time range)   acetaminophen (TYLENOL) tablet 650 mg (has no administration in time range)     Or   acetaminophen (TYLENOL) suppository 650 mg (has no administration in time range)   ondansetron ODT (ZOFRAN-ODT) disintegrating tablet 4 mg (has no administration in time range)     Or   ondansetron (ZOFRAN) injection 4 mg (has no administration in time range)   ketorolac (TORADOL) injection 15 mg (15 mg Intravenous Given 3/13/25 2205)   dexAMETHasone (DECADRON) injection 10 mg (10 mg Intravenous Given 3/13/25 2207)   aztreonam (AZACTAM) 2 g in sodium chloride 0.9 % 100 mL MBP (0 g Intravenous Stopped 3/13/25 2315)   metroNIDAZOLE (FLAGYL) IVPB 500 mg (0 mg Intravenous Stopped 3/13/25 2344)   vancomycin IVPB 2000 mg in 0.9% Sodium Chloride 500 mL (2,000 mg Intravenous New Bag 3/13/25 2314)   iopamidol (ISOVUE-370) 76 % injection 100 mL (100 mL Intravenous Given 3/13/25  2344)   LORazepam (ATIVAN) injection 1 mg (1 mg Intravenous Given 3/14/25 0111)       Imaging results:  CT Angiogram Chest Pulmonary Embolism  Result Date: 3/14/2025  Impression: No evidence of pulmonary embolism. Bilateral basilar atelectasis. Electronically Signed: Sonny Pastrana MD  3/14/2025 12:02 AM EDT  Workstation ID: [id number]    CT Head Without Contrast  Result Date: 3/13/2025  Impression: Slightly more asymmetric hypoattenuation in the right cerebellum, which may posterior fossa/streak artifacts, though if patient's dizziness persists and there is concern for ischemia, recommend brain MRI. Otherwise, no acute cranial findings. Electronically Signed: Dariel Rogers  3/13/2025 10:52 PM EDT  Workstation ID: [id number]      Social issues:   Social History     Socioeconomic History    Marital status: Single   Tobacco Use    Smoking status: Every Day     Current packs/day: 1.00     Average packs/day: 1 pack/day for 12.2 years (12.2 ttl pk-yrs)     Types: Cigarettes     Start date: 2013     Passive exposure: Current    Smokeless tobacco: Never   Vaping Use    Vaping status: Never Used   Substance and Sexual Activity    Alcohol use: No    Drug use: Never    Sexual activity: Defer       NIH Stroke Scale:  Interval: (not recorded)  1a. Level of Consciousness: (not recorded)  1b. LOC Questions: (not recorded)  1c. LOC Commands: (not recorded)  2. Best Gaze: (not recorded)  3. Visual: (not recorded)  4. Facial Palsy: (not recorded)  5a. Motor Arm, Left: (not recorded)  5b. Motor Arm, Right: (not recorded)  6a. Motor Leg, Left: (not recorded)  6b. Motor Leg, Right: (not recorded)  7. Limb Ataxia: (not recorded)  8. Sensory: (not recorded)  9. Best Language: (not recorded)  10. Dysarthria: (not recorded)  11. Extinction and Inattention (formerly Neglect): (not recorded)    Total (NIH Stroke Scale): (not recorded)     Additional notable assessment information:NA     Nursing report ED to floor:  JACKY Cox RN    03/14/25 01:21 EDT     Electronically signed by Oralia Chu RN at 03/14/25 0122       Shelbi Sehrwood RN at 03/13/25 2150          Patient reports having headache all day.  States she was seen at urgent care earlier and given unknown medication.  Patient reports relapsing on meth 5 days ago.  Patient has redness to area on R arm, and multiple bruising to legs from missed injections.      Electronically signed by Shelbi Sherwood RN at 03/13/25 2152       Reyna Drummond APRN at 03/13/25 2132          Subjective   Chief Complaint   Patient presents with    Headache       History of Present Illness  Patient reports that she was seen at the freestanding ED for a headache earlier today and was given unknown medications however reports that she is still having a headache as well as dizziness nausea and light sensitivity has a history of intermittent migraines.  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  She reports multiple failed injections on her arms and legs that are causing her pain and discomfort.  On her arrival she was 61% on room air and was immediately placed in a room on a nonrebreather.  Review of Systems   Constitutional:  Positive for diaphoresis, fatigue and fever.   Respiratory:  Positive for cough and shortness of breath.    Cardiovascular:  Positive for chest pain.   Musculoskeletal:  Positive for myalgias.   Skin:  Positive for color change and wound.   All other systems reviewed and are negative.      Past Medical History:   Diagnosis Date    Asthma     Kidney stone        Allergies   Allergen Reactions    Penicillins Hives     Beta lactam allergy details  Antibiotic reaction: other (throat closed)  Age at reaction: infant  Dose to reaction time: (!) hours  Reason for antibiotic: unknown  Epinephrine required for reaction?: unknown  Tolerated antibiotics: amoxicillin, cephalexin, cefepime           Past Surgical History:    Procedure Laterality Date     SECTION      CHOLECYSTECTOMY      DILATION AND CURETTAGE, DIAGNOSTIC / THERAPEUTIC      EXCISION LESION N/A 2024    Procedure: EXCISION of cyst from psterior neck, prone position;  Surgeon: Raul Lawson MD;  Location: [facility] MAIN OR;  Service: General;  Laterality: N/A;    URETERAL STENT INSERTION         History reviewed. No pertinent family history.    Social History     Socioeconomic History    Marital status: Single   Tobacco Use    Smoking status: Every Day     Current packs/day: 1.00     Average packs/day: 1 pack/day for 12.2 years (12.2 ttl pk-yrs)     Types: Cigarettes     Start date:      Passive exposure: Current    Smokeless tobacco: Never   Vaping Use    Vaping status: Never Used   Substance and Sexual Activity    Alcohol use: No    Drug use: Never    Sexual activity: Defer           Objective   Physical Exam  Vitals and nursing note reviewed.   Constitutional:       General: She is in acute distress.      Appearance: Normal appearance. She is obese. She is ill-appearing and diaphoretic. She is not toxic-appearing.   HENT:      Head: Normocephalic and atraumatic.      Right Ear: Tympanic membrane, ear canal and external ear normal.      Left Ear: Tympanic membrane, ear canal and external ear normal.      Nose: Nose normal.      Mouth/Throat:      Mouth: Mucous membranes are moist.      Pharynx: Oropharynx is clear.   Eyes:      Extraocular Movements: Extraocular movements intact.      Conjunctiva/sclera: Conjunctivae normal.      Pupils: Pupils are equal, round, and reactive to light.   Cardiovascular:      Rate and Rhythm: Normal rate and regular rhythm.      Pulses: Normal pulses.      Heart sounds: Normal heart sounds.   Pulmonary:      Effort: Respiratory distress present.      Breath sounds: Wheezing present.   Abdominal:      General: Bowel sounds are normal.      Palpations: Abdomen is soft.   Musculoskeletal:         General: Normal  "range of motion.      Cervical back: Normal range of motion and neck supple.   Skin:     General: Skin is warm and dry.      Capillary Refill: Capillary refill takes less than 2 seconds.      Findings: Erythema and lesion present.   Neurological:      General: No focal deficit present.      Mental Status: She is alert.   Psychiatric:         Mood and Affect: Mood normal.         Behavior: Behavior normal.         Thought Content: Thought content normal.         Judgment: Judgment normal.         Procedures          ED Course        /77   Pulse 83   Temp 97.4 °F (36.3 °C) (Axillary)   Resp 21   Ht 170.2 cm (67.01\")   Wt (!) 156 kg (343 lb 14.7 oz)   LMP 02/15/2025 (Approximate)   SpO2 94%   BMI 53.85 kg/m²   Labs Reviewed   RESPIRATORY PANEL PCR W/ COVID-19 (SARS-COV-2), NP SWAB IN UTM/VTP, 2 HR TAT - Abnormal; Notable for the following components:       Result Value    Coronavirus HKU1 Detected (*)     All other components within normal limits    Narrative:     In the setting of a positive respiratory panel with a viral infection PLUS a negative procalcitonin without other underlying concern for bacterial infection, consider observing off antibiotics or discontinuation of antibiotics and continue supportive care. If the respiratory panel is positive for atypical bacterial infection (Bordetella pertussis, Chlamydophila pneumoniae, or Mycoplasma pneumoniae), consider antibiotic de-escalation to target atypical bacterial infection.   COMPREHENSIVE METABOLIC PANEL - Abnormal; Notable for the following components:    Glucose 112 (*)     BUN 5 (*)     CO2 30.0 (*)     ALT (SGPT) 48 (*)     AST (SGOT) 39 (*)     All other components within normal limits    Narrative:     GFR Categories in Chronic Kidney Disease (CKD)      GFR Category          GFR (mL/min/1.73)    Interpretation  G1                     90 or greater         Normal or high (1)  G2                      60-89                Mild decrease " (1)  G3a                   45-59                Mild to moderate decrease  G3b                   30-44                Moderate to severe decrease  G4                    15-29                Severe decrease  G5                    14 or less           Kidney failure          (1)In the absence of evidence of kidney disease, neither GFR category G1 or G2 fulfill the criteria for CKD.    eGFR calculation 2021 CKD-EPI creatinine equation, which does not include race as a factor   URINALYSIS W/ MICROSCOPIC IF INDICATED (NO CULTURE) - Abnormal; Notable for the following components:    Protein, UA Trace (*)     All other components within normal limits    Narrative:     Urine microscopic not indicated.   BNP (IN-HOUSE) - Abnormal; Notable for the following components:    proBNP 557.0 (*)     All other components within normal limits    Narrative:     This assay is used as an aid in the diagnosis of individuals suspected of having heart failure. It can be used as an aid in the diagnosis of acute decompensated heart failure (ADHF) in patients presenting with signs and symptoms of ADHF to the emergency department (ED). In addition, NT-proBNP of <300 pg/mL indicates ADHF is not likely.    Age Range Result Interpretation  NT-proBNP Concentration (pg/mL:      <50             Positive            >450                   Gray                 300-450                    Negative             <300    50-75           Positive            >900                  Gray                300-900                  Negative            <300      >75             Positive            >1800                  Gray                300-1800                  Negative            <300   D-DIMER, QUANTITATIVE - Abnormal; Notable for the following components:    D-Dimer, Quantitative 0.86 (*)     All other components within normal limits    Narrative:     According to the assay 's published package insert, a normal (<0.50 MCGFEU/mL) D-dimer result in  "conjunction with a non-high clinical probability assessment, excludes deep vein thrombosis (DVT) and pulmonary embolism (PE) with high sensitivity.    D-dimer values increase with age and this can make VTE exclusion of an older population difficult. To address this, the [employer], based on best available evidence and recent guidelines, recommends that clinicians use age-adjusted D-dimer thresholds in patients greater than 50 years of age with: a) a low probability of PE who do not meet all Pulmonary Embolism Rule Out Criteria, or b) in those with intermediate probability of PE.   The formula for an age-adjusted D-dimer cut-off is \"age/100\".  For example, a 60 year old patient would have an age-adjusted cut-off of 0.60 MCGFEU/mL and an 80 year old 0.80 MCGFEU/mL.   CBC WITH AUTO DIFFERENTIAL - Abnormal; Notable for the following components:    Hemoglobin 11.6 (*)     MCHC 29.7 (*)     Neutrophil % 80.4 (*)     Lymphocyte % 9.1 (*)     Eosinophil % 0.2 (*)     Immature Grans % 1.0 (*)     Lymphocytes, Absolute 0.55 (*)     Immature Grans, Absolute 0.06 (*)     All other components within normal limits   URINE DRUG SCREEN - Abnormal; Notable for the following components:    Methamphetamine, Ur Positive (*)     Amphetamine Screen, Urine Positive (*)     Benzodiazepine Screen, Urine Positive (*)     Methadone Screen, Urine Positive (*)     All other components within normal limits    Narrative:     Cutoff For Drugs Screened:    Amphetamines               500 ng/ml  Barbiturates               200 ng/ml  Benzodiazepines            150 ng/ml  Cocaine                    150 ng/ml  Methadone                  200 ng/ml  Opiates                    100 ng/ml  Phencyclidine               25 ng/ml  THC                         50 ng/ml  Methamphetamine            500 ng/ml  Tricyclic Antidepressants  300 ng/ml  Oxycodone                  100 ng/ml  Buprenorphine               10 ng/ml    The normal value for " all drugs tested is negative. This report includes unconfirmed screening results, with the cutoff values listed, to be used for medical treatment purposes only.  Unconfirmed results must not be used for non-medical purposes such as employment or legal testing.  Clinical consideration should be applied to any drug of abuse test, particularly when unconfirmed results are used.    All urine drugs of abuse requests without chain of custody are for medical screening purposes only.  False positives are possible.     BLOOD GAS, ARTERIAL - Abnormal; Notable for the following components:    pH, Arterial 7.245 (*)     pCO2, Arterial 71.8 (*)     HCO3, Arterial 31.1 (*)     O2 Saturation, Arterial 93.5 (*)     CO2 Content 33.3 (*)     All other components within normal limits   BLOOD GAS, ARTERIAL - Abnormal; Notable for the following components:    pH, Arterial 7.313 (*)     pCO2, Arterial 61.0 (*)     pO2, Arterial 163.8 (*)     HCO3, Arterial 30.9 (*)     Base Excess, Arterial 3.2 (*)     O2 Saturation, Arterial 99.3 (*)     CO2 Content 32.8 (*)     All other components within normal limits   TROPONIN - Normal    Narrative:     High Sensitive Troponin T Reference Range:  <14.0 ng/L- Negative Female for AMI  <22.0 ng/L- Negative Male for AMI  >=14 - Abnormal Female indicating possible myocardial injury.  >=22 - Abnormal Male indicating possible myocardial injury.   Clinicians would have to utilize clinical acumen, EKG, Troponin, and serial changes to determine if it is an Acute Myocardial Infarction or myocardial injury due to an underlying chronic condition.        ACETAMINOPHEN LEVEL - Normal    Narrative:     Acetaminophen Therapeutic Range  5-20 ug/mL      Hours after ingestion            Toxic Value    4 Hours                           150 ug/mL    8 Hours                            70 ug/mL   12 Hours                            40 ug/mL   16 Hours                            20 ug/mL    These values apply to a single  ingestion only.    SALICYLATE LEVEL - Normal   HIGH SENSITIVITIY TROPONIN T 1HR - Normal    Narrative:     High Sensitive Troponin T Reference Range:  <14.0 ng/L- Negative Female for AMI  <22.0 ng/L- Negative Male for AMI  >=14 - Abnormal Female indicating possible myocardial injury.  >=22 - Abnormal Male indicating possible myocardial injury.   Clinicians would have to utilize clinical acumen, EKG, Troponin, and serial changes to determine if it is an Acute Myocardial Infarction or myocardial injury due to an underlying chronic condition.        POC LACTATE - Normal   BLOOD CULTURE   BLOOD CULTURE   MRSA SCREEN, PCR   HCG, QUANTITATIVE, PREGNANCY    Narrative:     HCG Ranges by Gestational Age    Females - non-pregnant premenopausal   </= 1mIU/mL HCG  Females - postmenopausal               </= 7mIU/mL HCG    3 Weeks       5.4   -      72 mIU/mL  4 Weeks      10.2   -     708 mIU/mL  5 Weeks       217   -   8,245 mIU/mL  6 Weeks       152   -  32,177 mIU/mL  7 Weeks     4,059   - 153,767 mIU/mL  8 Weeks    31,366   - 149,094 mIU/mL  9 Weeks    59,109   - 135,901 mIU/mL  10 Weeks   44,186   - 170,409 mIU/mL  12 Weeks   27,107   - 201,615 mIU/mL  14 Weeks   24,302   -  93,646 mIU/mL  15 Weeks   12,540   -  69,747 mIU/mL  16 Weeks    8,904   -  55,332 mIU/mL  17 Weeks    8,240   -  51,793 mIU/mL  18 Weeks    9,649   -  55,271 mIU/mL     RAINBOW DRAW    Narrative:     The following orders were created for panel order [place] Draw.  Procedure                               Abnormality         Status                     ---------                               -----------         ------                     Green Top (Gel)[[id number]]                                  Final result               Lavender Top[[id number]]                                     Final result               Gold Top - SST[[id number]]                                   Final result               Light Blue Top[[id number]]                                    Final result                 Please view results for these tests on the individual orders.   ETHANOL    Narrative:     Plasma Ethanol Clinical Symptoms:    ETOH (%)               Clinical Symptom  .01-.05              No apparent influence  .03-.12              Euphoria, Diminished judgment and attention   .09-.25              Impaired comprehension, Muscle incoordination  .18-.30              Confusion, Staggered gait, Slurred speech  .25-.40              Markedly decreased response to stimuli, unable to stand or                        walk, vomitting, sleep or stupor  .35-.50              Comatose, Anesthesia, Subnormal body temperature       LIPID PANEL   CBC AND DIFFERENTIAL    Narrative:     The following orders were created for panel order CBC & Differential.  Procedure                               Abnormality         Status                     ---------                               -----------         ------                     CBC Auto Differential[[id number]]        Abnormal            Final result                 Please view results for these tests on the individual orders.   GREEN TOP   LAVENDER TOP   GOLD TOP - SST   LIGHT BLUE TOP     Medications   sodium chloride 0.9 % flush 10 mL (has no administration in time range)   nitroglycerin (NITROSTAT) SL tablet 0.4 mg (has no administration in time range)   sodium chloride 0.9 % flush 10 mL (10 mL Intravenous Given 3/14/25 0111)   sodium chloride 0.9 % flush 10 mL (has no administration in time range)   sodium chloride 0.9 % infusion 40 mL (has no administration in time range)   mupirocin (BACTROBAN) 2 % nasal ointment 1 Application (1 Application Each Nare Not Given 3/14/25 0316)   ipratropium-albuterol (DUO-NEB) nebulizer solution 3 mL (has no administration in time range)   aluminum-magnesium hydroxide-simethicone (MAALOX MAX) 400-400-40 MG/5ML suspension 15 mL (has no administration in time range)   sennosides-docusate (PERICOLACE) 8.6-50 MG per tablet  2 tablet (has no administration in time range)     And   polyethylene glycol (MIRALAX) packet 17 g (has no administration in time range)     And   bisacodyl (DULCOLAX) EC tablet 5 mg (has no administration in time range)     And   bisacodyl (DULCOLAX) suppository 10 mg (has no administration in time range)   acetaminophen (TYLENOL) tablet 650 mg (has no administration in time range)     Or   acetaminophen (TYLENOL) suppository 650 mg (has no administration in time range)   ondansetron ODT (ZOFRAN-ODT) disintegrating tablet 4 mg (has no administration in time range)     Or   ondansetron (ZOFRAN) injection 4 mg (has no administration in time range)   ketorolac (TORADOL) injection 15 mg (15 mg Intravenous Given 3/13/25 2205)   dexAMETHasone (DECADRON) injection 10 mg (10 mg Intravenous Given 3/13/25 2207)   aztreonam (AZACTAM) 2 g in sodium chloride 0.9 % 100 mL MBP (0 g Intravenous Stopped 3/13/25 2315)   metroNIDAZOLE (FLAGYL) IVPB 500 mg (0 mg Intravenous Stopped 3/13/25 2344)   vancomycin IVPB 2000 mg in 0.9% Sodium Chloride 500 mL (2,000 mg Intravenous New Bag 3/13/25 2314)   iopamidol (ISOVUE-370) 76 % injection 100 mL (100 mL Intravenous Given 3/13/25 2344)   LORazepam (ATIVAN) injection 1 mg (1 mg Intravenous Given 3/14/25 0111)     CT Angiogram Chest Pulmonary Embolism  Result Date: 3/14/2025  Impression: No evidence of pulmonary embolism. Bilateral basilar atelectasis. Electronically Signed: Sonny Pastrana MD  3/14/2025 12:02 AM EDT  Workstation ID: [id number]    CT Head Without Contrast  Result Date: 3/13/2025  Impression: Slightly more asymmetric hypoattenuation in the right cerebellum, which may posterior fossa/streak artifacts, though if patient's dizziness persists and there is concern for ischemia, recommend brain MRI. Otherwise, no acute cranial findings. Electronically Signed: Dariel Rogers  3/13/2025 10:52 PM EDT  Workstation ID: [id number]                                                   Medical  Decision Making  Problems Addressed:  Bacterial skin infection: complicated acute illness or injury  Coronavirus infection: complicated acute illness or injury  History of methamphetamine abuse: complicated acute illness or injury  Hypoxia: complicated acute illness or injury  Intractable headache, unspecified chronicity pattern, unspecified headache type: complicated acute illness or injury  Mild bibasilar atelectasis: complicated acute illness or injury  Positive urine drug screen: complicated acute illness or injury    Amount and/or Complexity of Data Reviewed  Labs: ordered.  Radiology: ordered.  ECG/medicine tests: ordered.    Risk  Prescription drug management.  Decision regarding hospitalization.      Patient presents to the ED for the above complaint, underwent the above exam and workup.  The high probability of sudden, clinically significant deterioration in the patient's condition required the highest level of my preparedness to intervene urgently.  The services I provided to this patient were to treat and/or prevent clinically significant deterioration that could result in: Death. Services included the following: chart data review, reviewing nurses notes an/or old charts, documentation time, consultant collaboration regarding findings and treatment options, vital sign assessments and ordering, interpreting and reviewing diagnostic studies/lab test.  Aggregate critical care time was 45min , which includes only time during which I was engaged in work directly related to the patient's care, as described above, whether at the bedside or elsewhere in the Emergency Department. It did not include time spent performing other procedure.    EKG reviewed: EKG is reviewed and interpreted by Dr. Amanda shows sinus tachycardia with a rate of 100 no PVCs PACs or ectopy.  Previous EKG shows sinus rhythm with a rate of 90 from 2/5/2025.    Imaging reviewed: Reviewed and interpreted by Dr. Amanda, ED attending.  Results as  above.    Reviewed external records from today at the [facility] ED for headache    Patient was treated with the following while in the ED:  Medications   sodium chloride 0.9 % flush 10 mL (has no administration in time range)   nitroglycerin (NITROSTAT) SL tablet 0.4 mg (has no administration in time range)   sodium chloride 0.9 % flush 10 mL (10 mL Intravenous Given 3/14/25 0111)   sodium chloride 0.9 % flush 10 mL (has no administration in time range)   sodium chloride 0.9 % infusion 40 mL (has no administration in time range)   mupirocin (BACTROBAN) 2 % nasal ointment 1 Application (1 Application Each Nare Not Given 3/14/25 0316)   ipratropium-albuterol (DUO-NEB) nebulizer solution 3 mL (has no administration in time range)   aluminum-magnesium hydroxide-simethicone (MAALOX MAX) 400-400-40 MG/5ML suspension 15 mL (has no administration in time range)   sennosides-docusate (PERICOLACE) 8.6-50 MG per tablet 2 tablet (has no administration in time range)     And   polyethylene glycol (MIRALAX) packet 17 g (has no administration in time range)     And   bisacodyl (DULCOLAX) EC tablet 5 mg (has no administration in time range)     And   bisacodyl (DULCOLAX) suppository 10 mg (has no administration in time range)   acetaminophen (TYLENOL) tablet 650 mg (has no administration in time range)     Or   acetaminophen (TYLENOL) suppository 650 mg (has no administration in time range)   ondansetron ODT (ZOFRAN-ODT) disintegrating tablet 4 mg (has no administration in time range)     Or   ondansetron (ZOFRAN) injection 4 mg (has no administration in time range)   ketorolac (TORADOL) injection 15 mg (15 mg Intravenous Given 3/13/25 2205)   dexAMETHasone (DECADRON) injection 10 mg (10 mg Intravenous Given 3/13/25 2207)   aztreonam (AZACTAM) 2 g in sodium chloride 0.9 % 100 mL MBP (0 g Intravenous Stopped 3/13/25 2315)   metroNIDAZOLE (FLAGYL) IVPB 500 mg (0 mg Intravenous Stopped 3/13/25 2344)   vancomycin IVPB 2000 mg in  0.9% Sodium Chloride 500 mL (2,000 mg Intravenous New Bag 3/13/25 5594)   iopamidol (ISOVUE-370) 76 % injection 100 mL (100 mL Intravenous Given 3/13/25 4544)   LORazepam (ATIVAN) injection 1 mg (1 mg Intravenous Given 3/14/25 0111)     Evaluation of patient IV was established labs imaging EKG obtained heart cath obtained.  Patient placed on BiPAP after arterial gas and call was made to ICU for placement in the unit related to elevated CO2 of 71.8.  pH of 7.24.  This was discussed with patient who is agreeable and has no further questions.    Discussed case with Roddy, intensivist NP, who agreed to admit patient.    Final diagnoses:   Hypoxia   History of methamphetamine abuse   Intractable headache, unspecified chronicity pattern, unspecified headache type   Coronavirus infection   Mild bibasilar atelectasis   Positive urine drug screen   Bacterial skin infection       ED Disposition  ED Disposition       ED Disposition   Decision to Admit    Condition   --    Comment   Level of Care: Critical Care [6]   Diagnosis: Acute respiratory failure with hypoxia [566918]   Admitting Physician: ELLI VILLEGAS [[id number]]   Attending Physician: ELLI VILLEGAS [[id number]]   Certification: I Certify That Inpatient Hospital Services Are Medically Necessary For Greater Than 2 Midnights                 No follow-up provider specified.       Medication List        ASK your doctor about these medications      hydrOXYzine 25 MG tablet  Commonly known as: ATARAX  Ask about: Which instructions should I use?                 Reyna Drummond APRN  03/14/25 0528      Electronically signed by Reyna Drummond APRN at 03/14/25 0528       Vital Signs (last day) before discharge       Date/Time Temp Temp src Pulse Resp BP Patient Position SpO2    03/14/25 0400 -- -- 83 -- 122/77 -- 94    03/14/25 0200 -- -- 79 -- 121/90 -- 99    03/14/25 0150 97.4 (36.3) Axillary 77 21 136/98 Lying 95    03/14/25 0141 -- -- 83 30 -- -- 93    03/14/25 0119 -- -- 79  -- -- -- 99    25 0032 -- -- 89 -- 91/58 -- 96    25 0017 -- -- 84 -- 108/68 -- 96    25 0000 -- -- 86 -- 105/62 -- 95    25 -- -- 86 -- 117/76 -- 94    25 -- -- 84 -- 114/73 -- 94    25 -- -- 99 -- 110/70 -- --    25 -- -- 102 -- -- -- --    25 -- -- 102 -- -- -- 94    25 21:18:31 -- -- -- -- -- -- 80    25 101.4 (38.6) Oral 114 20 118/34 Sitting 61          Oxygen Therapy (last day) before discharge       Date/Time SpO2 Device (Oxygen Therapy) Flow (L/min) (Oxygen Therapy) Oxygen Concentration (%) ETCO2 (mmHg)    25 040 94 -- -- -- --    25 0200 99 -- -- -- --    25 0150 95 NPPV/NIV -- 80 --    25 93 NPPV/NIV -- 80 --    25 0140 -- -- -- 80 --    25 0119 99 -- -- -- --    25 96 -- -- -- --    25 0017 96 -- -- -- --    25 0000 95 -- -- -- --    25 94 -- -- -- --    25 94 -- -- -- --    25 -- humidified;high-flow nasal cannula 15 -- --    25 94 -- -- -- --    25 21:18:31 80 nasal cannula 6 -- --    25 61 room air -- -- --          Physician Progress Notes (all)    No notes of this type exist for this encounter.       Consult Notes (all)    No notes of this type exist for this encounter.          Discharge Summary        Samantha Dupree APRN at 25 0425       Attestation signed by Tom Martinez MD at 25 1633    I have reviewed this documentation and agree.    Patient was not personally seen or examined by me as it was overnight and she left prior to my return. But I was told she had capacity and signed out AMA.                  CRITICAL CARE DISCHARGE SUMMARY           PATIENT NAME:  Jacqueline Turcios             :  1990            MRN:  [id number]    DATE OF ADMISSION: 3/13/2025     DATE OF DISCHARGE: 3/14/2025    DISCHARGE DIAGNOSES:   Acute respiratory failure with  "hypoxia      HOSPITAL COURSE  Jacqueline Turcios is a 34 y.o. female with PMH of +rhinovirus 2 months ago, history of drug abuse, depression, anxiety, hepatitis C, presented to the hospital for shortness of breath. The patient stated that she has felt short of breath since she was diagnosed with rhinovirus in January of this year. The patient reports that two weeks ago she stopped taking her antidepressants. She did not have a reason for stopping her medications. She reports that 5 days ago she relapsed on meth. She has several areas on her legs where she reports she missed the vein when trying to inject the meth. She states that she \"Just started hating herself again,\" which is why she relapsed. She states she has not used in 2 days. Patient denies any suicide ideations.      In ER the patient's oxygen saturations were found to be in the 60s on room air and she was placed on bipap. Once patient was admitted to ICU, she became agitated with being limited to what she could due to her hypoxia. A new ABG was obtained and patient was placed on an airvo in hopes to make the patient more comfortable. The patient stated that she wanted to leave AMA. The patient was educated multiple times on the risks of leaving AMA. The patient verified she understood and stated she still wanted to leave AMA. Patient was provided paper worked and left AMA with her  significant other.   PROCEDURES PERFORMED         LABS/RADIOLOGICAL STUDIES  Lab Results (last 72 hours)       Procedure Component Value Units Date/Time    Blood Gas, Arterial - [[id number]]  (Abnormal) Collected: 03/14/25 0300    Specimen: Arterial Blood Updated: 03/14/25 0304     Site Right Radial     Shay's Test Positive     pH, Arterial 7.313 pH units      pCO2, Arterial 61.0 mm Hg      pO2, Arterial 163.8 mm Hg      HCO3, Arterial 30.9 mmol/L      Base Excess, Arterial 3.2 mmol/L      Comment: Serial Number: [id number]:  [id number]        O2 Saturation, Arterial 99.3 %      CO2 " Content 32.8 mmol/L      Barometric Pressure for Blood Gas --     Comment: N/A        Modality BiPap     FIO2 80 %      Ventilator Mode NIV     Set Tidal Volume 500     Hemodilution No     Respiratory Rate 24     Inspiratory Time 1     PO2/FIO2 205    Blood Gas, Arterial - [[id number]]  (Abnormal) Collected: 03/14/25 0038    Specimen: Arterial Blood Updated: 03/14/25 0250     Site Right Radial     Shay's Test Positive     pH, Arterial 7.245 pH units      pCO2, Arterial 71.8 mm Hg      pO2, Arterial 83.3 mm Hg      HCO3, Arterial 31.1 mmol/L      Base Excess, Arterial 1.9 mmol/L      Comment: Serial Number: [id number]:  [id number]        O2 Saturation, Arterial 93.5 %      CO2 Content 33.3 mmol/L      Barometric Pressure for Blood Gas --     Comment: N/A        Modality HFNC     FIO2 80 %      Notified Who --     Read Back --     Notified Time --     Hemodilution No     PO2/FIO2 104    High Sensitivity Troponin T 1Hr [[id number]]  (Normal) Collected: 03/13/25 2311    Specimen: Blood Updated: 03/13/25 2336     HS Troponin T 12 ng/L      Troponin T Numeric Delta 1 ng/L     Narrative:      High Sensitive Troponin T Reference Range:  <14.0 ng/L- Negative Female for AMI  <22.0 ng/L- Negative Male for AMI  >=14 - Abnormal Female indicating possible myocardial injury.  >=22 - Abnormal Male indicating possible myocardial injury.   Clinicians would have to utilize clinical acumen, EKG, Troponin, and serial changes to determine if it is an Acute Myocardial Infarction or myocardial injury due to an underlying chronic condition.         Blood Culture - Blood, Arm, Left [[id number]] Collected: 03/13/25 2137    Specimen: Blood from Arm, Left Updated: 03/13/25 2319    Respiratory Panel PCR w/COVID-19(SARS-CoV-2) LOUIE/GIOVANNY/TOMMY/PAD/COR/JANET In-House, NP Swab in UTM/VT, 2 HR TAT - Swab, Nasopharynx [[id number]]  (Abnormal) Collected: 03/13/25 2137    Specimen: Swab from Nasopharynx Updated: 03/13/25 2256     ADENOVIRUS, PCR Not Detected      Coronavirus 229E Not Detected     Coronavirus HKU1 Detected     Coronavirus NL63 Not Detected     Coronavirus OC43 Not Detected     COVID19 Not Detected     Human Metapneumovirus Not Detected     Human Rhinovirus/Enterovirus Not Detected     Influenza A PCR Not Detected     Influenza B PCR Not Detected     Parainfluenza Virus 1 Not Detected     Parainfluenza Virus 2 Not Detected     Parainfluenza Virus 3 Not Detected     Parainfluenza Virus 4 Not Detected     RSV, PCR Not Detected     Bordetella pertussis pcr Not Detected     Bordetella parapertussis PCR Not Detected     Chlamydophila pneumoniae PCR Not Detected     Mycoplasma pneumo by PCR Not Detected    Narrative:      In the setting of a positive respiratory panel with a viral infection PLUS a negative procalcitonin without other underlying concern for bacterial infection, consider observing off antibiotics or discontinuation of antibiotics and continue supportive care. If the respiratory panel is positive for atypical bacterial infection (Bordetella pertussis, Chlamydophila pneumoniae, or Mycoplasma pneumoniae), consider antibiotic de-escalation to target atypical bacterial infection.    Urine Drug Screen - Urine, Clean Catch [[id number]]  (Abnormal) Collected: 03/13/25 2220    Specimen: Urine, Clean Catch Updated: 03/13/25 2252     THC, Screen, Urine Negative     Phencyclidine (PCP), Urine Negative     Cocaine Screen, Urine Negative     Methamphetamine, Ur Positive     Opiate Screen Negative     Amphetamine Screen, Urine Positive     Benzodiazepine Screen, Urine Positive     Tricyclic Antidepressants Screen Negative     Methadone Screen, Urine Positive     Barbiturates Screen, Urine Negative     Oxycodone Screen, Urine Negative     Buprenorphine, Screen, Urine Negative    Narrative:      Cutoff For Drugs Screened:    Amphetamines               500 ng/ml  Barbiturates               200 ng/ml  Benzodiazepines            150 ng/ml  Cocaine                    150  ng/ml  Methadone                  200 ng/ml  Opiates                    100 ng/ml  Phencyclidine               25 ng/ml  THC                         50 ng/ml  Methamphetamine            500 ng/ml  Tricyclic Antidepressants  300 ng/ml  Oxycodone                  100 ng/ml  Buprenorphine               10 ng/ml    The normal value for all drugs tested is negative. This report includes unconfirmed screening results, with the cutoff values listed, to be used for medical treatment purposes only.  Unconfirmed results must not be used for non-medical purposes such as employment or legal testing.  Clinical consideration should be applied to any drug of abuse test, particularly when unconfirmed results are used.    All urine drugs of abuse requests without chain of custody are for medical screening purposes only.  False positives are possible.      Urinalysis With Microscopic If Indicated (No Culture) - Urine, Clean Catch [[id number]]  (Abnormal) Collected: 03/13/25 2220    Specimen: Urine, Clean Catch Updated: 03/13/25 2238     Color, UA Yellow     Appearance, UA Clear     pH, UA 5.5     Specific Gravity, UA 1.021     Glucose, UA Negative     Ketones, UA Negative     Bilirubin, UA Negative     Blood, UA Negative     Protein, UA Trace     Leuk Esterase, UA Negative     Nitrite, UA Negative     Urobilinogen, UA 1.0 E.U./dL    Narrative:      Urine microscopic not indicated.    Comprehensive Metabolic Panel [[id number]]  (Abnormal) Collected: 03/13/25 2137    Specimen: Blood Updated: 03/13/25 2209     Glucose 112 mg/dL      BUN 5 mg/dL      Creatinine 0.64 mg/dL      Sodium 137 mmol/L      Potassium 4.8 mmol/L      Chloride 101 mmol/L      CO2 30.0 mmol/L      Calcium 8.8 mg/dL      Total Protein 6.6 g/dL      Albumin 3.7 g/dL      ALT (SGPT) 48 U/L      AST (SGOT) 39 U/L      Alkaline Phosphatase 113 U/L      Total Bilirubin 0.2 mg/dL      Globulin 2.9 gm/dL      A/G Ratio 1.3 g/dL      BUN/Creatinine Ratio 7.8     Anion Gap 6.0  mmol/L      eGFR 119.1 mL/min/1.73     Narrative:      GFR Categories in Chronic Kidney Disease (CKD)      GFR Category          GFR (mL/min/1.73)    Interpretation  G1                     90 or greater         Normal or high (1)  G2                      60-89                Mild decrease (1)  G3a                   45-59                Mild to moderate decrease  G3b                   30-44                Moderate to severe decrease  G4                    15-29                Severe decrease  G5                    14 or less           Kidney failure          (1)In the absence of evidence of kidney disease, neither GFR category G1 or G2 fulfill the criteria for CKD.    eGFR calculation 2021 CKD-EPI creatinine equation, which does not include race as a factor    BNP [[id number]]  (Abnormal) Collected: 03/13/25 2137    Specimen: Blood Updated: 03/13/25 2209     proBNP 557.0 pg/mL     Narrative:      This assay is used as an aid in the diagnosis of individuals suspected of having heart failure. It can be used as an aid in the diagnosis of acute decompensated heart failure (ADHF) in patients presenting with signs and symptoms of ADHF to the emergency department (ED). In addition, NT-proBNP of <300 pg/mL indicates ADHF is not likely.    Age Range Result Interpretation  NT-proBNP Concentration (pg/mL:      <50             Positive            >450                   Gray                 300-450                    Negative             <300    50-75           Positive            >900                  Gray                300-900                  Negative            <300      >75             Positive            >1800                  Gray                300-1800                  Negative            <300    High Sensitivity Troponin T [[id number]]  (Normal) Collected: 03/13/25 2137    Specimen: Blood Updated: 03/13/25 2209     HS Troponin T 11 ng/L     Narrative:      High Sensitive Troponin T Reference Range:  <14.0 ng/L- Negative  Female for AMI  <22.0 ng/L- Negative Male for AMI  >=14 - Abnormal Female indicating possible myocardial injury.  >=22 - Abnormal Male indicating possible myocardial injury.   Clinicians would have to utilize clinical acumen, EKG, Troponin, and serial changes to determine if it is an Acute Myocardial Infarction or myocardial injury due to an underlying chronic condition.         Acetaminophen Level [[id number]]  (Normal) Collected: 03/13/25 2137    Specimen: Blood Updated: 03/13/25 2209     Acetaminophen <5.0 mcg/mL     Narrative:      Acetaminophen Therapeutic Range  5-20 ug/mL      Hours after ingestion            Toxic Value    4 Hours                           150 ug/mL    8 Hours                            70 ug/mL   12 Hours                            40 ug/mL   16 Hours                            20 ug/mL    These values apply to a single ingestion only.     Ethanol [[id number]] Collected: 03/13/25 2137    Specimen: Blood Updated: 03/13/25 2209     Ethanol % <0.010 %     Narrative:      Plasma Ethanol Clinical Symptoms:    ETOH (%)               Clinical Symptom  .01-.05              No apparent influence  .03-.12              Euphoria, Diminished judgment and attention   .09-.25              Impaired comprehension, Muscle incoordination  .18-.30              Confusion, Staggered gait, Slurred speech  .25-.40              Markedly decreased response to stimuli, unable to stand or                        walk, vomitting, sleep or stupor  .35-.50              Comatose, Anesthesia, Subnormal body temperature        Salicylate Level [[id number]]  (Normal) Collected: 03/13/25 2137    Specimen: Blood Updated: 03/13/25 2209     Salicylate 0.5 mg/dL     hCG, Quantitative, Pregnancy [[id number]] Collected: 03/13/25 2137    Specimen: Blood Updated: 03/13/25 2208     HCG Quantitative <1.00 mIU/mL     Narrative:      HCG Ranges by Gestational Age    Females - non-pregnant premenopausal   </= 1mIU/mL HCG  Females -  "postmenopausal               </= 7mIU/mL HCG    3 Weeks       5.4   -      72 mIU/mL  4 Weeks      10.2   -     708 mIU/mL  5 Weeks       217   -   8,245 mIU/mL  6 Weeks       152   -  32,177 mIU/mL  7 Weeks     4,059   - 153,767 mIU/mL  8 Weeks    31,366   - 149,094 mIU/mL  9 Weeks    59,109   - 135,901 mIU/mL  10 Weeks   44,186   - 170,409 mIU/mL  12 Weeks   27,107   - 201,615 mIU/mL  14 Weeks   24,302   -  93,646 mIU/mL  15 Weeks   12,540   -  69,747 mIU/mL  16 Weeks    8,904   -  55,332 mIU/mL  17 Weeks    8,240   -  51,793 mIU/mL  18 Weeks    9,649   -  55,271 mIU/mL      D-dimer, Quantitative [[id number]]  (Abnormal) Collected: 03/13/25 2137    Specimen: Blood Updated: 03/13/25 2157     D-Dimer, Quantitative 0.86 MCGFEU/mL     Narrative:      According to the assay 's published package insert, a normal (<0.50 MCGFEU/mL) D-dimer result in conjunction with a non-high clinical probability assessment, excludes deep vein thrombosis (DVT) and pulmonary embolism (PE) with high sensitivity.    D-dimer values increase with age and this can make VTE exclusion of an older population difficult. To address this, the [employer], based on best available evidence and recent guidelines, recommends that clinicians use age-adjusted D-dimer thresholds in patients greater than 50 years of age with: a) a low probability of PE who do not meet all Pulmonary Embolism Rule Out Criteria, or b) in those with intermediate probability of PE.   The formula for an age-adjusted D-dimer cut-off is \"age/100\".  For example, a 60 year old patient would have an age-adjusted cut-off of 0.60 MCGFEU/mL and an 80 year old 0.80 MCGFEU/mL.    CBC & Differential [[id number]]  (Abnormal) Collected: 03/13/25 2137    Specimen: Blood Updated: 03/13/25 2147    Narrative:      The following orders were created for panel order CBC & Differential.  Procedure                               Abnormality         Status                   "   ---------                               -----------         ------                     CBC Auto Differential[[id number]]        Abnormal            Final result                 Please view results for these tests on the individual orders.    CBC Auto Differential [[id number]]  (Abnormal) Collected: 03/13/25 2137    Specimen: Blood Updated: 03/13/25 2147     WBC 6.06 10*3/mm3      RBC 4.35 10*6/mm3      Hemoglobin 11.6 g/dL      Hematocrit 39.1 %      MCV 89.9 fL      MCH 26.7 pg      MCHC 29.7 g/dL      RDW 14.6 %      RDW-SD 48.1 fl      MPV 9.4 fL      Platelets 231 10*3/mm3      Neutrophil % 80.4 %      Lymphocyte % 9.1 %      Monocyte % 9.1 %      Eosinophil % 0.2 %      Basophil % 0.2 %      Immature Grans % 1.0 %      Neutrophils, Absolute 4.88 10*3/mm3      Lymphocytes, Absolute 0.55 10*3/mm3      Monocytes, Absolute 0.55 10*3/mm3      Eosinophils, Absolute 0.01 10*3/mm3      Basophils, Absolute 0.01 10*3/mm3      Immature Grans, Absolute 0.06 10*3/mm3      nRBC 0.0 /100 WBC     [place] Draw [[id number]] Collected: 03/13/25 2137    Specimen: Blood Updated: 03/13/25 2146    Narrative:      The following orders were created for panel order [place] Draw.  Procedure                               Abnormality         Status                     ---------                               -----------         ------                     Green Top (Gel)[[id number]]                                  Final result               Lavender Top[[id number]]                                     Final result               Gold Top - SST[[id number]]                                   Final result               Light Blue Top[[id number]]                                   Final result                 Please view results for these tests on the individual orders.    Green Top (Gel) [[id number]] Collected: 03/13/25 2137    Specimen: Blood Updated: 03/13/25 2146     Extra Tube Hold for add-ons.     Comment: Auto resulted.       Lavender Top  "[[id number]] Collected: 03/13/25 2137    Specimen: Blood Updated: 03/13/25 2146     Extra Tube hold for add-on     Comment: Auto resulted       Gold Top - SST [[id number]] Collected: 03/13/25 2137    Specimen: Blood Updated: 03/13/25 2146     Extra Tube Hold for add-ons.     Comment: Auto resulted.       Light Blue Top [[id number]] Collected: 03/13/25 2137    Specimen: Blood Updated: 03/13/25 2146     Extra Tube Hold for add-ons.     Comment: Auto resulted       Blood Culture - Blood, Arm, Right [[id number]] Collected: 03/13/25 2143    Specimen: Blood from Arm, Right Updated: 03/13/25 2143    POC Lactate [[id number]]  (Normal) Collected: 03/13/25 2141    Specimen: Blood Updated: 03/13/25 2143     Lactate 1.0 mmol/L      Comment: Serial Number: [id number]  [id number]               CT Angiogram Chest Pulmonary Embolism  Result Date: 3/14/2025  Impression: No evidence of pulmonary embolism. Bilateral basilar atelectasis. Electronically Signed: Sonny Pastrana MD  3/14/2025 12:02 AM EDT  Workstation ID: [id number]    CT Head Without Contrast  Result Date: 3/13/2025  Impression: Slightly more asymmetric hypoattenuation in the right cerebellum, which may posterior fossa/streak artifacts, though if patient's dizziness persists and there is concern for ischemia, recommend brain MRI. Otherwise, no acute cranial findings. Electronically Signed: Dariel Rogers  3/13/2025 10:52 PM EDT  Workstation ID: [id number]      CONSULTS   Consults       No orders found for last 30 day(s).            CONDITION ON DISCHARGE    Stable    VITAL SIGNS  /77   Pulse 83   Temp 97.4 °F (36.3 °C) (Axillary)   Resp 21   Ht 170.2 cm (67.01\")   Wt (!) 156 kg (343 lb 14.7 oz)   LMP 02/15/2025 (Approximate)   SpO2 94%   BMI 53.85 kg/m²     PHYSICAL EXAM  Vitals and nursing note reviewed.   Constitutional:       General: She is not in acute distress.     Appearance: She is not ill-appearing.   HENT:      Head: Normocephalic.      Mouth/Throat: "      Mouth: Mucous membranes are moist.      Pharynx: Oropharynx is clear.   Eyes:      Extraocular Movements: Extraocular movements intact.      Conjunctiva/sclera: Conjunctivae normal.      Pupils: Pupils are equal, round, and reactive to light.   Cardiovascular:      Rate and Rhythm: Normal rate and regular rhythm.      Pulses: Normal pulses.      Heart sounds: Normal heart sounds.   Pulmonary:      Effort: Tachypnea present.      Breath sounds: Examination of the right-middle field reveals decreased breath sounds. Examination of the left-middle field reveals decreased breath sounds. Examination of the right-lower field reveals decreased breath sounds. Examination of the left-lower field reveals decreased breath sounds. Decreased breath sounds present.   Abdominal:      General: Bowel sounds are normal.      Palpations: Abdomen is soft.   Musculoskeletal:      Right lower leg: No edema.      Left lower leg: No edema.   Skin:     General: Skin is warm and dry.      Findings: No rash.      Comments: Bruising on left upper thigh and knee due to failed injections of meth   Neurological:      General: No focal deficit present.      Mental Status: She is alert and oriented to person, place, and time.   Psychiatric:         Mood and Affect: Mood is anxious.         Behavior: Behavior is uncooperative and agitated.         Thought Content: Thought content does not include suicidal ideation. Thought content does not include suicidal plan.        DISCHARGE DISPOSITION  Left Against Medical Advice    DISCHARGE MEDICATIONS     Discharge Medications        Continue These Medications        Instructions Start Date   albuterol sulfate  (90 Base) MCG/ACT inhaler  Commonly known as: PROVENTIL HFA;VENTOLIN HFA;PROAIR HFA   2 puffs, Inhalation, Every 4 Hours PRN      Brexpiprazole 2 MG tablet   2 mg, Daily      FLUoxetine 40 MG capsule  Commonly known as: PROzac   40 mg, Daily      gabapentin 100 MG capsule  Commonly known  as: NEURONTIN   100 mg, 3 Times Daily      ipratropium-albuterol 0.5-2.5 mg/3 ml nebulizer  Commonly known as: DUO-NEB   3 mL, Nebulization, 4 Times Daily - RT      lamoTRIgine 25 MG tablet  Commonly known as: LaMICtal   25 mg, Oral, 2 Times Daily      methadone 5 MG tablet  Commonly known as: DOLOPHINE   177 mg, Daily      methocarbamol 750 MG tablet  Commonly known as: ROBAXIN   750 mg, 3 Times Daily      montelukast 10 MG tablet  Commonly known as: SINGULAIR   10 mg, Oral, Nightly      naloxone 4 MG/0.1ML nasal spray  Commonly known as: NARCAN   Call 911. Don't prime. [place] in 1 nostril for overdose. Repeat in 2-3 minutes in other nostril if no or minimal breathing/responsiveness.      promethazine 25 MG tablet  Commonly known as: PHENERGAN   25 mg, Oral, Every 6 Hours PRN      vitamin D 1.25 MG (83090 UT) capsule capsule  Commonly known as: ERGOCALCIFEROL   50,000 Units, Weekly             ASK your doctor about these medications        Instructions Start Date   hydrOXYzine 25 MG tablet  Commonly known as: ATARAX  Ask about: Which instructions should I use?   25 mg, 3 Times Daily PRN                 DISCHARGE DIET   As tolerated    ACTIVITY AT DISCHARGE   As tolerated      FOLLOW-UP APPOINTMENTS  No future appointments.        TEST RESULTS PENDING AT DISCHARGE  Pending Results       Procedure [Order ID] Specimen - Date/Time    Blood Culture - Blood, Arm, Left [[id number]] Collected: 03/13/25 2137    Specimen: Blood from Arm, Left Updated: 03/13/25 2319    Blood Culture - Blood, Arm, Right [[id number]] Collected: 03/13/25 2143    Specimen: Blood from Arm, Right Updated: 03/13/25 2143    Lipid Panel [[id number]]     Specimen: Blood     MRSA Screen, PCR (Inpatient) - Swab, Nares [[id number]]     Specimen: Swab from Nares               This note scribed by me for Dr. Martinez.     Samantha Dupree, APRN  3/14/2025     Electronically signed by Tom Martinez MD at 03/14/25 0807

## 2025-03-14 NOTE — NURSING NOTE
Pt adamant about getting OOB. Pt has been educated per multiple staff members risk v benefits of getting OOB. Educated pt that her O2 was extremely low and she is requiring high amounts of oxygen at this time. Advised pt that it is not safe for her to get up at this time. Pt verbalized the desire to leave AMA. Pt was again educated on risk v benefits of leaving AMA with pt verbalizing understanding. Pt informed that risks of leaving AMA include death with pt continuing to verbalize understanding. JOANNE Dupree notified. EARLENE notified of pt leaving AMA. Bilateral IV's removed prior to pt's leaving AMA.

## 2025-03-14 NOTE — NURSING NOTE
Pt non-compliant with instruction at this time. Pt has been educated on why she is currently NPO but continues to ask multiple staff members for food & drink. Pt transitioned from bipap to Airvo, advised pt she remains on bedrest at this time. Advised unable to ambulate until assessed per PT. Pt continues to ask multiple staff members to get her OOB. Education continues to be reinforced.

## 2025-03-14 NOTE — ED NOTES
Patient reports having headache all day.  States she was seen at urgent care earlier and given unknown medication.  Patient reports relapsing on meth 5 days ago.  Patient has redness to area on R arm, and multiple bruising to legs from missed injections.

## 2025-03-14 NOTE — H&P
"Critical Care History and Physical     Jacqueline Turcios : 1990 MRN:[id number] LOS:0 ROOM:      Reason for admission: Acute respiratory failure with hypoxia     Assessment / Plan     Acute respiratory failure with hypoxia  CT PE negative  CT PE showed bilateral basilar atelectasis  Echo 2025 EF 65% LV diastolic function consistent with grade 1 impaired relaxation, RVSP normal, no significant valvular abnormalities noted  Currently on BiPAP will transition to Airvo  ABG results noted    Urine drug screen positive amphetamines, benzos, methamphetamine, methadone  Patient has been on methadone treatment  Patient has phentermine prescription last dispensed 2025 unsure if patient currently taking  UDS collected prior to benzo given in ER    Anxiety/depression  Patient had been on Prozac, Lamictal, and Rexulti  Patient reports she stopped taking these medications 2 weeks ago    Leaving AGAINST MEDICAL ADVICE  Patient educated on risk of leaving  Patient verbalizes understanding of risk    Code Status (Patient has no pulse and is not breathing): CPR (Attempt to Resuscitate)  Medical Interventions (Patient has pulse or is breathing): Full Support       Nutrition:   NPO Diet NPO Type: Strict NPO     VTE Prophylaxis:  Mechanical VTE prophylaxis orders are present.         History of Present illness     Jacqueline Turcios is a 34 y.o. female with PMH of +rhinovirus 2 months ago, history of drug abuse, depression, anxiety, hepatitis C, presented to the hospital for shortness of breath. The patient stated that she has felt short of breath since she was diagnosed with rhinovirus in January of this year. The patient reports that two weeks ago she stopped taking her antidepressants. She did not have a reason for stopping her medications. She reports that 5 days ago she relapsed on meth. She has several areas on her legs where she reports she missed the vein when trying to inject the meth. She states that she \"Just " "started hating herself again,\" which is why she relapsed. She states she has not used in 2 days. Patient denies any suicide ideations.     In ER the patient's oxygen saturations were found to be in the 60s on room air and she was placed on bipap. Once patient was admitted to ICU, she became agitated with being limited to what she could due to her hypoxia. A new ABG was obtained and patient was placed on an airvo in hopes to make the patient more comfortable. The patient stated that she wanted to leave AMA. The patient was educated multiple times on the risks of leaving AMA. The patient verified she understood and stated she still wanted to leave AMA. Patient was provided paper worked and left AMA with her  significant other.       ACP: CPR, Full Intervention. No ACP docs on file. Patients mother is listed as her NOK to make decisions for her in the event she is unable.     Patient was seen and examined on 25 at 01:08 EDT .      Past Medical/Surgical/Social/Family History & Allergies     Past Medical History:   Diagnosis Date    Asthma     Kidney stone       Past Surgical History:   Procedure Laterality Date     SECTION      CHOLECYSTECTOMY      DILATION AND CURETTAGE, DIAGNOSTIC / THERAPEUTIC      EXCISION LESION N/A 2024    Procedure: EXCISION of cyst from psterior neck, prone position;  Surgeon: Raul Lawson MD;  Location: [facility] OR;  Service: General;  Laterality: N/A;    URETERAL STENT INSERTION        Social History     Socioeconomic History    Marital status: Single   Tobacco Use    Smoking status: Every Day     Current packs/day: 1.00     Average packs/day: 1 pack/day for 12.2 years (12.2 ttl pk-yrs)     Types: Cigarettes     Start date:      Passive exposure: Current    Smokeless tobacco: Never   Vaping Use    Vaping status: Never Used   Substance and Sexual Activity    Alcohol use: No    Drug use: Never    Sexual activity: Defer      No family history on file. "   Allergies   Allergen Reactions    Penicillins Hives     Beta lactam allergy details  Antibiotic reaction: other (throat closed)  Age at reaction: infant  Dose to reaction time: (!) hours  Reason for antibiotic: unknown  Epinephrine required for reaction?: unknown  Tolerated antibiotics: amoxicillin, cephalexin, cefepime          Social Drivers of Health     Tobacco Use: High Risk (1/30/2025)    Patient History     Smoking Tobacco Use: Every Day     Smokeless Tobacco Use: Never     Passive Exposure: Current   Alcohol Use: Not At Risk (1/31/2025)    AUDIT-C     Frequency of Alcohol Consumption: Never     Average Number of Drinks: Patient does not drink     Frequency of Binge Drinking: Never   Financial Resource Strain: Low Risk  (1/31/2025)    Overall Financial Resource Strain (CARDIA)     Difficulty of Paying Living Expenses: Not very hard   Food Insecurity: No Food Insecurity (2/5/2025)    Hunger Vital Sign     Worried About Running Out of Food in the Last Year: Never true     Ran Out of Food in the Last Year: Never true   Transportation Needs: No Transportation Needs (2/5/2025)    PRAPARE - Transportation     Lack of Transportation (Medical): No     Lack of Transportation (Non-Medical): No   Physical Activity: Sufficiently Active (1/31/2025)    Exercise Vital Sign     Days of Exercise per Week: 7 days     Minutes of Exercise per Session: 60 min   Stress: No Stress Concern Present (1/31/2025)    [place] [employer] - Occupational Stress Questionnaire     Feeling of Stress : Not at all   Social Connections: Not At Risk (1/31/2025)    Family and Community Support     Help with Day-to-Day Activities: I don't need any help     Lonely or Isolated: Never   Interpersonal Safety: Not At Risk (3/13/2025)    Abuse Screen     Unsafe at Home or Work/School: no     Feels Threatened by Someone?: no     Does Anyone Keep You from Contacting Others or Doint Things Outside the Home?: no     Physical Sign of  Abuse Present: no   Depression: Not at risk (1/31/2025)    PHQ-2     PHQ-2 Score: 0   Housing Stability: Not At Risk (2/5/2025)    Housing Stability     Current Living Arrangements: home     Potentially Unsafe Housing Conditions: none   Utilities: Not At Risk (2/5/2025)    [facility] Utilities     Threatened with loss of utilities: No   Health Literacy: Not At Risk (2/5/2025)    Education     Help with school or training?: No     Preferred Language: English   Employment: Not At Risk (1/31/2025)    Employment     Do you want help finding or keeping work or a job?: I do not need or want help   Disabilities: Not At Risk (1/31/2025)    Disabilities     Concentrating, Remembering, or Making Decisions Difficulty: no     Doing Errands Independently Difficulty: no        Home Medications     Prior to Admission medications    Medication Sig Start Date End Date Taking? Authorizing Provider   albuterol sulfate  (90 Base) MCG/ACT inhaler Inhale 2 puffs Every 4 (Four) Hours As Needed for Wheezing. 2/2/25   Marlene Etienne MD   Brexpiprazole 2 MG tablet Take 1 tablet by mouth Daily.    ProviderGiovani MD   FLUoxetine (PROzac) 20 MG capsule Take 2 capsules by mouth Daily.    ProviderGiovani MD   gabapentin (NEURONTIN) 100 MG capsule Take 1 capsule by mouth 3 (Three) Times a Day.    Giovani Echevarria MD   hydrOXYzine (ATARAX) 25 MG tablet Take 2 tablets by mouth Every 6 (Six) Hours As Needed for Anxiety.  Patient taking differently: Take 1 tablet by mouth 3 (Three) Times a Day As Needed for Anxiety. 11/14/24   Scarlet Mott PA   ipratropium-albuterol (DUO-NEB) 0.5-2.5 mg/3 ml nebulizer Take 3 mL by nebulization 4 (Four) Times a Day. 1/21/25   Sita East MD   lamoTRIgine (LaMICtal) 25 MG tablet Take 1 tablet by mouth 2 (Two) Times a Day.    Giovani Echevarria MD   methadone (DOLOPHINE) 5 MG tablet Take 177 mg by mouth Daily.    Giovani Echevarria MD   methocarbamol (ROBAXIN) 750 MG tablet Take 1  tablet by mouth 3 (Three) Times a Day.    ProviderGiovani MD   methylPREDNISolone (MEDROL) 4 MG dose pack Take as directed on package instructions. 2/5/25   Jenni Hassan PA-C   montelukast (SINGULAIR) 10 MG tablet Take 1 tablet by mouth Every Night.    Giovani Echevarria MD   naloxone (NARCAN) 4 MG/0.1ML nasal spray Call 911. Don't prime. [place] in 1 nostril for overdose. Repeat in 2-3 minutes in other nostril if no or minimal breathing/responsiveness. 4/16/24   Raul Lawson MD   predniSONE (DELTASONE) 10 MG (48) dose pack Use as directed 2/2/25   Marlene Etienne MD   promethazine (PHENERGAN) 25 MG tablet Take 1 tablet by mouth Every 6 (Six) Hours As Needed for Nausea or Vomiting. 3/13/25   Keny Borjas MD   vitamin D (ERGOCALCIFEROL) 1.25 MG (05961 UT) capsule capsule Take 1 capsule by mouth 1 (One) Time Per Week.    ProviderGiovani MD        Objective / Physical Exam     Vital signs:  Temp: (!) 101.4 °F (38.6 °C)  BP: 91/58  Heart Rate: 89  Resp: 20  SpO2: 96 %  Weight: (!) 155 kg (341 lb 14.9 oz)    Admission Weight: Weight: (!) 155 kg (341 lb 14.9 oz)    Physical Exam  Vitals and nursing note reviewed.   Constitutional:       General: She is not in acute distress.     Appearance: She is not ill-appearing.   HENT:      Head: Normocephalic.      Mouth/Throat:      Mouth: Mucous membranes are moist.      Pharynx: Oropharynx is clear.   Eyes:      Extraocular Movements: Extraocular movements intact.      Conjunctiva/sclera: Conjunctivae normal.      Pupils: Pupils are equal, round, and reactive to light.   Cardiovascular:      Rate and Rhythm: Normal rate and regular rhythm.      Pulses: Normal pulses.      Heart sounds: Normal heart sounds.   Pulmonary:      Effort: Tachypnea present.      Breath sounds: Examination of the right-middle field reveals decreased breath sounds. Examination of the left-middle field reveals decreased breath sounds. Examination of the right-lower field  reveals decreased breath sounds. Examination of the left-lower field reveals decreased breath sounds. Decreased breath sounds present.   Abdominal:      General: Bowel sounds are normal.      Palpations: Abdomen is soft.   Musculoskeletal:      Right lower leg: No edema.      Left lower leg: No edema.   Skin:     General: Skin is warm and dry.      Findings: No rash.      Comments: Bruising on left upper thigh and knee due to failed injections of meth   Neurological:      General: No focal deficit present.      Mental Status: She is alert and oriented to person, place, and time.   Psychiatric:         Mood and Affect: Mood is anxious.         Behavior: Behavior is uncooperative and agitated.         Thought Content: Thought content does not include suicidal ideation. Thought content does not include suicidal plan.          Labs     Results from last 7 days   Lab Units 03/13/25  2137   WBC 10*3/mm3 6.06   HEMOGLOBIN g/dL 11.6*   HEMATOCRIT % 39.1   PLATELETS 10*3/mm3 231      Results from last 7 days   Lab Units 03/13/25  2137   SODIUM mmol/L 137   POTASSIUM mmol/L 4.8   CHLORIDE mmol/L 101   CO2 mmol/L 30.0*   ANION GAP mmol/L 6.0   BUN mg/dL 5*   CREATININE mg/dL 0.64   GLUCOSE mg/dL 112*   ALT (SGPT) U/L 48*   AST (SGOT) U/L 39*   ALK PHOS U/L 113            Imaging     CT Angiogram Chest Pulmonary Embolism  Result Date: 3/14/2025  Impression: No evidence of pulmonary embolism. Bilateral basilar atelectasis. Electronically Signed: Sonny Pastrana MD  3/14/2025 12:02 AM EDT  Workstation ID: [id number]    CT Head Without Contrast  Result Date: 3/13/2025  Impression: Slightly more asymmetric hypoattenuation in the right cerebellum, which may posterior fossa/streak artifacts, though if patient's dizziness persists and there is concern for ischemia, recommend brain MRI. Otherwise, no acute cranial findings. Electronically Signed: Dariel Rogers  3/13/2025 10:52 PM EDT  Workstation ID: [id number]    Current Medications      Scheduled Meds:  LORazepam, 1 mg, Intravenous, Once  mupirocin, 1 Application, Each Nare, BID  sodium chloride, 10 mL, Intravenous, Q12H  vancomycin, 2,000 mg, Intravenous, Once         Continuous Infusions:          JOANNE Guerrero   Critical Care  03/14/25   01:08 EDT

## 2025-03-14 NOTE — ED NOTES
Nursing report ED to floor  Jacqueline Turcios  34 y.o.  female    HPI:   Chief Complaint   Patient presents with    Headache       Admitting doctor:   Tom Martinez MD    Admitting diagnosis:   The primary encounter diagnosis was Hypoxia. Diagnoses of History of methamphetamine abuse, Intractable headache, unspecified chronicity pattern, unspecified headache type, Coronavirus infection, Mild bibasilar atelectasis, Positive urine drug screen, and Bacterial skin infection were also pertinent to this visit.    Code status:   Current Code Status       Date Active Code Status Order ID Comments User Context       3/14/2025 0103 CPR (Attempt to Resuscitate) [id number]  Samantha Dupree APRN ED        Question Answer    Code Status (Patient has no pulse and is not breathing) CPR (Attempt to Resuscitate)    Medical Interventions (Patient has pulse or is breathing) Full Support                    Allergies:   Penicillins    Isolation:  No active isolations     Fall Risk:  Fall Risk Assessment was completed, and patient is at low risk for falls.   Predictive Model Details         3 (Low) Factor Value    Calculated 3/14/2025 01:19 Age 34    Risk of Fall Model Active Peripheral IV Present     Imaging order in this encounter Present     Number of Distinct Medication Classes administered 8     Respiratory Rate 20     Diastolic BP 58     Magnesium not on file     Tobacco Use Current     David Scale not on file     Number of administrations of Anti-Rheumatics 1     Calcium 8.8 mg/dL     Albumin 3.7 g/dL     ALT 48 U/L     Chloride 101 mmol/L     Potassium 4.8 mmol/L     Creatinine 0.64 mg/dL     Duration of Current Encounter 0.168 days     Total Bilirubin 0.2 mg/dL         Weight:       03/13/25  2108   Weight: (!) 155 kg (341 lb 14.9 oz)       Intake and Output  No intake or output data in the 24 hours ending 03/14/25 0121    Diet:   Dietary Orders (From admission, onward)       Start     Ordered    03/14/25 0104  NPO Diet NPO  Type: Strict NPO  Diet Effective Now        Question:  NPO Type  Answer:  Strict NPO    03/14/25 0103                     Most recent vitals:   Vitals:    03/14/25 0000 03/14/25 0017 03/14/25 0032 03/14/25 0119   BP: 105/62 108/68 91/58    BP Location:       Patient Position:       Pulse: 86 84 89 79   Resp:       Temp:       TempSrc:       SpO2: 95% 96% 96% 99%   Weight:       Height:           Active LDAs/IV Access:   Lines, Drains & Airways       Active LDAs       Name Placement date Placement time Site Days    Peripheral IV 03/13/25 2150 Anterior;Distal;Right;Upper Arm 03/13/25 2150  Arm  less than 1    Peripheral IV 03/13/25 2321 Left Antecubital 03/13/25 2321  Antecubital  less than 1                    Skin Condition:   Skin Assessments (last day)       None             Labs (abnormal labs have a star):   Labs Reviewed   RESPIRATORY PANEL PCR W/ COVID-19 (SARS-COV-2), NP SWAB IN UTM/VTP, 2 HR TAT - Abnormal; Notable for the following components:       Result Value    Coronavirus HKU1 Detected (*)     All other components within normal limits    Narrative:     In the setting of a positive respiratory panel with a viral infection PLUS a negative procalcitonin without other underlying concern for bacterial infection, consider observing off antibiotics or discontinuation of antibiotics and continue supportive care. If the respiratory panel is positive for atypical bacterial infection (Bordetella pertussis, Chlamydophila pneumoniae, or Mycoplasma pneumoniae), consider antibiotic de-escalation to target atypical bacterial infection.   COMPREHENSIVE METABOLIC PANEL - Abnormal; Notable for the following components:    Glucose 112 (*)     BUN 5 (*)     CO2 30.0 (*)     ALT (SGPT) 48 (*)     AST (SGOT) 39 (*)     All other components within normal limits    Narrative:     GFR Categories in Chronic Kidney Disease (CKD)      GFR Category          GFR (mL/min/1.73)    Interpretation  G1                     90 or  greater         Normal or high (1)  G2                      60-89                Mild decrease (1)  G3a                   45-59                Mild to moderate decrease  G3b                   30-44                Moderate to severe decrease  G4                    15-29                Severe decrease  G5                    14 or less           Kidney failure          (1)In the absence of evidence of kidney disease, neither GFR category G1 or G2 fulfill the criteria for CKD.    eGFR calculation 2021 CKD-EPI creatinine equation, which does not include race as a factor   URINALYSIS W/ MICROSCOPIC IF INDICATED (NO CULTURE) - Abnormal; Notable for the following components:    Protein, UA Trace (*)     All other components within normal limits    Narrative:     Urine microscopic not indicated.   BNP (IN-HOUSE) - Abnormal; Notable for the following components:    proBNP 557.0 (*)     All other components within normal limits    Narrative:     This assay is used as an aid in the diagnosis of individuals suspected of having heart failure. It can be used as an aid in the diagnosis of acute decompensated heart failure (ADHF) in patients presenting with signs and symptoms of ADHF to the emergency department (ED). In addition, NT-proBNP of <300 pg/mL indicates ADHF is not likely.    Age Range Result Interpretation  NT-proBNP Concentration (pg/mL:      <50             Positive            >450                   Gray                 300-450                    Negative             <300    50-75           Positive            >900                  Gray                300-900                  Negative            <300      >75             Positive            >1800                  Gray                300-1800                  Negative            <300   D-DIMER, QUANTITATIVE - Abnormal; Notable for the following components:    D-Dimer, Quantitative 0.86 (*)     All other components within normal limits    Narrative:     According to the  "assay 's published package insert, a normal (<0.50 MCGFEU/mL) D-dimer result in conjunction with a non-high clinical probability assessment, excludes deep vein thrombosis (DVT) and pulmonary embolism (PE) with high sensitivity.    D-dimer values increase with age and this can make VTE exclusion of an older population difficult. To address this, the [employer], based on best available evidence and recent guidelines, recommends that clinicians use age-adjusted D-dimer thresholds in patients greater than 50 years of age with: a) a low probability of PE who do not meet all Pulmonary Embolism Rule Out Criteria, or b) in those with intermediate probability of PE.   The formula for an age-adjusted D-dimer cut-off is \"age/100\".  For example, a 60 year old patient would have an age-adjusted cut-off of 0.60 MCGFEU/mL and an 80 year old 0.80 MCGFEU/mL.   CBC WITH AUTO DIFFERENTIAL - Abnormal; Notable for the following components:    Hemoglobin 11.6 (*)     MCHC 29.7 (*)     Neutrophil % 80.4 (*)     Lymphocyte % 9.1 (*)     Eosinophil % 0.2 (*)     Immature Grans % 1.0 (*)     Lymphocytes, Absolute 0.55 (*)     Immature Grans, Absolute 0.06 (*)     All other components within normal limits   URINE DRUG SCREEN - Abnormal; Notable for the following components:    Methamphetamine, Ur Positive (*)     Amphetamine Screen, Urine Positive (*)     Benzodiazepine Screen, Urine Positive (*)     Methadone Screen, Urine Positive (*)     All other components within normal limits    Narrative:     Cutoff For Drugs Screened:    Amphetamines               500 ng/ml  Barbiturates               200 ng/ml  Benzodiazepines            150 ng/ml  Cocaine                    150 ng/ml  Methadone                  200 ng/ml  Opiates                    100 ng/ml  Phencyclidine               25 ng/ml  THC                         50 ng/ml  Methamphetamine            500 ng/ml  Tricyclic Antidepressants  300 ng/ml  Oxycodone "                  100 ng/ml  Buprenorphine               10 ng/ml    The normal value for all drugs tested is negative. This report includes unconfirmed screening results, with the cutoff values listed, to be used for medical treatment purposes only.  Unconfirmed results must not be used for non-medical purposes such as employment or legal testing.  Clinical consideration should be applied to any drug of abuse test, particularly when unconfirmed results are used.    All urine drugs of abuse requests without chain of custody are for medical screening purposes only.  False positives are possible.     TROPONIN - Normal    Narrative:     High Sensitive Troponin T Reference Range:  <14.0 ng/L- Negative Female for AMI  <22.0 ng/L- Negative Male for AMI  >=14 - Abnormal Female indicating possible myocardial injury.  >=22 - Abnormal Male indicating possible myocardial injury.   Clinicians would have to utilize clinical acumen, EKG, Troponin, and serial changes to determine if it is an Acute Myocardial Infarction or myocardial injury due to an underlying chronic condition.        ACETAMINOPHEN LEVEL - Normal    Narrative:     Acetaminophen Therapeutic Range  5-20 ug/mL      Hours after ingestion            Toxic Value    4 Hours                           150 ug/mL    8 Hours                            70 ug/mL   12 Hours                            40 ug/mL   16 Hours                            20 ug/mL    These values apply to a single ingestion only.    SALICYLATE LEVEL - Normal   HIGH SENSITIVITIY TROPONIN T 1HR - Normal    Narrative:     High Sensitive Troponin T Reference Range:  <14.0 ng/L- Negative Female for AMI  <22.0 ng/L- Negative Male for AMI  >=14 - Abnormal Female indicating possible myocardial injury.  >=22 - Abnormal Male indicating possible myocardial injury.   Clinicians would have to utilize clinical acumen, EKG, Troponin, and serial changes to determine if it is an Acute Myocardial Infarction or myocardial  injury due to an underlying chronic condition.        POC LACTATE - Normal   BLOOD CULTURE   BLOOD CULTURE   MRSA SCREEN, PCR   HCG, QUANTITATIVE, PREGNANCY    Narrative:     HCG Ranges by Gestational Age    Females - non-pregnant premenopausal   </= 1mIU/mL HCG  Females - postmenopausal               </= 7mIU/mL HCG    3 Weeks       5.4   -      72 mIU/mL  4 Weeks      10.2   -     708 mIU/mL  5 Weeks       217   -   8,245 mIU/mL  6 Weeks       152   -  32,177 mIU/mL  7 Weeks     4,059   - 153,767 mIU/mL  8 Weeks    31,366   - 149,094 mIU/mL  9 Weeks    59,109   - 135,901 mIU/mL  10 Weeks   44,186   - 170,409 mIU/mL  12 Weeks   27,107   - 201,615 mIU/mL  14 Weeks   24,302   -  93,646 mIU/mL  15 Weeks   12,540   -  69,747 mIU/mL  16 Weeks    8,904   -  55,332 mIU/mL  17 Weeks    8,240   -  51,793 mIU/mL  18 Weeks    9,649   -  55,271 mIU/mL     RAINBOW DRAW    Narrative:     The following orders were created for panel order [place] Draw.  Procedure                               Abnormality         Status                     ---------                               -----------         ------                     Green Top (Gel)[[id number]]                                  Final result               Lavender Top[[id number]]                                     Final result               Gold Top - SST[[id number]]                                   Final result               Light Blue Top[[id number]]                                   Final result                 Please view results for these tests on the individual orders.   ETHANOL    Narrative:     Plasma Ethanol Clinical Symptoms:    ETOH (%)               Clinical Symptom  .01-.05              No apparent influence  .03-.12              Euphoria, Diminished judgment and attention   .09-.25              Impaired comprehension, Muscle incoordination  .18-.30              Confusion, Staggered gait, Slurred speech  .25-.40              Markedly decreased response to stimuli,  unable to stand or                        walk, vomitting, sleep or stupor  .35-.50              Comatose, Anesthesia, Subnormal body temperature       BLOOD GAS, ARTERIAL   LIPID PANEL   CBC AND DIFFERENTIAL    Narrative:     The following orders were created for panel order CBC & Differential.  Procedure                               Abnormality         Status                     ---------                               -----------         ------                     CBC Auto Differential[[id number]]        Abnormal            Final result                 Please view results for these tests on the individual orders.   GREEN TOP   LAVENDER TOP   GOLD TOP - [facility]   LIGHT BLUE TOP       LOC: Person, Place, Time, and Situation    Telemetry:  Critical Care    Cardiac Monitoring Ordered: yes    EKG:   ECG 12 Lead Dyspnea   Preliminary Result   HEART VCTI=685  bpm   RR Vhyuhstu=699  ms   OK Tguzswyf=214  ms   P Horizontal Axis=-11  deg   P Front Axis=51  deg   QRSD Interval=67  ms   QT Bkbhfmlp=093  ms   WUsW=339  ms   QRS Axis=45  deg   T Wave Axis=52  deg   - BORDERLINE ECG -   Sinus tachycardia   Probable left atrial enlargement   Date and Time of Study:2025-03-13 21:46:41          Medications Given in the ED:   Medications   sodium chloride 0.9 % flush 10 mL (has no administration in time range)   nitroglycerin (NITROSTAT) SL tablet 0.4 mg (has no administration in time range)   sodium chloride 0.9 % flush 10 mL (10 mL Intravenous Given 3/14/25 0111)   sodium chloride 0.9 % flush 10 mL (has no administration in time range)   sodium chloride 0.9 % infusion 40 mL (has no administration in time range)   mupirocin (BACTROBAN) 2 % nasal ointment 1 Application (has no administration in time range)   ipratropium-albuterol (DUO-NEB) nebulizer solution 3 mL (has no administration in time range)   aluminum-magnesium hydroxide-simethicone (MAALOX MAX) 400-400-40 MG/5ML suspension 15 mL (has no administration in time range)    sennosides-docusate (PERICOLACE) 8.6-50 MG per tablet 2 tablet (has no administration in time range)     And   polyethylene glycol (MIRALAX) packet 17 g (has no administration in time range)     And   bisacodyl (DULCOLAX) EC tablet 5 mg (has no administration in time range)     And   bisacodyl (DULCOLAX) suppository 10 mg (has no administration in time range)   acetaminophen (TYLENOL) tablet 650 mg (has no administration in time range)     Or   acetaminophen (TYLENOL) suppository 650 mg (has no administration in time range)   ondansetron ODT (ZOFRAN-ODT) disintegrating tablet 4 mg (has no administration in time range)     Or   ondansetron (ZOFRAN) injection 4 mg (has no administration in time range)   ketorolac (TORADOL) injection 15 mg (15 mg Intravenous Given 3/13/25 2205)   dexAMETHasone (DECADRON) injection 10 mg (10 mg Intravenous Given 3/13/25 2207)   aztreonam (AZACTAM) 2 g in sodium chloride 0.9 % 100 mL MBP (0 g Intravenous Stopped 3/13/25 2315)   metroNIDAZOLE (FLAGYL) IVPB 500 mg (0 mg Intravenous Stopped 3/13/25 2344)   vancomycin IVPB 2000 mg in 0.9% Sodium Chloride 500 mL (2,000 mg Intravenous New Bag 3/13/25 2314)   iopamidol (ISOVUE-370) 76 % injection 100 mL (100 mL Intravenous Given 3/13/25 2344)   LORazepam (ATIVAN) injection 1 mg (1 mg Intravenous Given 3/14/25 0111)       Imaging results:  CT Angiogram Chest Pulmonary Embolism  Result Date: 3/14/2025  Impression: No evidence of pulmonary embolism. Bilateral basilar atelectasis. Electronically Signed: Sonny Pastrana MD  3/14/2025 12:02 AM EDT  Workstation ID: [id number]    CT Head Without Contrast  Result Date: 3/13/2025  Impression: Slightly more asymmetric hypoattenuation in the right cerebellum, which may posterior fossa/streak artifacts, though if patient's dizziness persists and there is concern for ischemia, recommend brain MRI. Otherwise, no acute cranial findings. Electronically Signed: Dariel Rogers  3/13/2025 10:52 PM EDT  Workstation  ID: [id number]      Social issues:   Social History     Socioeconomic History    Marital status: Single   Tobacco Use    Smoking status: Every Day     Current packs/day: 1.00     Average packs/day: 1 pack/day for 12.2 years (12.2 ttl pk-yrs)     Types: Cigarettes     Start date: 2013     Passive exposure: Current    Smokeless tobacco: Never   Vaping Use    Vaping status: Never Used   Substance and Sexual Activity    Alcohol use: No    Drug use: Never    Sexual activity: Defer       NIH Stroke Scale:  Interval: (not recorded)  1a. Level of Consciousness: (not recorded)  1b. LOC Questions: (not recorded)  1c. LOC Commands: (not recorded)  2. Best Gaze: (not recorded)  3. Visual: (not recorded)  4. Facial Palsy: (not recorded)  5a. Motor Arm, Left: (not recorded)  5b. Motor Arm, Right: (not recorded)  6a. Motor Leg, Left: (not recorded)  6b. Motor Leg, Right: (not recorded)  7. Limb Ataxia: (not recorded)  8. Sensory: (not recorded)  9. Best Language: (not recorded)  10. Dysarthria: (not recorded)  11. Extinction and Inattention (formerly Neglect): (not recorded)    Total (NIH Stroke Scale): (not recorded)     Additional notable assessment information:NA     Nursing report ED to floor:  JACKY Cox RN   03/14/25 01:21 EDT

## 2025-03-14 NOTE — DISCHARGE SUMMARY
"CRITICAL CARE DISCHARGE SUMMARY           PATIENT NAME:  Jacqueline Turcios             :  1990            MRN:  [id number]    DATE OF ADMISSION: 3/13/2025     DATE OF DISCHARGE: 3/14/2025    DISCHARGE DIAGNOSES:   Acute respiratory failure with hypoxia      HOSPITAL COURSE  Jacqueline Turcios is a 34 y.o. female with PMH of +rhinovirus 2 months ago, history of drug abuse, depression, anxiety, hepatitis C, presented to the hospital for shortness of breath. The patient stated that she has felt short of breath since she was diagnosed with rhinovirus in January of this year. The patient reports that two weeks ago she stopped taking her antidepressants. She did not have a reason for stopping her medications. She reports that 5 days ago she relapsed on meth. She has several areas on her legs where she reports she missed the vein when trying to inject the meth. She states that she \"Just started hating herself again,\" which is why she relapsed. She states she has not used in 2 days. Patient denies any suicide ideations.      In ER the patient's oxygen saturations were found to be in the 60s on room air and she was placed on bipap. Once patient was admitted to ICU, she became agitated with being limited to what she could due to her hypoxia. A new ABG was obtained and patient was placed on an airvo in hopes to make the patient more comfortable. The patient stated that she wanted to leave AMA. The patient was educated multiple times on the risks of leaving AMA. The patient verified she understood and stated she still wanted to leave AMA. Patient was provided paper worked and left AMA with her  significant other.   PROCEDURES PERFORMED         LABS/RADIOLOGICAL STUDIES  Lab Results (last 72 hours)       Procedure Component Value Units Date/Time    Blood Gas, Arterial - [[id number]]  (Abnormal) Collected: 25    Specimen: Arterial Blood Updated: 25     Site Right Radial     Shay's Test Positive     pH, " Arterial 7.313 pH units      pCO2, Arterial 61.0 mm Hg      pO2, Arterial 163.8 mm Hg      HCO3, Arterial 30.9 mmol/L      Base Excess, Arterial 3.2 mmol/L      Comment: Serial Number: [id number]:  [id number]        O2 Saturation, Arterial 99.3 %      CO2 Content 32.8 mmol/L      Barometric Pressure for Blood Gas --     Comment: N/A        Modality BiPap     FIO2 80 %      Ventilator Mode NIV     Set Tidal Volume 500     Hemodilution No     Respiratory Rate 24     Inspiratory Time 1     PO2/FIO2 205    Blood Gas, Arterial - [[id number]]  (Abnormal) Collected: 03/14/25 0038    Specimen: Arterial Blood Updated: 03/14/25 0250     Site Right Radial     Shay's Test Positive     pH, Arterial 7.245 pH units      pCO2, Arterial 71.8 mm Hg      pO2, Arterial 83.3 mm Hg      HCO3, Arterial 31.1 mmol/L      Base Excess, Arterial 1.9 mmol/L      Comment: Serial Number: [id number]:  [id number]        O2 Saturation, Arterial 93.5 %      CO2 Content 33.3 mmol/L      Barometric Pressure for Blood Gas --     Comment: N/A        Modality HFNC     FIO2 80 %      Notified Who --     Read Back --     Notified Time --     Hemodilution No     PO2/FIO2 104    High Sensitivity Troponin T 1Hr [[id number]]  (Normal) Collected: 03/13/25 2311    Specimen: Blood Updated: 03/13/25 2336     HS Troponin T 12 ng/L      Troponin T Numeric Delta 1 ng/L     Narrative:      High Sensitive Troponin T Reference Range:  <14.0 ng/L- Negative Female for AMI  <22.0 ng/L- Negative Male for AMI  >=14 - Abnormal Female indicating possible myocardial injury.  >=22 - Abnormal Male indicating possible myocardial injury.   Clinicians would have to utilize clinical acumen, EKG, Troponin, and serial changes to determine if it is an Acute Myocardial Infarction or myocardial injury due to an underlying chronic condition.         Blood Culture - Blood, Arm, Left [[id number]] Collected: 03/13/25 2137    Specimen: Blood from Arm, Left Updated: 03/13/25 2319    Respiratory  Panel PCR w/COVID-19(SARS-CoV-2) LOUIE/GIOVANNY/TOMMY/PAD/COR/JANET In-House, NP Swab in UTM/VTM, 2 HR TAT - Swab, Nasopharynx [[id number]]  (Abnormal) Collected: 03/13/25 2137    Specimen: Swab from Nasopharynx Updated: 03/13/25 2256     ADENOVIRUS, PCR Not Detected     Coronavirus 229E Not Detected     Coronavirus HKU1 Detected     Coronavirus NL63 Not Detected     Coronavirus OC43 Not Detected     COVID19 Not Detected     Human Metapneumovirus Not Detected     Human Rhinovirus/Enterovirus Not Detected     Influenza A PCR Not Detected     Influenza B PCR Not Detected     Parainfluenza Virus 1 Not Detected     Parainfluenza Virus 2 Not Detected     Parainfluenza Virus 3 Not Detected     Parainfluenza Virus 4 Not Detected     RSV, PCR Not Detected     Bordetella pertussis pcr Not Detected     Bordetella parapertussis PCR Not Detected     Chlamydophila pneumoniae PCR Not Detected     Mycoplasma pneumo by PCR Not Detected    Narrative:      In the setting of a positive respiratory panel with a viral infection PLUS a negative procalcitonin without other underlying concern for bacterial infection, consider observing off antibiotics or discontinuation of antibiotics and continue supportive care. If the respiratory panel is positive for atypical bacterial infection (Bordetella pertussis, Chlamydophila pneumoniae, or Mycoplasma pneumoniae), consider antibiotic de-escalation to target atypical bacterial infection.    Urine Drug Screen - Urine, Clean Catch [[id number]]  (Abnormal) Collected: 03/13/25 2220    Specimen: Urine, Clean Catch Updated: 03/13/25 2252     THC, Screen, Urine Negative     Phencyclidine (PCP), Urine Negative     Cocaine Screen, Urine Negative     Methamphetamine, Ur Positive     Opiate Screen Negative     Amphetamine Screen, Urine Positive     Benzodiazepine Screen, Urine Positive     Tricyclic Antidepressants Screen Negative     Methadone Screen, Urine Positive     Barbiturates Screen, Urine Negative     Oxycodone  Screen, Urine Negative     Buprenorphine, Screen, Urine Negative    Narrative:      Cutoff For Drugs Screened:    Amphetamines               500 ng/ml  Barbiturates               200 ng/ml  Benzodiazepines            150 ng/ml  Cocaine                    150 ng/ml  Methadone                  200 ng/ml  Opiates                    100 ng/ml  Phencyclidine               25 ng/ml  THC                         50 ng/ml  Methamphetamine            500 ng/ml  Tricyclic Antidepressants  300 ng/ml  Oxycodone                  100 ng/ml  Buprenorphine               10 ng/ml    The normal value for all drugs tested is negative. This report includes unconfirmed screening results, with the cutoff values listed, to be used for medical treatment purposes only.  Unconfirmed results must not be used for non-medical purposes such as employment or legal testing.  Clinical consideration should be applied to any drug of abuse test, particularly when unconfirmed results are used.    All urine drugs of abuse requests without chain of custody are for medical screening purposes only.  False positives are possible.      Urinalysis With Microscopic If Indicated (No Culture) - Urine, Clean Catch [[id number]]  (Abnormal) Collected: 03/13/25 2220    Specimen: Urine, Clean Catch Updated: 03/13/25 2238     Color, UA Yellow     Appearance, UA Clear     pH, UA 5.5     Specific Gravity, UA 1.021     Glucose, UA Negative     Ketones, UA Negative     Bilirubin, UA Negative     Blood, UA Negative     Protein, UA Trace     Leuk Esterase, UA Negative     Nitrite, UA Negative     Urobilinogen, UA 1.0 E.U./dL    Narrative:      Urine microscopic not indicated.    Comprehensive Metabolic Panel [[id number]]  (Abnormal) Collected: 03/13/25 2137    Specimen: Blood Updated: 03/13/25 2209     Glucose 112 mg/dL      BUN 5 mg/dL      Creatinine 0.64 mg/dL      Sodium 137 mmol/L      Potassium 4.8 mmol/L      Chloride 101 mmol/L      CO2 30.0 mmol/L      Calcium 8.8  mg/dL      Total Protein 6.6 g/dL      Albumin 3.7 g/dL      ALT (SGPT) 48 U/L      AST (SGOT) 39 U/L      Alkaline Phosphatase 113 U/L      Total Bilirubin 0.2 mg/dL      Globulin 2.9 gm/dL      A/G Ratio 1.3 g/dL      BUN/Creatinine Ratio 7.8     Anion Gap 6.0 mmol/L      eGFR 119.1 mL/min/1.73     Narrative:      GFR Categories in Chronic Kidney Disease (CKD)      GFR Category          GFR (mL/min/1.73)    Interpretation  G1                     90 or greater         Normal or high (1)  G2                      60-89                Mild decrease (1)  G3a                   45-59                Mild to moderate decrease  G3b                   30-44                Moderate to severe decrease  G4                    15-29                Severe decrease  G5                    14 or less           Kidney failure          (1)In the absence of evidence of kidney disease, neither GFR category G1 or G2 fulfill the criteria for CKD.    eGFR calculation 2021 CKD-EPI creatinine equation, which does not include race as a factor    BNP [[id number]]  (Abnormal) Collected: 03/13/25 2137    Specimen: Blood Updated: 03/13/25 2209     proBNP 557.0 pg/mL     Narrative:      This assay is used as an aid in the diagnosis of individuals suspected of having heart failure. It can be used as an aid in the diagnosis of acute decompensated heart failure (ADHF) in patients presenting with signs and symptoms of ADHF to the emergency department (ED). In addition, NT-proBNP of <300 pg/mL indicates ADHF is not likely.    Age Range Result Interpretation  NT-proBNP Concentration (pg/mL:      <50             Positive            >450                   Gray                 300-450                    Negative             <300    50-75           Positive            >900                  Gray                300-900                  Negative            <300      >75             Positive            >1800                  Gray                300-1800                   Negative            <300    High Sensitivity Troponin T [[id number]]  (Normal) Collected: 03/13/25 2137    Specimen: Blood Updated: 03/13/25 2209     HS Troponin T 11 ng/L     Narrative:      High Sensitive Troponin T Reference Range:  <14.0 ng/L- Negative Female for AMI  <22.0 ng/L- Negative Male for AMI  >=14 - Abnormal Female indicating possible myocardial injury.  >=22 - Abnormal Male indicating possible myocardial injury.   Clinicians would have to utilize clinical acumen, EKG, Troponin, and serial changes to determine if it is an Acute Myocardial Infarction or myocardial injury due to an underlying chronic condition.         Acetaminophen Level [[id number]]  (Normal) Collected: 03/13/25 2137    Specimen: Blood Updated: 03/13/25 2209     Acetaminophen <5.0 mcg/mL     Narrative:      Acetaminophen Therapeutic Range  5-20 ug/mL      Hours after ingestion            Toxic Value    4 Hours                           150 ug/mL    8 Hours                            70 ug/mL   12 Hours                            40 ug/mL   16 Hours                            20 ug/mL    These values apply to a single ingestion only.     Ethanol [[id number]] Collected: 03/13/25 2137    Specimen: Blood Updated: 03/13/25 2209     Ethanol % <0.010 %     Narrative:      Plasma Ethanol Clinical Symptoms:    ETOH (%)               Clinical Symptom  .01-.05              No apparent influence  .03-.12              Euphoria, Diminished judgment and attention   .09-.25              Impaired comprehension, Muscle incoordination  .18-.30              Confusion, Staggered gait, Slurred speech  .25-.40              Markedly decreased response to stimuli, unable to stand or                        walk, vomitting, sleep or stupor  .35-.50              Comatose, Anesthesia, Subnormal body temperature        Salicylate Level [[id number]]  (Normal) Collected: 03/13/25 2137    Specimen: Blood Updated: 03/13/25 2209     Salicylate 0.5 mg/dL     hCG,  "Quantitative, Pregnancy [[id number]] Collected: 03/13/25 2137    Specimen: Blood Updated: 03/13/25 2208     HCG Quantitative <1.00 mIU/mL     Narrative:      HCG Ranges by Gestational Age    Females - non-pregnant premenopausal   </= 1mIU/mL HCG  Females - postmenopausal               </= 7mIU/mL HCG    3 Weeks       5.4   -      72 mIU/mL  4 Weeks      10.2   -     708 mIU/mL  5 Weeks       217   -   8,245 mIU/mL  6 Weeks       152   -  32,177 mIU/mL  7 Weeks     4,059   - 153,767 mIU/mL  8 Weeks    31,366   - 149,094 mIU/mL  9 Weeks    59,109   - 135,901 mIU/mL  10 Weeks   44,186   - 170,409 mIU/mL  12 Weeks   27,107   - 201,615 mIU/mL  14 Weeks   24,302   -  93,646 mIU/mL  15 Weeks   12,540   -  69,747 mIU/mL  16 Weeks    8,904   -  55,332 mIU/mL  17 Weeks    8,240   -  51,793 mIU/mL  18 Weeks    9,649   -  55,271 mIU/mL      D-dimer, Quantitative [[id number]]  (Abnormal) Collected: 03/13/25 2137    Specimen: Blood Updated: 03/13/25 2157     D-Dimer, Quantitative 0.86 MCGFEU/mL     Narrative:      According to the assay 's published package insert, a normal (<0.50 MCGFEU/mL) D-dimer result in conjunction with a non-high clinical probability assessment, excludes deep vein thrombosis (DVT) and pulmonary embolism (PE) with high sensitivity.    D-dimer values increase with age and this can make VTE exclusion of an older population difficult. To address this, the [employer], based on best available evidence and recent guidelines, recommends that clinicians use age-adjusted D-dimer thresholds in patients greater than 50 years of age with: a) a low probability of PE who do not meet all Pulmonary Embolism Rule Out Criteria, or b) in those with intermediate probability of PE.   The formula for an age-adjusted D-dimer cut-off is \"age/100\".  For example, a 60 year old patient would have an age-adjusted cut-off of 0.60 MCGFEU/mL and an 80 year old 0.80 MCGFEU/mL.    CBC & Differential " [[id number]]  (Abnormal) Collected: 03/13/25 2137    Specimen: Blood Updated: 03/13/25 2147    Narrative:      The following orders were created for panel order CBC & Differential.  Procedure                               Abnormality         Status                     ---------                               -----------         ------                     CBC Auto Differential[[id number]]        Abnormal            Final result                 Please view results for these tests on the individual orders.    CBC Auto Differential [[id number]]  (Abnormal) Collected: 03/13/25 2137    Specimen: Blood Updated: 03/13/25 2147     WBC 6.06 10*3/mm3      RBC 4.35 10*6/mm3      Hemoglobin 11.6 g/dL      Hematocrit 39.1 %      MCV 89.9 fL      MCH 26.7 pg      MCHC 29.7 g/dL      RDW 14.6 %      RDW-SD 48.1 fl      MPV 9.4 fL      Platelets 231 10*3/mm3      Neutrophil % 80.4 %      Lymphocyte % 9.1 %      Monocyte % 9.1 %      Eosinophil % 0.2 %      Basophil % 0.2 %      Immature Grans % 1.0 %      Neutrophils, Absolute 4.88 10*3/mm3      Lymphocytes, Absolute 0.55 10*3/mm3      Monocytes, Absolute 0.55 10*3/mm3      Eosinophils, Absolute 0.01 10*3/mm3      Basophils, Absolute 0.01 10*3/mm3      Immature Grans, Absolute 0.06 10*3/mm3      nRBC 0.0 /100 WBC     [facility] Draw [[id number]] Collected: 03/13/25 2137    Specimen: Blood Updated: 03/13/25 2146    Narrative:      The following orders were created for panel order [facility] Draw.  Procedure                               Abnormality         Status                     ---------                               -----------         ------                     Green Top (Gel)[[id number]]                                  Final result               Lavender Top[[id number]]                                     Final result               Gold Top - SST[[id number]]                                   Final result               Light Blue Top[[id number]]                                   Final  "result                 Please view results for these tests on the individual orders.    Green Top (Gel) [[id number]] Collected: 03/13/25 2137    Specimen: Blood Updated: 03/13/25 2146     Extra Tube Hold for add-ons.     Comment: Auto resulted.       Lavender Top [[id number]] Collected: 03/13/25 2137    Specimen: Blood Updated: 03/13/25 2146     Extra Tube hold for add-on     Comment: Auto resulted       Gold Top - SST [[id number]] Collected: 03/13/25 2137    Specimen: Blood Updated: 03/13/25 2146     Extra Tube Hold for add-ons.     Comment: Auto resulted.       Light Blue Top [[id number]] Collected: 03/13/25 2137    Specimen: Blood Updated: 03/13/25 2146     Extra Tube Hold for add-ons.     Comment: Auto resulted       Blood Culture - Blood, Arm, Right [[id number]] Collected: 03/13/25 2143    Specimen: Blood from Arm, Right Updated: 03/13/25 2143    POC Lactate [[id number]]  (Normal) Collected: 03/13/25 2141    Specimen: Blood Updated: 03/13/25 2143     Lactate 1.0 mmol/L      Comment: Serial Number: [id number]:  [id number]               CT Angiogram Chest Pulmonary Embolism  Result Date: 3/14/2025  Impression: No evidence of pulmonary embolism. Bilateral basilar atelectasis. Electronically Signed: Sonny Pastrana MD  3/14/2025 12:02 AM EDT  Workstation ID: [id number]    CT Head Without Contrast  Result Date: 3/13/2025  Impression: Slightly more asymmetric hypoattenuation in the right cerebellum, which may posterior fossa/streak artifacts, though if patient's dizziness persists and there is concern for ischemia, recommend brain MRI. Otherwise, no acute cranial findings. Electronically Signed: Dariel Rogers  3/13/2025 10:52 PM EDT  Workstation ID: [id number]      CONSULTS   Consults       No orders found for last 30 day(s).            CONDITION ON DISCHARGE    Stable    VITAL SIGNS  /77   Pulse 83   Temp 97.4 °F (36.3 °C) (Axillary)   Resp 21   Ht 170.2 cm (67.01\")   Wt (!) 156 kg (343 lb 14.7 oz)   LMP " 02/15/2025 (Approximate)   SpO2 94%   BMI 53.85 kg/m²     PHYSICAL EXAM  Vitals and nursing note reviewed.   Constitutional:       General: She is not in acute distress.     Appearance: She is not ill-appearing.   HENT:      Head: Normocephalic.      Mouth/Throat:      Mouth: Mucous membranes are moist.      Pharynx: Oropharynx is clear.   Eyes:      Extraocular Movements: Extraocular movements intact.      Conjunctiva/sclera: Conjunctivae normal.      Pupils: Pupils are equal, round, and reactive to light.   Cardiovascular:      Rate and Rhythm: Normal rate and regular rhythm.      Pulses: Normal pulses.      Heart sounds: Normal heart sounds.   Pulmonary:      Effort: Tachypnea present.      Breath sounds: Examination of the right-middle field reveals decreased breath sounds. Examination of the left-middle field reveals decreased breath sounds. Examination of the right-lower field reveals decreased breath sounds. Examination of the left-lower field reveals decreased breath sounds. Decreased breath sounds present.   Abdominal:      General: Bowel sounds are normal.      Palpations: Abdomen is soft.   Musculoskeletal:      Right lower leg: No edema.      Left lower leg: No edema.   Skin:     General: Skin is warm and dry.      Findings: No rash.      Comments: Bruising on left upper thigh and knee due to failed injections of meth   Neurological:      General: No focal deficit present.      Mental Status: She is alert and oriented to person, place, and time.   Psychiatric:         Mood and Affect: Mood is anxious.         Behavior: Behavior is uncooperative and agitated.         Thought Content: Thought content does not include suicidal ideation. Thought content does not include suicidal plan.        DISCHARGE DISPOSITION  Left Against Medical Advice    DISCHARGE MEDICATIONS     Discharge Medications        Continue These Medications        Instructions Start Date   albuterol sulfate  (90 Base) MCG/ACT  inhaler  Commonly known as: PROVENTIL HFA;VENTOLIN HFA;PROAIR HFA   2 puffs, Inhalation, Every 4 Hours PRN      Brexpiprazole 2 MG tablet   2 mg, Daily      FLUoxetine 40 MG capsule  Commonly known as: PROzac   40 mg, Daily      gabapentin 100 MG capsule  Commonly known as: NEURONTIN   100 mg, 3 Times Daily      ipratropium-albuterol 0.5-2.5 mg/3 ml nebulizer  Commonly known as: DUO-NEB   3 mL, Nebulization, 4 Times Daily - RT      lamoTRIgine 25 MG tablet  Commonly known as: LaMICtal   25 mg, Oral, 2 Times Daily      methadone 5 MG tablet  Commonly known as: DOLOPHINE   177 mg, Daily      methocarbamol 750 MG tablet  Commonly known as: ROBAXIN   750 mg, 3 Times Daily      montelukast 10 MG tablet  Commonly known as: SINGULAIR   10 mg, Oral, Nightly      naloxone 4 MG/0.1ML nasal spray  Commonly known as: NARCAN   Call 911. Don't prime. [place] in 1 nostril for overdose. Repeat in 2-3 minutes in other nostril if no or minimal breathing/responsiveness.      promethazine 25 MG tablet  Commonly known as: PHENERGAN   25 mg, Oral, Every 6 Hours PRN      vitamin D 1.25 MG (41337 UT) capsule capsule  Commonly known as: ERGOCALCIFEROL   50,000 Units, Weekly             ASK your doctor about these medications        Instructions Start Date   hydrOXYzine 25 MG tablet  Commonly known as: ATARAX  Ask about: Which instructions should I use?   25 mg, 3 Times Daily PRN                 DISCHARGE DIET   As tolerated    ACTIVITY AT DISCHARGE   As tolerated      FOLLOW-UP APPOINTMENTS  No future appointments.        TEST RESULTS PENDING AT DISCHARGE  Pending Results       Procedure [Order ID] Specimen - Date/Time    Blood Culture - Blood, Arm, Left [[id number]] Collected: 03/13/25 2137    Specimen: Blood from Arm, Left Updated: 03/13/25 2319    Blood Culture - Blood, Arm, Right [[id number]] Collected: 03/13/25 2143    Specimen: Blood from Arm, Right Updated: 03/13/25 2143    Lipid Panel [[id number]]     Specimen: Blood     MRSA Screen, PCR  (Inpatient) - Swab, Nares [[id number]]     Specimen: Swab from Nares               This note scribed by me for Dr. Martinez.     Samantha Dupree, APRN  3/14/2025

## 2025-03-14 NOTE — PLAN OF CARE
Goal Outcome Evaluation:           Progress: no change  Outcome Evaluation: Pt admitted to ICU with respiratory failure with hypoxia. Pt arrived to unit on bipap at 80%. Pt requesting to be able to eat & drink. Educated pt on the benefits of remaining NPO while on bipap. Pt resistant to education. Educated pt on the importance of bipap compliance. Educated pt that her O2 sat was low 60's when she arrived and that O2 should be at least 90%. Pt resistant to education. Scattered bruising in various stages of healing observed to BLE. S.O. remains at bedside. Pt transitioning to Airvo per APRN. Call light within reach of pt. Plan of care remains ongoing.

## 2025-03-14 NOTE — CASE MANAGEMENT/SOCIAL WORK
Case Management Discharge Note      Final Note: Left AMA         Selected Continued Care - Discharged on 3/14/2025 Admission date: 3/13/2025 - Discharge disposition: Left Against Medical Advice       Transportation Services  Private: Car    Final Discharge Disposition Code: 07 - left RENÉE Luong RN  ICU/CVU   O: [phone]  C: [phone]  [email or web address]

## 2025-03-16 ENCOUNTER — HOSPITAL ENCOUNTER (OUTPATIENT)
Facility: HOSPITAL | Age: 35
Discharge: LEFT BEFORE DISCHARGE | End: 2025-03-16
Attending: EMERGENCY MEDICINE | Admitting: EMERGENCY MEDICINE
Payer: MEDICAID

## 2025-03-16 VITALS
WEIGHT: 293 LBS | DIASTOLIC BLOOD PRESSURE: 71 MMHG | RESPIRATION RATE: 18 BRPM | SYSTOLIC BLOOD PRESSURE: 131 MMHG | OXYGEN SATURATION: 97 % | HEART RATE: 76 BPM | HEIGHT: 67 IN | BODY MASS INDEX: 45.99 KG/M2 | TEMPERATURE: 97.7 F

## 2025-03-16 DIAGNOSIS — F41.0 PANIC ATTACK: Primary | ICD-10-CM

## 2025-03-16 PROCEDURE — G0463 HOSPITAL OUTPT CLINIC VISIT: HCPCS | Performed by: NURSE PRACTITIONER

## 2025-03-16 PROCEDURE — 63710000001 DIPHENHYDRAMINE PER 50 MG: Performed by: NURSE PRACTITIONER

## 2025-03-16 RX ORDER — DIPHENHYDRAMINE HCL 25 MG
50 CAPSULE ORAL ONCE
Status: COMPLETED | OUTPATIENT
Start: 2025-03-16 | End: 2025-03-16

## 2025-03-16 RX ADMIN — DIPHENHYDRAMINE HYDROCHLORIDE 50 MG: 25 CAPSULE ORAL at 17:40

## 2025-03-16 NOTE — FSED PROVIDER NOTE
"Subjective   History of Present Illness  34-year-old female presents with complaints of \" panic attack\" since this morning when she was being discharged from [facility].  Patient states she was admitted in [facility] for 3 days for respiratory failure related to COVID-19.  Patient reports a known history of panic attacks.  Patient does not know of any specific trigger causing this feeling.  Patient denies feelings of suicidal or homicidal ideation.    History provided by:  Patient      Review of Systems   Constitutional:  Negative for chills, fatigue and fever.   HENT:  Negative for congestion, ear pain, rhinorrhea, sinus pressure, sinus pain and sore throat.    Respiratory:  Negative for cough.    Cardiovascular:  Negative for chest pain.   Gastrointestinal:  Negative for abdominal distention, diarrhea, nausea and vomiting.   Genitourinary:  Negative for dysuria.   Skin:  Negative for rash.   Psychiatric/Behavioral:  Positive for agitation and behavioral problems. Negative for confusion, hallucinations, self-injury, sleep disturbance and suicidal ideas. The patient is nervous/anxious.        Past Medical History:   Diagnosis Date    Asthma     Kidney stone        Allergies   Allergen Reactions    Penicillins Hives     Beta lactam allergy details  Antibiotic reaction: other (throat closed)  Age at reaction: infant  Dose to reaction time: (!) hours  Reason for antibiotic: unknown  Epinephrine required for reaction?: unknown  Tolerated antibiotics: amoxicillin, cephalexin, cefepime           Past Surgical History:   Procedure Laterality Date     SECTION      CHOLECYSTECTOMY      DILATION AND CURETTAGE, DIAGNOSTIC / THERAPEUTIC      EXCISION LESION N/A 2024    Procedure: EXCISION of cyst from psterior neck, prone position;  Surgeon: Raul Lawson MD;  Location: [facility] MAIN OR;  Service: General;  Laterality: N/A;    URETERAL STENT INSERTION         No family history on " file.    Social History     Socioeconomic History    Marital status: Single   Tobacco Use    Smoking status: Every Day     Current packs/day: 1.00     Average packs/day: 1 pack/day for 12.2 years (12.2 ttl pk-yrs)     Types: Cigarettes     Start date: 2013     Passive exposure: Current    Smokeless tobacco: Never   Vaping Use    Vaping status: Never Used   Substance and Sexual Activity    Alcohol use: No    Drug use: Never    Sexual activity: Defer           Objective   Physical Exam  Vitals and nursing note reviewed.   Constitutional:       General: She is in acute distress.      Appearance: Normal appearance.   HENT:      Right Ear: Tympanic membrane normal.      Left Ear: Tympanic membrane normal.      Nose: Nose normal.      Mouth/Throat:      Mouth: Mucous membranes are moist.   Eyes:      Pupils: Pupils are equal, round, and reactive to light.   Cardiovascular:      Rate and Rhythm: Normal rate.      Pulses: Normal pulses.      Heart sounds: Normal heart sounds.   Pulmonary:      Effort: Pulmonary effort is normal.      Breath sounds: Normal breath sounds.   Musculoskeletal:         General: Normal range of motion.      Cervical back: Normal range of motion.   Skin:     General: Skin is warm and dry.   Neurological:      Mental Status: She is alert and oriented to person, place, and time.      Cranial Nerves: No cranial nerve deficit.      Sensory: No sensory deficit.      Motor: No weakness.   Psychiatric:         Attention and Perception: She does not perceive visual hallucinations.         Mood and Affect: Mood is anxious. Mood is not depressed. Affect is not blunt, angry or inappropriate.         Speech: Speech is rapid and pressured.         Behavior: Behavior is agitated.         Thought Content: Thought content is not paranoid or delusional. Thought content does not include homicidal or suicidal ideation. Thought content does not include homicidal or suicidal plan.         Procedures           ED  "Course                                           Medical Decision Making  34-year-old patient presents to ER with complaints of \" panic attack\" after being discharged from [facility] this morning.  Gave patient 50 mg of Benadryl in ER and went back to monitor patient and she is nowhere to be found.  Patient denied suicidal or homicidal ideation at time of exam.  Patient appeared frustrated when informed we were not going to administer drugs such as Xanax or Versed today.  Patient appeared disinterested when we informed her we could get in contact with Nancy about restarting some of her anxiety meds.  Patient eloped prior to discharge.    Problems Addressed:  Panic attack: acute illness or injury        Final diagnoses:   Panic attack       ED Disposition  ED Disposition       ED Disposition   Eloped    Condition   --    Comment   Went to check on patient after benadryl administration and patient and friend are not in room. Waited 15 minutes, looked in restrooms and cannot locate patient.                No follow-up provider specified.       Medication List      No changes were made to your prescriptions during this visit.         "

## 2025-03-18 LAB
BACTERIA SPEC AEROBE CULT: NORMAL
BACTERIA SPEC AEROBE CULT: NORMAL

## 2025-05-24 ENCOUNTER — HOSPITAL ENCOUNTER (OUTPATIENT)
Facility: HOSPITAL | Age: 35
Discharge: HOME | End: 2025-05-24
Attending: EMERGENCY MEDICINE | Admitting: EMERGENCY MEDICINE
Payer: MEDICAID

## 2025-05-24 ENCOUNTER — APPOINTMENT (OUTPATIENT)
Dept: GENERAL RADIOLOGY | Facility: HOSPITAL | Age: 35
End: 2025-05-24
Payer: MEDICAID

## 2025-05-24 VITALS
HEIGHT: 67 IN | WEIGHT: 293 LBS | BODY MASS INDEX: 45.99 KG/M2 | HEART RATE: 89 BPM | RESPIRATION RATE: 18 BRPM | DIASTOLIC BLOOD PRESSURE: 74 MMHG | OXYGEN SATURATION: 94 % | SYSTOLIC BLOOD PRESSURE: 116 MMHG | TEMPERATURE: 98.9 F

## 2025-05-24 DIAGNOSIS — J45.21 MILD INTERMITTENT ASTHMA WITH EXACERBATION: Primary | ICD-10-CM

## 2025-05-24 DIAGNOSIS — F17.200 SMOKER: ICD-10-CM

## 2025-05-24 PROCEDURE — G0463 HOSPITAL OUTPT CLINIC VISIT: HCPCS

## 2025-05-24 PROCEDURE — 71046 X-RAY EXAM CHEST 2 VIEWS: CPT

## 2025-05-24 PROCEDURE — 99214 OFFICE O/P EST MOD 30 MIN: CPT

## 2025-05-24 RX ORDER — BENZONATATE 100 MG/1
100 CAPSULE ORAL 3 TIMES DAILY PRN
Qty: 12 CAPSULE | Refills: 0 | Status: SHIPPED | OUTPATIENT
Start: 2025-05-24

## 2025-05-24 RX ORDER — PREDNISONE 20 MG/1
60 TABLET ORAL ONCE
Status: DISCONTINUED | OUTPATIENT
Start: 2025-05-24 | End: 2025-05-24

## 2025-05-24 RX ORDER — IPRATROPIUM BROMIDE AND ALBUTEROL SULFATE 2.5; .5 MG/3ML; MG/3ML
3 SOLUTION RESPIRATORY (INHALATION) ONCE
Status: COMPLETED | OUTPATIENT
Start: 2025-05-24 | End: 2025-05-24

## 2025-05-24 RX ADMIN — IPRATROPIUM BROMIDE AND ALBUTEROL SULFATE 3 ML: .5; 3 SOLUTION RESPIRATORY (INHALATION) at 19:20

## 2025-05-24 NOTE — DISCHARGE INSTRUCTIONS
Use the inhaler as prescribed.  Please follow-up with your primary care provider and your pulmonologist.  It is very important that you stop smoking.  Return to the emergency room for any new or worsening symptoms

## 2025-05-25 NOTE — FSED PROVIDER NOTE
[facility]    EMERGENCY [facility]NCOUNTER    Room Number:    Date seen:  2025  Time seen: 20:42 EDT  PCP: Ellie Elizabeth APRN  Historian: patient    HPI:  Chief complaint:cough  Context:Jacqueline Turcios is a 34 y.o. female who presents to the ED with c/o cough.  Patient reports that she has been having cough with congestion for the last several weeks.  She reports that she was in the hospital last month for low oxygen and bronchitis.  Patient denies any known fever.  Reports she has been having bodyaches and a cough.  Patient denies any chest pain, shortness of breath.  She reports that she currently sees Dr. Jeong for pulmonary.  She reports that she is an every day smoker.  Patient reports that she is smoking approximately 1 pack cigarettes per day.  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    Cellulitis 10/23/2017    Depression 2025    Elevated liver enzymes 2024    Fatigue 2024    Generalized anxiety disorder 2024    H/O  section complicating pregnancy 2022    H/O chronic hepatitis 2022    Hepatitis C 2025    Hyperglycemia 2024    Hyperlipidemia 2024    Hyponatremia 2024    Lumbar radiculopathy 2025    Methadone maintenance treatment affecting pregnancy, antepartum 2022    Obesity 2024    Opioid withdrawal 2017    Panic attack 2024    Postpartum care following  delivery 2023    Severe recurrent major depression without psychotic features 2024    STD (female) 2018    Thoracic back  pain 2025    Vitamin D deficiency 2024     Resolved Ambulatory Problems     Diagnosis Date Noted    Sebaceous cyst 2024     Past Medical History:   Diagnosis Date    Asthma     Kidney stone        PAST SURGICAL HISTORY  Past Surgical History:   Procedure Laterality Date     SECTION      CHOLECYSTECTOMY      DILATION AND CURETTAGE, DIAGNOSTIC / THERAPEUTIC      EXCISION LESION N/A 2024    Procedure: EXCISION of cyst from psterior neck, prone position;  Surgeon: Raul Lawson MD;  Location: [facility] MAIN OR;  Service: General;  Laterality: N/A;    URETERAL STENT INSERTION         FAMILY HISTORY  History reviewed. No pertinent family history.    SOCIAL HISTORY  Social History     Socioeconomic History    Marital status: Single   Tobacco Use    Smoking status: Every Day     Current packs/day: 1.00     Average packs/day: 1 pack/day for 12.4 years (12.4 ttl pk-yrs)     Types: Cigarettes     Start date:      Passive exposure: Current    Smokeless tobacco: Never   Vaping Use    Vaping status: Never Used   Substance and Sexual Activity    Alcohol use: No    Drug use: Never    Sexual activity: Defer       REVIEW OF SYSTEMS  Review of Systems    All systems reviewed and negative except for those discussed in HPI.     PHYSICAL EXAM    I have reviewed the triage vital signs and nursing notes.    ED Triage Vitals [25 1808]   Temp Heart Rate Resp BP SpO2   98.9 °F (37.2 °C) 89 18 116/74 94 %      Temp src Heart Rate Source Patient Position BP Location FiO2 (%)   Oral Monitor Sitting Right arm --       Physical Exam  Constitutional:       Appearance: Normal appearance. She is not toxic-appearing.   HENT:      Nose: Nose normal.      Mouth/Throat:      Mouth: Mucous membranes are moist.   Eyes:      Pupils: Pupils are equal, round, and reactive to light.   Cardiovascular:      Rate and Rhythm: Normal rate and regular rhythm.      Pulses: Normal pulses.      Heart sounds: Normal heart  sounds.   Pulmonary:      Effort: Pulmonary effort is normal.      Breath sounds: Wheezing present.   Musculoskeletal:         General: Normal range of motion.   Skin:     General: Skin is warm.   Neurological:      General: No focal deficit present.      Mental Status: She is alert.   Psychiatric:         Mood and Affect: Mood normal.         Behavior: Behavior normal.         Vital signs and nursing notes reviewed.        LAB RESULTS  No results found for this or any previous visit (from the past 24 hours).    Ordered the above labs and independently reviewed the results.      RADIOLOGY RESULTS  XR Chest PA & Lateral  Result Date: 5/24/2025  XR CHEST PA AND LATERAL Date of Exam: 5/24/2025 6:57 PM EDT Indication: cough and congestion x few weeks Comparison: None available. Findings: There are no airspace consolidations. No pleural fluid. No pneumothorax. The pulmonary vasculature appears within normal limits. The cardiac and mediastinal silhouette appear unremarkable. No acute osseous abnormality identified.     Impression: No definite acute pulmonary process Electronically Signed: Osmani Bran MD  5/24/2025 7:21 PM EDT  Workstation ID: [id number]         I ordered the above noted radiological studies. Independently reviewed by me and discussed with radiologist.  See dictation above for official radiology interpretation.      Orders placed during this visit:  Orders Placed This Encounter   Procedures    XR Chest PA & Lateral           PROCEDURES    Procedures        MEDICATIONS GIVEN IN ER    Medications   ipratropium-albuterol (DUO-NEB) nebulizer solution 3 mL (3 mL Nebulization Given 5/24/25 1920)         PROGRESS, DATA ANALYSIS, CONSULTS, AND MEDICAL DECISION MAKING    All labs and radiology studies have been independently reviewed by me.          AS OF 20:44 EDT VITALS:    BP - 116/74  HR - 89  TEMP - 98.9 °F (37.2 °C) (Oral)  02 SATS - 94%    Medical Decision Making  Patient is a 34-year-old female presents  today with cough, wheezing. Presentation less concerning for pneumonia, heart failure, foreign body airway obstruction, pulmonary embolism, tamponade, atypical ACS.  Patient be sent home with a prescription for air supra inhaler.  I also sent her prescription for Tessalon Perles.  Recommend follow-up with her primary care provider.  She was given return precautions with understanding.    Problems Addressed:  Mild intermittent asthma with exacerbation: complicated acute illness or injury  Smoker: complicated acute illness or injury    Amount and/or Complexity of Data Reviewed  Radiology: ordered.    Risk  Prescription drug management.          DIAGNOSIS  Final diagnoses:   Mild intermittent asthma with exacerbation   Smoker       New Medications Ordered This Visit   Medications    ipratropium-albuterol (DUO-NEB) nebulizer solution 3 mL    Albuterol-Budesonide 90-80 MCG/ACT aerosol     Sig: Inhale 2 Inhalations Daily.     Dispense:  10.7 g     Refill:  0    benzonatate (TESSALON) 100 MG capsule     Sig: Take 1 capsule by mouth 3 (Three) Times a Day As Needed for Cough.     Dispense:  12 capsule     Refill:  0           I performed hand hygiene on entry into the pt room and upon exit.      Part of this note may be an electronic transcription/translation of spoken language to printed text using the Dragon Dictation System.     Appropriate PPE worn during exam.    Dictated utilizing Dragon dictation     Note Disclaimer: At [facility], we believe that sharing information builds trust and better relationships. You are receiving this note because you recently visited [facility]. It is possible you will see health information before a provider has talked with you about it. This kind of information can be easy to misunderstand. To help you fully understand what it means for your health, we urge you to discuss this note with your provider.

## 2025-08-21 ENCOUNTER — HOSPITAL ENCOUNTER (OUTPATIENT)
Facility: HOSPITAL | Age: 35
Discharge: HOME | End: 2025-08-21
Attending: EMERGENCY MEDICINE | Admitting: EMERGENCY MEDICINE
Payer: MEDICAID

## (undated) DEVICE — CUFF SCD HEMOFORCE SEQ CALF STD MD

## (undated) DEVICE — ANTIBACTERIAL VIOLET BRAIDED (POLYGLACTIN 910), SYNTHETIC ABSORBABLE SUTURE: Brand: COATED VICRYL

## (undated) DEVICE — KT SURG TURNOVER 050

## (undated) DEVICE — PK MINOR LAPAROTOMY 50

## (undated) DEVICE — TBG PENCL TELESCP MEGADYNE SMOKE EVAC 15FT

## (undated) DEVICE — UNDERGLV SURG BIOGEL INDICAT PF 8 GRN

## (undated) DEVICE — SUT ETHLN 3/0 FS1 30IN 669H

## (undated) DEVICE — GLV SURG BIOGEL LTX PF 7 1/2

## (undated) DEVICE — KT ESWAB FLX MINI/TP CULT AER/ANAEROB FASTIDIOUS BACT

## (undated) DEVICE — SLV SCD CALF HEMOFORCE DVT THERP REPROC MD

## (undated) DEVICE — SOL IRR NACL 0.9PCT 1000ML